# Patient Record
Sex: MALE | Race: WHITE | NOT HISPANIC OR LATINO | Employment: OTHER | URBAN - METROPOLITAN AREA
[De-identification: names, ages, dates, MRNs, and addresses within clinical notes are randomized per-mention and may not be internally consistent; named-entity substitution may affect disease eponyms.]

---

## 2018-02-04 ENCOUNTER — APPOINTMENT (EMERGENCY)
Dept: RADIOLOGY | Facility: HOSPITAL | Age: 68
DRG: 481 | End: 2018-02-04
Payer: MEDICARE

## 2018-02-04 ENCOUNTER — HOSPITAL ENCOUNTER (INPATIENT)
Facility: HOSPITAL | Age: 68
LOS: 3 days | Discharge: NURSING, REHAB OR LONG-TERM CARE | DRG: 481 | End: 2018-02-07
Attending: EMERGENCY MEDICINE | Admitting: ORTHOPAEDIC SURGERY
Payer: MEDICARE

## 2018-02-04 DIAGNOSIS — S72.002A CLOSED FRACTURE OF LEFT HIP, INITIAL ENCOUNTER (HCC): Primary | ICD-10-CM

## 2018-02-04 DIAGNOSIS — S72.142A INTERTROCHANTERIC FRACTURE OF LEFT FEMUR, CLOSED, INITIAL ENCOUNTER (HCC): ICD-10-CM

## 2018-02-04 PROBLEM — I10 ESSENTIAL HYPERTENSION: Chronic | Status: ACTIVE | Noted: 2018-02-04

## 2018-02-04 LAB
ANION GAP SERPL CALCULATED.3IONS-SCNC: 9 MMOL/L (ref 4–13)
APTT PPP: 24 SECONDS (ref 23–35)
BASOPHILS # BLD AUTO: 0.06 THOUSANDS/ΜL (ref 0–0.1)
BASOPHILS NFR BLD AUTO: 0 % (ref 0–1)
BUN SERPL-MCNC: 25 MG/DL (ref 5–25)
CALCIUM SERPL-MCNC: 9 MG/DL (ref 8.3–10.1)
CHLORIDE SERPL-SCNC: 102 MMOL/L (ref 100–108)
CO2 SERPL-SCNC: 27 MMOL/L (ref 21–32)
CREAT SERPL-MCNC: 1.14 MG/DL (ref 0.6–1.3)
EOSINOPHIL # BLD AUTO: 0.24 THOUSAND/ΜL (ref 0–0.61)
EOSINOPHIL NFR BLD AUTO: 2 % (ref 0–6)
ERYTHROCYTE [DISTWIDTH] IN BLOOD BY AUTOMATED COUNT: 13.9 % (ref 11.6–15.1)
GFR SERPL CREATININE-BSD FRML MDRD: 65 ML/MIN/1.73SQ M
GLUCOSE SERPL-MCNC: 97 MG/DL (ref 65–140)
HCT VFR BLD AUTO: 39.4 % (ref 36.5–49.3)
HGB BLD-MCNC: 12.9 G/DL (ref 12–17)
INR PPP: 0.93 (ref 0.86–1.16)
LYMPHOCYTES # BLD AUTO: 1.15 THOUSANDS/ΜL (ref 0.6–4.47)
LYMPHOCYTES NFR BLD AUTO: 8 % (ref 14–44)
MCH RBC QN AUTO: 28.7 PG (ref 26.8–34.3)
MCHC RBC AUTO-ENTMCNC: 32.7 G/DL (ref 31.4–37.4)
MCV RBC AUTO: 88 FL (ref 82–98)
MONOCYTES # BLD AUTO: 1.1 THOUSAND/ΜL (ref 0.17–1.22)
MONOCYTES NFR BLD AUTO: 7 % (ref 4–12)
NEUTROPHILS # BLD AUTO: 12.73 THOUSANDS/ΜL (ref 1.85–7.62)
NEUTS SEG NFR BLD AUTO: 83 % (ref 43–75)
PLATELET # BLD AUTO: 269 THOUSANDS/UL (ref 149–390)
PMV BLD AUTO: 9.4 FL (ref 8.9–12.7)
POTASSIUM SERPL-SCNC: 4.1 MMOL/L (ref 3.5–5.3)
PROTHROMBIN TIME: 12.7 SECONDS (ref 12.1–14.4)
RBC # BLD AUTO: 4.49 MILLION/UL (ref 3.88–5.62)
SODIUM SERPL-SCNC: 138 MMOL/L (ref 136–145)
WBC # BLD AUTO: 15.28 THOUSAND/UL (ref 4.31–10.16)

## 2018-02-04 PROCEDURE — 99221 1ST HOSP IP/OBS SF/LOW 40: CPT | Performed by: INTERNAL MEDICINE

## 2018-02-04 PROCEDURE — 36415 COLL VENOUS BLD VENIPUNCTURE: CPT | Performed by: PHYSICIAN ASSISTANT

## 2018-02-04 PROCEDURE — 85610 PROTHROMBIN TIME: CPT | Performed by: PHYSICIAN ASSISTANT

## 2018-02-04 PROCEDURE — 85025 COMPLETE CBC W/AUTO DIFF WBC: CPT | Performed by: PHYSICIAN ASSISTANT

## 2018-02-04 PROCEDURE — 96374 THER/PROPH/DIAG INJ IV PUSH: CPT

## 2018-02-04 PROCEDURE — 80048 BASIC METABOLIC PNL TOTAL CA: CPT | Performed by: PHYSICIAN ASSISTANT

## 2018-02-04 PROCEDURE — 71045 X-RAY EXAM CHEST 1 VIEW: CPT

## 2018-02-04 PROCEDURE — 99285 EMERGENCY DEPT VISIT HI MDM: CPT

## 2018-02-04 PROCEDURE — 85730 THROMBOPLASTIN TIME PARTIAL: CPT | Performed by: PHYSICIAN ASSISTANT

## 2018-02-04 PROCEDURE — 73502 X-RAY EXAM HIP UNI 2-3 VIEWS: CPT

## 2018-02-04 PROCEDURE — 93005 ELECTROCARDIOGRAM TRACING: CPT

## 2018-02-04 RX ORDER — ONDANSETRON 2 MG/ML
4 INJECTION INTRAMUSCULAR; INTRAVENOUS EVERY 6 HOURS PRN
Status: DISCONTINUED | OUTPATIENT
Start: 2018-02-04 | End: 2018-02-05 | Stop reason: HOSPADM

## 2018-02-04 RX ORDER — SODIUM CHLORIDE, SODIUM LACTATE, POTASSIUM CHLORIDE, CALCIUM CHLORIDE 600; 310; 30; 20 MG/100ML; MG/100ML; MG/100ML; MG/100ML
75 INJECTION, SOLUTION INTRAVENOUS CONTINUOUS
Status: DISCONTINUED | OUTPATIENT
Start: 2018-02-05 | End: 2018-02-05 | Stop reason: HOSPADM

## 2018-02-04 RX ORDER — OXYCODONE HYDROCHLORIDE AND ACETAMINOPHEN 5; 325 MG/1; MG/1
2 TABLET ORAL EVERY 4 HOURS PRN
Status: DISCONTINUED | OUTPATIENT
Start: 2018-02-04 | End: 2018-02-05 | Stop reason: HOSPADM

## 2018-02-04 RX ORDER — MORPHINE SULFATE 2 MG/ML
2 INJECTION, SOLUTION INTRAMUSCULAR; INTRAVENOUS EVERY 2 HOUR PRN
Status: DISCONTINUED | OUTPATIENT
Start: 2018-02-04 | End: 2018-02-05 | Stop reason: HOSPADM

## 2018-02-04 RX ORDER — MORPHINE SULFATE 4 MG/ML
3 INJECTION, SOLUTION INTRAMUSCULAR; INTRAVENOUS EVERY 4 HOURS PRN
Status: DISCONTINUED | OUTPATIENT
Start: 2018-02-04 | End: 2018-02-04

## 2018-02-04 RX ORDER — AMLODIPINE BESYLATE 5 MG/1
5 TABLET ORAL DAILY
Status: DISCONTINUED | OUTPATIENT
Start: 2018-02-05 | End: 2018-02-05 | Stop reason: HOSPADM

## 2018-02-04 RX ORDER — DOCUSATE SODIUM 100 MG/1
100 CAPSULE, LIQUID FILLED ORAL 2 TIMES DAILY
Status: DISCONTINUED | OUTPATIENT
Start: 2018-02-04 | End: 2018-02-05 | Stop reason: HOSPADM

## 2018-02-04 RX ORDER — AMLODIPINE BESYLATE 2.5 MG/1
2.5 TABLET ORAL DAILY
COMMUNITY
End: 2021-07-02

## 2018-02-04 RX ORDER — LISINOPRIL 10 MG/1
10 TABLET ORAL DAILY
Status: DISCONTINUED | OUTPATIENT
Start: 2018-02-05 | End: 2018-02-05 | Stop reason: HOSPADM

## 2018-02-04 RX ORDER — OXYCODONE HYDROCHLORIDE AND ACETAMINOPHEN 5; 325 MG/1; MG/1
1 TABLET ORAL EVERY 4 HOURS PRN
Status: DISCONTINUED | OUTPATIENT
Start: 2018-02-04 | End: 2018-02-05 | Stop reason: HOSPADM

## 2018-02-04 RX ADMIN — OXYCODONE HYDROCHLORIDE AND ACETAMINOPHEN 2 TABLET: 5; 325 TABLET ORAL at 17:15

## 2018-02-04 RX ADMIN — HYDROMORPHONE HYDROCHLORIDE 1 MG: 1 INJECTION, SOLUTION INTRAMUSCULAR; INTRAVENOUS; SUBCUTANEOUS at 15:24

## 2018-02-04 RX ADMIN — OXYCODONE HYDROCHLORIDE AND ACETAMINOPHEN 1 TABLET: 5; 325 TABLET ORAL at 22:35

## 2018-02-04 RX ADMIN — DOCUSATE SODIUM 100 MG: 100 CAPSULE, LIQUID FILLED ORAL at 17:15

## 2018-02-04 RX ADMIN — HYDROMORPHONE HYDROCHLORIDE 1 MG: 1 INJECTION, SOLUTION INTRAMUSCULAR; INTRAVENOUS; SUBCUTANEOUS at 13:58

## 2018-02-04 NOTE — ASSESSMENT & PLAN NOTE
· POA, pain controlled at this time   Neurovascularly intact  · Management as per primary team   · For surgery tomorrow   · NPO at midnight   · Hold ASA  · Estimated risk for probability for postoperative respiratory failure = 4 03%  · CXR clear  · Check EKG

## 2018-02-04 NOTE — ED NOTES
Patient transported to [address] [place] [address], [address] [place] [address]  02/04/18 4474

## 2018-02-04 NOTE — ED PROVIDER NOTES
History  Chief Complaint   Patient presents with    Fall     fell on the ice  pt has left hip pain  was unable to get up       27-year-old male with history of hypertension, prostate cancer who presents today for evaluation of left hip pain after fall occurred just prior to arrival   Patient states he slipped on ice, fell, and landed on his left hip  He describes pain localized to the left hip that is sharp and throbbing  He has little to no pain at rest but is worse with range of motion  He is unable to ambulate  He rates his pain a 3/10 at rest and 7/10 with movement  He did not take any pain medications prior to arrival   He denies any head injury loss of consciousness  He denies any neck pain, back pain, arm pain, left knee pain or foot pain  He denies any paresthesias, numbness or tingling  He denies any chest pain or shortness of breath prior to fall or currently  Prior to Admission Medications   Prescriptions Last Dose Informant Patient Reported? Taking? amLODIPine (NORVASC) 2 5 mg tablet   Yes Yes   Sig: Take 5 mg by mouth daily   aspirin 81 MG tablet   Yes Yes   Sig: Take 81 mg by mouth daily   lisinopril (ZESTRIL) 10 mg tablet   Yes Yes   Sig: Take 10 mg by mouth daily      Facility-Administered Medications: None       Past Medical History:   Diagnosis Date    Cancer [facility] [address] )     PROSTATE    Femur fracture, right ([facility] [address] )     Gastritis 10/11/2016    Hypertension     Tobacco abuse 10/11/2016       Past Surgical History:   Procedure Laterality Date    CHOLECYSTECTOMY      FRACTURE SURGERY      PROSTATECTOMY         History reviewed  No pertinent family history  I have reviewed and agree with the history as documented      Social History   Substance Use Topics    Smoking status: Current Every Day Smoker     Packs/day: 2 00     Years: 50 00     Types: Cigarettes    Smokeless tobacco: Never Used    Alcohol use No        Review of Systems   Respiratory: Negative for chest tightness and shortness of breath  Cardiovascular: Negative for chest pain and palpitations  Musculoskeletal: Positive for arthralgias and gait problem  Negative for joint swelling  Skin: Negative for color change and wound  Neurological: Negative for weakness and numbness  Physical Exam  ED Triage Vitals [02/04/18 1332]   Temperature Pulse Respirations Blood Pressure SpO2   97 9 °F (36 6 °C) 78 20 158/82 92 %      Temp Source Heart Rate Source Patient Position - Orthostatic VS BP Location FiO2 (%)   Oral Monitor Lying Right arm --      Pain Score       7           Orthostatic Vital Signs  Vitals:    02/04/18 1332 02/04/18 1400 02/04/18 1500 02/04/18 1523   BP: 158/82 159/87 157/77 157/77   Pulse: 78 80 74 73   Patient Position - Orthostatic VS: Lying  Sitting Lying       Physical Exam   Constitutional: He is oriented to person, place, and time  He appears well-developed and well-nourished  Non-toxic appearance  He does not have a sickly appearance  He does not appear ill  No distress  HENT:   Head: Normocephalic and atraumatic  Right Ear: External ear normal    Left Ear: External ear normal    Nose: Nose normal    Mouth/Throat: Oropharynx is clear and moist    Eyes: Conjunctivae and EOM are normal  Pupils are equal, round, and reactive to light  Neck: Normal range of motion  Neck supple  Cardiovascular: Normal rate, regular rhythm and normal heart sounds  Exam reveals no gallop and no friction rub  No murmur heard  Pulmonary/Chest: Effort normal and breath sounds normal  No respiratory distress  He has no decreased breath sounds  He has no wheezes  He has no rhonchi  He has no rales  Musculoskeletal:        Left hip: He exhibits decreased range of motion, decreased strength, tenderness, bony tenderness and deformity (shortening left leg)  He exhibits no swelling, no crepitus and no laceration  Left knee: No tenderness found  Left ankle: He exhibits normal pulse  No tenderness  Legs:       Left foot: Normal  There is no tenderness and no bony tenderness  DP pulse 2+  N/v intact  No wounds  Neurological: He is alert and oriented to person, place, and time  Skin: Skin is warm and dry  Capillary refill takes less than 2 seconds  No rash noted  He is not diaphoretic  Psychiatric: He has a normal mood and affect  Nursing note and vitals reviewed  ED Medications  Medications   HYDROmorphone (DILAUDID) injection 1 mg (1 mg Intravenous Given 2/4/18 1358)   HYDROmorphone (DILAUDID) injection 1 mg (1 mg Intravenous Given 2/4/18 1524)       Diagnostic Studies  Results Reviewed     Procedure Component Value Units Date/Time    Protime-INR [[id number]]  (Normal) Collected:  02/04/18 1448    Lab Status:  Final result Specimen:  Blood from Arm, Left Updated:  02/04/18 1512     Protime 12 7 seconds      INR 0 93    APTT [[id number]]  (Normal) Collected:  02/04/18 1448    Lab Status:  Final result Specimen:  Blood from Arm, Left Updated:  02/04/18 1512     PTT 24 seconds     Narrative: Therapeutic Heparin Range = 60-90 seconds    Basic metabolic panel [[id number]]  (Normal) Collected:  02/04/18 1448    Lab Status:  Final result Specimen:  Blood from Arm, Left Updated:  02/04/18 1509     Sodium 138 mmol/L      Potassium 4 1 mmol/L      Chloride 102 mmol/L      CO2 27 mmol/L      Anion Gap 9 mmol/L      BUN 25 mg/dL      Creatinine 1 14 mg/dL      Glucose 97 mg/dL      Calcium 9 0 mg/dL      eGFR 65 ml/min/1 73sq m     Narrative:         National Kidney Disease Education Program recommendations are as follows:  GFR calculation is accurate only with a steady state creatinine  Chronic Kidney disease less than 60 ml/min/1 73 sq  meters  Kidney failure less than 15 ml/min/1 73 sq  meters      CBC and differential [[id number]]  (Abnormal) Collected:  02/04/18 1448    Lab Status:  Final result Specimen:  Blood from Arm, Left Updated:  02/04/18 1458     WBC 15 28 (H) Thousand/uL      RBC 4 49 Million/uL      Hemoglobin 12 9 g/dL      Hematocrit 39 4 %      MCV 88 fL      MCH 28 7 pg      MCHC 32 7 g/dL      RDW 13 9 %      MPV 9 4 fL      Platelets 104 Thousands/uL      Neutrophils Relative 83 (H) %      Lymphocytes Relative 8 (L) %      Monocytes Relative 7 %      Eosinophils Relative 2 %      Basophils Relative 0 %      Neutrophils Absolute 12 73 (H) Thousands/µL      Lymphocytes Absolute 1 15 Thousands/µL      Monocytes Absolute 1 10 Thousand/µL      Eosinophils Absolute 0 24 Thousand/µL      Basophils Absolute 0 06 Thousands/µL                  XR hip/pelv 2-3 vws left   ED Interpretation by Adrián Keen PA-C (02/04 1439)   Intertrochanteric fracture left hip      XR chest 1 view    (Results Pending)              Procedures  Procedures       Phone Contacts  ED Phone Contact    ED Course  ED Course as of Feb 04 1615   Jaswant Carreno Feb 04, 2018   1437 Spoke with Dr Maury Nation, will admit to ortho    1449 Patient and family have been informed of POC  His pain is controlled currently                                MDM  Number of Diagnoses or Management Options  Closed fracture of left hip, initial encounter [facility]): new and requires workup  Diagnosis management comments:   71 yo M with left hip pain after fall  Xray with intertrochanteric fracture of left hip  He was given dilaudid for pain  Spoke with Dr Maury Nation, ortho, regarding case and patient was accepted for admission under ortho service  Patient and family member aware, verbalize understanding and are agreeable to plan         Amount and/or Complexity of Data Reviewed  Clinical lab tests: ordered  Tests in the radiology section of CPT®: ordered and reviewed  Discuss the patient with other providers: yes (Dr Maury Nation, Ortho)  Independent visualization of images, tracings, or specimens: yes    Patient Progress  Patient progress: stable    CritCare Time    Disposition  Final diagnoses:   Closed fracture of left hip, initial encounter [facility])     Time reflects when diagnosis was documented in both MDM as applicable and the Disposition within this note     Time User Action Codes Description Comment    2/4/2018  2:41 PM Elvie Wills Add [[id number] 002A] Closed fracture of left hip, initial encounter [facility])       ED Disposition     ED Disposition Condition Comment    Admit  Case was discussed with Dr Judie Blackwell and the patient's admission status was agreed to be Admission Status: inpatient status to the service of Dr Judie Blackwell   Follow-up Information    None       Current Discharge Medication List      CONTINUE these medications which have NOT CHANGED    Details   amLODIPine (NORVASC) 2 5 mg tablet Take 5 mg by mouth daily      aspirin 81 MG tablet Take 81 mg by mouth daily      lisinopril (ZESTRIL) 10 mg tablet Take 10 mg by mouth daily           No discharge procedures on file      ED Provider  Electronically Signed by           Robbie Ruiz PA-C  02/04/18 6559

## 2018-02-04 NOTE — CONSULTS
[address] Internal Medicine  Consult- Nav Thorpe 1950, 70 y o  male MRN: [id number]    Unit/Bed#: -01 Encounter: [id number]    Primary Care Provider: Osmel Mccarthy MD   Date and time admitted to hospital: 2/4/2018  1:20 PM      Inpatient consult to Internal Medicine  Consult performed by: Lucio Yuan ordered by: Jesus Manuel Pearce          * Intertrochanteric fracture of left femur, closed, initial encounter [facility])   Assessment & Plan    · POA, pain controlled at this time  Neurovascularly intact  · Management as per primary team   · For surgery tomorrow   · NPO at midnight   · Hold ASA  · Estimated risk for probability for postoperative respiratory failure = 4 03%  · CXR clear  · Check EKG        Essential hypertension   Assessment & Plan    · BP controlled at this time  · Continue home medical management - Norvasc, Lisinopril        Tobacco abuse   Assessment & Plan    · Smokes roughly 1 5-2 packs a day since he was 16  · Advised cessation  · NRT              VTE Prophylaxis: Reason for no pharmacologic prophylaxis As per primary service, for surgery tomorrow, will need prophylaxis status post   / sequential compression device     Recommendations for Discharge:  · Stable from medical standpoint once status post OR for hip fixation     Counseling / Coordination of Care Time: 45 minutes  Greater than 50% of total time spent on patient counseling and coordination of care  Collaboration of Care: Were Recommendations Directly Discussed with Primary Treatment Team? - No     History of Present Illness: Clau Britton is a 70 y o  male who is originally admitted to the orthopedic surgery service on 2/4/2018 due to intertrochanteric left hip fracture  We are consulted for pre-op clearance and HTN  Patient reports that he slipped on the ice today and fell  Reports some pain at the left hip  Denies numbness, tingling, or weakness  Denies LOC   Reports that he takes a baby ASA roughly 5-6 nights a week  Reports that he takes his blood pressure medications intermittently  Reports that he smokes 1 5-2 packs of cigarettes per day since the age of 15 and it is because of this that he is short of breath every day as per the patient  Denies ever having problems with anesthesia  Denies chest pain, fevers, or chills  Reports history of prostate cancer but has not received radiation for 7 years and is in remission with no further needs for urological follow up  Review of Systems:    Review of Systems   Constitutional: Negative for chills and fever  Respiratory: Positive for shortness of breath  Negative for cough, chest tightness and wheezing  Cardiovascular: Positive for leg swelling  Negative for chest pain and palpitations  Gastrointestinal: Negative for abdominal pain  Musculoskeletal: Positive for arthralgias and gait problem  Skin: Negative for color change  Neurological: Negative for dizziness, syncope and headaches  All other systems reviewed and are negative  Past Medical and Surgical History:     Past Medical History:   Diagnosis Date    Cancer [facility])     PROSTATE    Femur fracture, right ([address] )     Gastritis 10/11/2016    Hypertension     Tobacco abuse 10/11/2016       Past Surgical History:   Procedure Laterality Date    CHOLECYSTECTOMY      FRACTURE SURGERY      PROSTATECTOMY         Meds/Allergies:    all medications and allergies reviewed    Allergies: No Known Allergies    Social History:     Marital Status: /Civil Union    Substance Use History:   History   Alcohol Use No     History   Smoking Status    Current Every Day Smoker    Packs/day: 2 00    Years: 50 00    Types: Cigarettes   Smokeless Tobacco    Never Used     History   Drug Use No       Family History:    History reviewed  No pertinent family history      Physical Exam:     Vitals:   Blood Pressure: 141/73 (02/04/18 1630)  Pulse: 76 (02/04/18 1630)  Temperature: 97 8 °F (36 6 °C) (02/04/18 1630)  Temp Source: Oral (02/04/18 1630)  Respirations: 20 (02/04/18 1630)  Height: 5' 6" (167 6 cm) (02/04/18 1630)  Weight - Scale: 76 8 kg (169 lb 5 oz) (02/04/18 1630)  SpO2: 95 % (02/04/18 1630)    Physical Exam   Constitutional: He is oriented to person, place, and time  Vital signs are normal  He appears well-developed and well-nourished  Non-toxic appearance  No distress  HENT:   Head: Normocephalic and atraumatic  Eyes: Conjunctivae and EOM are normal  Pupils are equal, round, and reactive to light  Neck: Neck supple  Cardiovascular: Normal rate, regular rhythm, S1 normal, S2 normal, normal heart sounds and intact distal pulses  Exam reveals no S3 and no S4  No murmur heard  Pulmonary/Chest: Effort normal and breath sounds normal  No accessory muscle usage  No respiratory distress  He has no decreased breath sounds  He has no wheezes  He has no rhonchi  He has no rales  He exhibits no tenderness  Abdominal: Soft  Bowel sounds are normal  He exhibits no distension and no mass  There is no tenderness  There is no rigidity, no rebound and no guarding  Musculoskeletal:        Left hip: He exhibits tenderness and deformity  Neurological: He is alert and oriented to person, place, and time  No sensory deficit  Skin: Skin is warm and dry  Additional Data:     Lab Results: I have personally reviewed pertinent reports          Results from last 7 days  Lab Units 02/04/18  1448   WBC Thousand/uL 15 28*   HEMOGLOBIN g/dL 12 9   HEMATOCRIT % 39 4   PLATELETS Thousands/uL 269   NEUTROS PCT % 83*   LYMPHS PCT % 8*   MONOS PCT % 7   EOS PCT % 2       Results from last 7 days  Lab Units 02/04/18  1448   SODIUM mmol/L 138   POTASSIUM mmol/L 4 1   CHLORIDE mmol/L 102   CO2 mmol/L 27   BUN mg/dL 25   CREATININE mg/dL 1 14   CALCIUM mg/dL 9 0   GLUCOSE RANDOM mg/dL 97       Results from last 7 days  Lab Units 02/04/18  1448   INR  0 93         No results found for: HGBA1C    Imaging: I have personally reviewed pertinent reports  XR hip/pelv 2-3 vws left   ED Interpretation by Omar Méndez PA-C (02/04 0049)   Intertrochanteric fracture left hip      XR chest 1 view    (Results Pending)       EKG, Pathology, and Other Studies Reviewed on Admission:   · EKG: Pending    ** Please Note: This note has been constructed using a voice recognition system   **

## 2018-02-05 ENCOUNTER — ANESTHESIA (INPATIENT)
Dept: PERIOP | Facility: HOSPITAL | Age: 68
DRG: 481 | End: 2018-02-05
Payer: MEDICARE

## 2018-02-05 ENCOUNTER — ANESTHESIA EVENT (INPATIENT)
Dept: PERIOP | Facility: HOSPITAL | Age: 68
DRG: 481 | End: 2018-02-05
Payer: MEDICARE

## 2018-02-05 ENCOUNTER — APPOINTMENT (INPATIENT)
Dept: RADIOLOGY | Facility: HOSPITAL | Age: 68
DRG: 481 | End: 2018-02-05
Payer: MEDICARE

## 2018-02-05 LAB
ABO GROUP BLD: NORMAL
ANION GAP SERPL CALCULATED.3IONS-SCNC: 10 MMOL/L (ref 4–13)
ATRIAL RATE: 76 BPM
ATRIAL RATE: 77 BPM
BLD GP AB SCN SERPL QL: NEGATIVE
BUN SERPL-MCNC: 25 MG/DL (ref 5–25)
CALCIUM SERPL-MCNC: 8.4 MG/DL (ref 8.3–10.1)
CHLORIDE SERPL-SCNC: 103 MMOL/L (ref 100–108)
CO2 SERPL-SCNC: 26 MMOL/L (ref 21–32)
CREAT SERPL-MCNC: 1.2 MG/DL (ref 0.6–1.3)
ERYTHROCYTE [DISTWIDTH] IN BLOOD BY AUTOMATED COUNT: 13.8 % (ref 11.6–15.1)
GFR SERPL CREATININE-BSD FRML MDRD: 61 ML/MIN/1.73SQ M
GLUCOSE SERPL-MCNC: 103 MG/DL (ref 65–140)
HCT VFR BLD AUTO: 34 % (ref 36.5–49.3)
HGB BLD-MCNC: 11 G/DL (ref 12–17)
MCH RBC QN AUTO: 28.5 PG (ref 26.8–34.3)
MCHC RBC AUTO-ENTMCNC: 32.4 G/DL (ref 31.4–37.4)
MCV RBC AUTO: 88 FL (ref 82–98)
P AXIS: 67 DEGREES
P AXIS: 70 DEGREES
PLATELET # BLD AUTO: 232 THOUSANDS/UL (ref 149–390)
PMV BLD AUTO: 8.3 FL (ref 8.9–12.7)
POTASSIUM SERPL-SCNC: 4.2 MMOL/L (ref 3.5–5.3)
PR INTERVAL: 192 MS
PR INTERVAL: 194 MS
QRS AXIS: 41 DEGREES
QRS AXIS: 71 DEGREES
QRSD INTERVAL: 94 MS
QRSD INTERVAL: 94 MS
QT INTERVAL: 368 MS
QT INTERVAL: 386 MS
QTC INTERVAL: 414 MS
QTC INTERVAL: 436 MS
RBC # BLD AUTO: 3.86 MILLION/UL (ref 3.88–5.62)
RH BLD: POSITIVE
SODIUM SERPL-SCNC: 139 MMOL/L (ref 136–145)
SPECIMEN EXPIRATION DATE: NORMAL
T WAVE AXIS: 71 DEGREES
T WAVE AXIS: 85 DEGREES
VENTRICULAR RATE: 76 BPM
VENTRICULAR RATE: 77 BPM
WBC # BLD AUTO: 10.11 THOUSAND/UL (ref 4.31–10.16)

## 2018-02-05 PROCEDURE — 85027 COMPLETE CBC AUTOMATED: CPT | Performed by: ORTHOPAEDIC SURGERY

## 2018-02-05 PROCEDURE — C1713 ANCHOR/SCREW BN/BN,TIS/BN: HCPCS | Performed by: ORTHOPAEDIC SURGERY

## 2018-02-05 PROCEDURE — 27245 TREAT THIGH FRACTURE: CPT | Performed by: ORTHOPAEDIC SURGERY

## 2018-02-05 PROCEDURE — 0QS704Z REPOSITION LEFT UPPER FEMUR WITH INTERNAL FIXATION DEVICE, OPEN APPROACH: ICD-10-PCS | Performed by: ORTHOPAEDIC SURGERY

## 2018-02-05 PROCEDURE — 86900 BLOOD TYPING SEROLOGIC ABO: CPT | Performed by: PHYSICIAN ASSISTANT

## 2018-02-05 PROCEDURE — 99222 1ST HOSP IP/OBS MODERATE 55: CPT | Performed by: ORTHOPAEDIC SURGERY

## 2018-02-05 PROCEDURE — 86850 RBC ANTIBODY SCREEN: CPT | Performed by: PHYSICIAN ASSISTANT

## 2018-02-05 PROCEDURE — 86901 BLOOD TYPING SEROLOGIC RH(D): CPT | Performed by: PHYSICIAN ASSISTANT

## 2018-02-05 PROCEDURE — 80048 BASIC METABOLIC PNL TOTAL CA: CPT | Performed by: ORTHOPAEDIC SURGERY

## 2018-02-05 PROCEDURE — C1769 GUIDE WIRE: HCPCS | Performed by: ORTHOPAEDIC SURGERY

## 2018-02-05 PROCEDURE — 73552 X-RAY EXAM OF FEMUR 2/>: CPT

## 2018-02-05 PROCEDURE — 93010 ELECTROCARDIOGRAM REPORT: CPT | Performed by: INTERNAL MEDICINE

## 2018-02-05 DEVICE — 11MM/130 DEG TI CANN TROCH FIXATION NAIL 170MM-STERILE: Type: IMPLANTABLE DEVICE | Status: FUNCTIONAL

## 2018-02-05 RX ORDER — ACETAMINOPHEN 325 MG/1
650 TABLET ORAL EVERY 4 HOURS PRN
Status: DISCONTINUED | OUTPATIENT
Start: 2018-02-05 | End: 2018-02-07 | Stop reason: HOSPADM

## 2018-02-05 RX ORDER — SODIUM CHLORIDE, SODIUM LACTATE, POTASSIUM CHLORIDE, CALCIUM CHLORIDE 600; 310; 30; 20 MG/100ML; MG/100ML; MG/100ML; MG/100ML
INJECTION, SOLUTION INTRAVENOUS CONTINUOUS PRN
Status: DISCONTINUED | OUTPATIENT
Start: 2018-02-05 | End: 2018-02-05 | Stop reason: SURG

## 2018-02-05 RX ORDER — ASPIRIN 81 MG/1
81 TABLET, CHEWABLE ORAL DAILY
Status: DISCONTINUED | OUTPATIENT
Start: 2018-02-06 | End: 2018-02-07 | Stop reason: HOSPADM

## 2018-02-05 RX ORDER — MORPHINE SULFATE 2 MG/ML
2 INJECTION, SOLUTION INTRAMUSCULAR; INTRAVENOUS EVERY 2 HOUR PRN
Status: DISCONTINUED | OUTPATIENT
Start: 2018-02-05 | End: 2018-02-07 | Stop reason: HOSPADM

## 2018-02-05 RX ORDER — MIDAZOLAM HYDROCHLORIDE 1 MG/ML
INJECTION INTRAMUSCULAR; INTRAVENOUS AS NEEDED
Status: DISCONTINUED | OUTPATIENT
Start: 2018-02-05 | End: 2018-02-05 | Stop reason: SURG

## 2018-02-05 RX ORDER — FENTANYL CITRATE/PF 50 MCG/ML
25 SYRINGE (ML) INJECTION
Status: DISCONTINUED | OUTPATIENT
Start: 2018-02-05 | End: 2018-02-05 | Stop reason: HOSPADM

## 2018-02-05 RX ORDER — ROPIVACAINE HYDROCHLORIDE 2 MG/ML
INJECTION, SOLUTION EPIDURAL; INFILTRATION; PERINEURAL AS NEEDED
Status: DISCONTINUED | OUTPATIENT
Start: 2018-02-05 | End: 2018-02-05 | Stop reason: SURG

## 2018-02-05 RX ORDER — ONDANSETRON 2 MG/ML
INJECTION INTRAMUSCULAR; INTRAVENOUS AS NEEDED
Status: DISCONTINUED | OUTPATIENT
Start: 2018-02-05 | End: 2018-02-05 | Stop reason: SURG

## 2018-02-05 RX ORDER — ONDANSETRON 2 MG/ML
4 INJECTION INTRAMUSCULAR; INTRAVENOUS EVERY 6 HOURS PRN
Status: DISCONTINUED | OUTPATIENT
Start: 2018-02-05 | End: 2018-02-07 | Stop reason: HOSPADM

## 2018-02-05 RX ORDER — FENTANYL CITRATE 50 UG/ML
INJECTION, SOLUTION INTRAMUSCULAR; INTRAVENOUS AS NEEDED
Status: DISCONTINUED | OUTPATIENT
Start: 2018-02-05 | End: 2018-02-05 | Stop reason: SURG

## 2018-02-05 RX ORDER — AMLODIPINE BESYLATE 5 MG/1
5 TABLET ORAL DAILY
Status: DISCONTINUED | OUTPATIENT
Start: 2018-02-06 | End: 2018-02-07 | Stop reason: HOSPADM

## 2018-02-05 RX ORDER — MAGNESIUM HYDROXIDE 1200 MG/15ML
LIQUID ORAL AS NEEDED
Status: DISCONTINUED | OUTPATIENT
Start: 2018-02-05 | End: 2018-02-05 | Stop reason: HOSPADM

## 2018-02-05 RX ORDER — ONDANSETRON 2 MG/ML
4 INJECTION INTRAMUSCULAR; INTRAVENOUS ONCE AS NEEDED
Status: DISCONTINUED | OUTPATIENT
Start: 2018-02-05 | End: 2018-02-05 | Stop reason: HOSPADM

## 2018-02-05 RX ORDER — SODIUM CHLORIDE, SODIUM LACTATE, POTASSIUM CHLORIDE, CALCIUM CHLORIDE 600; 310; 30; 20 MG/100ML; MG/100ML; MG/100ML; MG/100ML
100 INJECTION, SOLUTION INTRAVENOUS CONTINUOUS
Status: DISCONTINUED | OUTPATIENT
Start: 2018-02-05 | End: 2018-02-07 | Stop reason: HOSPADM

## 2018-02-05 RX ORDER — PROPOFOL 10 MG/ML
INJECTION, EMULSION INTRAVENOUS AS NEEDED
Status: DISCONTINUED | OUTPATIENT
Start: 2018-02-05 | End: 2018-02-05 | Stop reason: SURG

## 2018-02-05 RX ORDER — DOCUSATE SODIUM 100 MG/1
100 CAPSULE, LIQUID FILLED ORAL 2 TIMES DAILY
Status: DISCONTINUED | OUTPATIENT
Start: 2018-02-05 | End: 2018-02-07 | Stop reason: HOSPADM

## 2018-02-05 RX ORDER — OXYCODONE HYDROCHLORIDE AND ACETAMINOPHEN 5; 325 MG/1; MG/1
2 TABLET ORAL EVERY 4 HOURS PRN
Status: DISCONTINUED | OUTPATIENT
Start: 2018-02-05 | End: 2018-02-07 | Stop reason: HOSPADM

## 2018-02-05 RX ORDER — OXYCODONE HYDROCHLORIDE AND ACETAMINOPHEN 5; 325 MG/1; MG/1
1 TABLET ORAL EVERY 4 HOURS PRN
Status: DISCONTINUED | OUTPATIENT
Start: 2018-02-05 | End: 2018-02-07 | Stop reason: HOSPADM

## 2018-02-05 RX ORDER — LISINOPRIL 10 MG/1
10 TABLET ORAL DAILY
Status: DISCONTINUED | OUTPATIENT
Start: 2018-02-06 | End: 2018-02-07 | Stop reason: HOSPADM

## 2018-02-05 RX ADMIN — SODIUM CHLORIDE, POTASSIUM CHLORIDE, SODIUM LACTATE AND CALCIUM CHLORIDE 75 ML/HR: 600; 310; 30; 20 INJECTION, SOLUTION INTRAVENOUS at 13:00

## 2018-02-05 RX ADMIN — LISINOPRIL 10 MG: 10 TABLET ORAL at 09:10

## 2018-02-05 RX ADMIN — ONDANSETRON 4 MG: 2 INJECTION INTRAMUSCULAR; INTRAVENOUS at 19:05

## 2018-02-05 RX ADMIN — MIDAZOLAM HYDROCHLORIDE 2 MG: 1 INJECTION, SOLUTION INTRAMUSCULAR; INTRAVENOUS at 18:21

## 2018-02-05 RX ADMIN — SODIUM CHLORIDE, SODIUM LACTATE, POTASSIUM CHLORIDE, AND CALCIUM CHLORIDE: .6; .31; .03; .02 INJECTION, SOLUTION INTRAVENOUS at 18:56

## 2018-02-05 RX ADMIN — SODIUM CHLORIDE, POTASSIUM CHLORIDE, SODIUM LACTATE AND CALCIUM CHLORIDE 75 ML/HR: 600; 310; 30; 20 INJECTION, SOLUTION INTRAVENOUS at 00:10

## 2018-02-05 RX ADMIN — DOCUSATE SODIUM 100 MG: 100 CAPSULE, LIQUID FILLED ORAL at 09:10

## 2018-02-05 RX ADMIN — ROPIVACAINE HYDROCHLORIDE 40 ML: 2 INJECTION, SOLUTION EPIDURAL; INFILTRATION at 18:21

## 2018-02-05 RX ADMIN — FENTANYL CITRATE 50 MCG: 50 INJECTION INTRAMUSCULAR; INTRAVENOUS at 18:59

## 2018-02-05 RX ADMIN — DOCUSATE SODIUM 100 MG: 100 CAPSULE, LIQUID FILLED ORAL at 21:57

## 2018-02-05 RX ADMIN — AMLODIPINE BESYLATE 5 MG: 5 TABLET ORAL at 09:10

## 2018-02-05 RX ADMIN — SODIUM CHLORIDE, SODIUM LACTATE, POTASSIUM CHLORIDE, AND CALCIUM CHLORIDE 100 ML/HR: .6; .31; .03; .02 INJECTION, SOLUTION INTRAVENOUS at 21:58

## 2018-02-05 RX ADMIN — FENTANYL CITRATE 50 MCG: 50 INJECTION INTRAMUSCULAR; INTRAVENOUS at 19:15

## 2018-02-05 RX ADMIN — CEFAZOLIN SODIUM 1000 MG: 1 SOLUTION INTRAVENOUS at 19:05

## 2018-02-05 RX ADMIN — OXYCODONE HYDROCHLORIDE AND ACETAMINOPHEN 1 TABLET: 5; 325 TABLET ORAL at 21:57

## 2018-02-05 RX ADMIN — PROPOFOL 200 MG: 10 INJECTION, EMULSION INTRAVENOUS at 18:59

## 2018-02-05 NOTE — ANESTHESIA PROCEDURE NOTES
Peripheral Block    Patient location during procedure: holding area  Start time: 2/5/2018 6:18 PM  Reason for block: at surgeon's request and post-op pain management  Staffing  Anesthesiologist: Vanessa VAUGHN  Performed: anesthesiologist   Preanesthetic Checklist  Completed: patient identified, site marked, surgical consent, pre-op evaluation, timeout performed, IV checked, risks and benefits discussed and monitors and equipment checked  Peripheral Block  Patient position: supine  Prep: ChloraPrep  Patient monitoring: continuous pulse ox and frequent blood pressure checks  Anesthesia block type: Fascia iliaca block    Laterality: left  Injection technique: single-shot  Procedures: ultrasound guided  ultrasound permanent image saved    Local infiltration: ropivacaine  Infiltration strength: 0 38 %  Dose: 40 mL  Needle  Needle type: Stimuplex   Needle localization: ultrasound guidance  Test dose: negative  Assessment  Injection assessment: incremental injection, local visualized surrounding nerve on ultrasound, negative aspiration for CSF, no paresthesia on injection and negative aspiration for heme  Paresthesia pain: none  Heart rate change: no  Slow fractionated injection: yes  Post-procedure:  site cleaned  patient tolerated the procedure well with no immediate complications

## 2018-02-05 NOTE — H&P
Orthopedics   Nav Coretta Cheadle 70 y o  male MRN: [id number]  Unit/Bed#: -01      Chief Complaint:   Left hip pain    HPI:   70 y o male who sustained a mechanical fall yesterday afternoon  Patient states he slipped on ice and fell directly onto his left side  He denies hitting his head or loss of consciousness  After the fall he noted severe left hip pain and inability to bear weight on the left lower extremity  He reported to the emergency department where x-rays revealed a left hip fracture  Currently his pain is well controlled  He denies numbness and tingling  No fevers or chills  He does not utilize any assistive device for ambulation  Review Of Systems:   · Skin: See HPI  · Neuro: See HPI  · Musculoskeletal: See HPI  · 14 point review of systems negative except as stated above     Past Medical History:   Past Medical History:   Diagnosis Date    Cancer ([facility] )     PROSTATE    [facility]ur fracture[facility] right ([facility] [address] )     Gastritis 10/11/2016    Hypertension     Tobacco abuse 10/11/2016       Past Surgical History:   Past Surgical History:   Procedure Laterality Date    CHOLECYSTECTOMY      FRACTURE SURGERY      PROSTATECTOMY         Family History:  Family history reviewed and non-contributory  History reviewed  No pertinent family history      Social History:  Social History     Social History    Marital status: /Civil Union     Spouse name: N/A    Number of children: N/A    Years of education: N/A     Social History Main Topics    Smoking status: Current Every Day Smoker     Packs/day: 2 00     Years: 50 00     Types: Cigarettes    Smokeless tobacco: Never Used    Alcohol use No    Drug use: No    Sexual activity: Not Asked     Other Topics Concern    None     Social History Narrative    None       Allergies:   No Known Allergies        Labs:    0  Lab Value Date/Time   HCT 34 0 (L) 02/05/2018 0425   HCT 39 4 02/04/2018 1448   HCT 39 4 10/11/2016 0011   HGB 11 0 (L) 02/05/2018 0425 HGB 12 9 02/04/2018 1448   HGB 12 9 10/11/2016 0011   INR 0 93 02/04/2018 1448   WBC 10 11 02/05/2018 0425   WBC 15 28 (H) 02/04/2018 1448   WBC 9 05 10/11/2016 0011       Meds:    Current Facility-Administered Medications:     amLODIPine (NORVASC) tablet 5 mg, 5 mg, Oral, Daily, Armando Portillo PA-C, 5 mg at 02/05/18 1235    ceFAZolin (ANCEF) IVPB (premix) 1,000 mg, 1,000 mg, Intravenous, Once, Lino Parker MD    docusate sodium (COLACE) capsule 100 mg, 100 mg, Oral, BID, Lino Parker MD, 100 mg at 02/05/18 0910    lactated ringers infusion, 75 mL/hr, Intravenous, Continuous, Lino Parker MD, Last Rate: 75 mL/hr at 02/05/18 0010, 75 mL/hr at 02/05/18 0010    lisinopril (ZESTRIL) tablet 10 mg, 10 mg, Oral, Daily, Armando Portillo PA-C, 10 mg at 02/05/18 0910    morphine injection 2 mg, 2 mg, Intravenous, Q2H PRN, Lnio Parker MD    nicotine (NICODERM CQ) 7 mg/24hr TD 24 hr patch 1 patch, 1 patch, Transdermal, Daily, Lino Parker MD    ondansetron (ZOFRAN) injection 4 mg, 4 mg, Intravenous, Q6H PRN, Lino Parker MD    oxyCODONE-acetaminophen (PERCOCET) 5-325 mg per tablet 1 tablet, 1 tablet, Oral, Q4H PRN, Lino Parker MD, 1 tablet at 02/04/18 2235    oxyCODONE-acetaminophen (PERCOCET) 5-325 mg per tablet 2 tablet, 2 tablet, Oral, Q4H PRN, Lino Parker MD, 2 tablet at 02/04/18 1715    Physical Exam:   /83 (BP Location: Right arm)   Pulse 71   Temp 98 6 °F (37 °C) (Oral)   Resp 18   Ht 5' 6" (1 676 m)   Wt 76 8 kg (169 lb 5 oz)   SpO2 93%   BMI 27 33 kg/m²   Gen: Alert and oriented to person, place, time  HEENT: EOMI, eyes clear, moist mucus membranes, hearing intact  Respiratory: Bilateral chest rise  No audible wheezing found  Cardiovascular: Regular Rate and Rhythm  Abdomen: soft nontender/nondistended  Musculoskeletal:  Left hip:  Skin intact  Left lower extremity shortened and externally rotated  Tenderness to palpation greater trochanter    Positive log roll test   Motor and sensation intact left lower extremity  2+ DP pulse  Radiology:   I personally reviewed the films  X-rays left hip reveals intertrochanteric fracture    _*_*_*_*_*_*_*_*_*_*_*_*_*_*_*_*_*_*_*_*_*_*_*_*_*_*_*_*_*_*_*_*_*_*_*_*_*_*_*_*_*    Assessment:  68 y o male with left hip intertrochanteric fracture after mechanical fall on ice    Plan:   · Pain Control  · NWB LLE/bedrest  · NPO  · Type and screen ordered  · Plan for surgery today consisting of intramedullary nail fixation  Medically cleared for surgery      Rosa Zaman PA-C

## 2018-02-05 NOTE — ANESTHESIA PREPROCEDURE EVALUATION
Review of Systems/Medical History  Patient summary reviewed  Chart reviewed      Cardiovascular  EKG reviewed, Hypertension controlled,    Pulmonary  Smoker cigarette smoker  , Tobacco cessation counseling given , COPD moderate- medication dependent ,        GI/Hepatic  Negative GI/hepatic ROS          Prostatic disorder, history of prostate cancer  Comment: S/p prostatectomy     Endo/Other  Negative endo/other ROS      GYN       Hematology  Negative hematology ROS      Musculoskeletal    Comment: Left hip fracture      Neurology  Negative neurology ROS      Psychology   Negative psychology ROS              Physical Exam    Airway    Mallampati score: II  TM Distance: >3 FB  Neck ROM: full     Dental   No notable dental hx upper dentures and lower dentures,     Cardiovascular  Rhythm: regular, Rate: normal, Cardiovascular exam normal    Pulmonary  Pulmonary exam normal Breath sounds clear to auscultation,     Other Findings        Anesthesia Plan  ASA Score- 2     Anesthesia Type- general and regional with ASA Monitors  Additional Monitors:   Airway Plan: LMA  Comment: Left fascia iliaca block  Plan Factors-    Induction- intravenous  Postoperative Plan- Plan for postoperative opioid use  Informed Consent- Anesthetic plan and risks discussed with patient  I personally reviewed this patient with the CRNA  Discussed and agreed on the Anesthesia Plan with the CRNA  Placido Lynn           Recent labs personally reviewed:  Lab Results   Component Value Date    WBC 10 11 02/05/2018    HGB 11 0 (L) 02/05/2018     02/05/2018     Lab Results   Component Value Date     02/05/2018    K 4 2 02/05/2018    BUN 25 02/05/2018    CREATININE 1 20 02/05/2018    GLUCOSE 103 02/05/2018     Lab Results   Component Value Date    PTT 24 02/04/2018      Lab Results   Component Value Date    INR 0 93 02/04/2018       Blood type A    No results found for: Christopher Pitts MD, have personally seen and evaluated the patient prior to anesthetic care  I have reviewed the pre-anesthetic record, and other medical records if appropriate to the anesthetic care  If a CRNA is involved in the case, I have reviewed the CRNA assessment, if present, and agree  Risks/benefits and alternatives discussed with patient including possible PONV, sore throat, and possibility of rare anesthetic and surgical emergencies

## 2018-02-05 NOTE — CASE MANAGEMENT
Initial Clinical Review    Admission: Date/Time/Statement: 2/4/18 @ 1442 Inpatient Written     Orders Placed This Encounter   Procedures    Inpatient Admission (expected length of stay for this patient is greater than two midnights)     Standing Status:   Standing     Number of Occurrences:   1     Order Specific Question:   Admitting Physician     Answer:   Que Landon     Order Specific Question:   Level of Care     Answer:   Med Surg [16]     Order Specific Question:   Estimated length of stay     Answer:   More than 2 Midnights     Order Specific Question:   Certification     Answer:   I certify that inpatient services are medically necessary for this patient for a duration of greater than two midnights  See H&P and MD Progress Notes for additional information about the patient's course of treatment  Comments:   2 midnights         ED: Date/Time/Mode of Arrival:   ED Arrival Information     Expected Arrival Acuity Means of Arrival Escorted By Service Admission Type    - 2/4/2018 13:19 [facility] Emergency    Arrival Complaint    -          Chief Complaint:   Chief Complaint   Patient presents with    Fall     fell on the ice  pt has left hip pain  was unable to get up       History of Illness: 70 y o male who sustained a mechanical fall yesterday afternoon  Patient states he slipped on ice and fell directly onto his left side  He denies hitting his head or loss of consciousness  After the fall he noted severe left hip pain and inability to bear weight on the left lower extremity  He reported to the emergency department where x-rays revealed a left hip fracture  Currently his pain is well controlled  He denies numbness and tingling  No fevers or chills  He does not utilize any assistive device for ambulation      ED Vital Signs:   ED Triage Vitals [02/04/18 1332]   Temperature Pulse Respirations Blood Pressure SpO2   97 9 °F (36 6 °C) 78 20 158/82 92 %      Temp Source Heart Rate Source Patient Position - Orthostatic VS BP Location FiO2 (%)   Oral Monitor Lying Right arm --      Pain Score       7        Wt Readings from Last 1 Encounters:   02/04/18 76 8 kg (169 lb 5 oz)     Abnormal Labs/Diagnostic Test Results:   HCT 34 0 (L)   HGB 11 0 (L)   WBC 15 28 (H)     XR left hip, pelvis:  Intratrochanteric fracture of the left femur  CXR:  [facility]     ED Treatment:   Medication Administration from 02/04/2018 1319 to 02/04/2018 1607       Date/Time Order Dose Route Action     02/04/2018 1358 HYDROmorphone (DILAUDID) injection 1 mg 1 mg Intravenous Given     02/04/2018 1524 HYDROmorphone (DILAUDID) injection 1 mg 1 mg Intravenous Given          Past Medical/Surgical History: Active Ambulatory Problems     Diagnosis Date Noted    Gastritis 10/11/2016    Tobacco abuse 10/11/2016     Resolved Ambulatory Problems     Diagnosis Date Noted    Chest pain 10/11/2016     Past Medical History:   Diagnosis Date    Cancer [facility])     Femur fracture, right [facility] [address] )     Gastritis 10/11/2016    Hypertension     Tobacco abuse 10/11/2016       Admitting Diagnosis: Hip injury [S79 919A]  Closed fracture of left hip, initial encounter ([facility] [address] ) [S72 002A]    Age/Sex: 70 y o  male    Assessment:  70 y o male with left hip intertrochanteric fracture after mechanical fall on ice     Plan:   · Pain Control  · NWB LLE/bedrest  · NPO  · Type and screen ordered  · Plan for surgery today consisting of intramedullary nail fixation  Medically cleared for surgery      Admission Orders:  NPO  OR for insertion nail IM femur antegrade, left hip  Bed rest, non weight bearing  EKG  Consult cm, internal med  Foot pump    Scheduled Meds:   Current Facility-Administered Medications:  amLODIPine 5 mg Oral Daily Armando Portillo PA-C    cefazolin 1,000 mg Intravenous Once Sarah Mueller MD    docusate sodium 100 mg Oral BID Sarah Mueller MD    lactated ringers 75 mL/hr Intravenous Continuous Jeff Maria Isabel Mejias MD Last Rate: 75 mL/hr (02/05/18 0010)   lisinopril 10 mg Oral Daily Armando Portillo PA-C    morphine injection 2 mg Intravenous Q2H PRN Fabiola Posey MD    nicotine 1 patch Transdermal Daily Fabiola Posey MD    ondansetron 4 mg Intravenous Q6H PRN Fabiola Posey MD    oxyCODONE-acetaminophen 1 tablet Oral Q4H PRN Fabiola Posey MD    oxyCODONE-acetaminophen 2 tablet Oral Q4H PRN Fabiola Posey MD      Continuous Infusions:   lactated ringers 75 mL/hr Last Rate: 75 mL/hr (02/05/18 0010)     PRN Meds: morphine injection    ondansetron    oxyCODONE-acetaminophen    oxyCODONE-acetaminophen    Thank you,  [facility] in the [facility] [place] for 2017  Network Utilization Review Department  Phone: [phone]; Fax [phone]  ATTENTION: The Network Utilization Review Department is now centralized for our 7 Facilities  Make a note that we have a new phone and fax numbers for our Department  Please call with any questions or concerns to [phone] and carefully follow the prompts so that you are directed to the right person  All voicemails are confidential  Fax any determinations, approvals, denials, and requests for initial or continue stay review clinical to [phone]  Due to HIGH CALL volume, it would be easier if you could please send faxed requests to expedite your requests and in part, help us provide discharge notifications faster

## 2018-02-06 LAB
ANION GAP SERPL CALCULATED.3IONS-SCNC: 5 MMOL/L (ref 4–13)
BUN SERPL-MCNC: 24 MG/DL (ref 5–25)
CALCIUM SERPL-MCNC: 7.8 MG/DL (ref 8.3–10.1)
CHLORIDE SERPL-SCNC: 104 MMOL/L (ref 100–108)
CO2 SERPL-SCNC: 29 MMOL/L (ref 21–32)
CREAT SERPL-MCNC: 1.18 MG/DL (ref 0.6–1.3)
ERYTHROCYTE [DISTWIDTH] IN BLOOD BY AUTOMATED COUNT: 13.5 % (ref 11.6–15.1)
GFR SERPL CREATININE-BSD FRML MDRD: 63 ML/MIN/1.73SQ M
GLUCOSE SERPL-MCNC: 95 MG/DL (ref 65–140)
HCT VFR BLD AUTO: 29.6 % (ref 36.5–49.3)
HGB BLD-MCNC: 9.4 G/DL (ref 12–17)
MCH RBC QN AUTO: 28.2 PG (ref 26.8–34.3)
MCHC RBC AUTO-ENTMCNC: 31.8 G/DL (ref 31.4–37.4)
MCV RBC AUTO: 89 FL (ref 82–98)
PLATELET # BLD AUTO: 211 THOUSANDS/UL (ref 149–390)
PMV BLD AUTO: 8.7 FL (ref 8.9–12.7)
POTASSIUM SERPL-SCNC: 4.2 MMOL/L (ref 3.5–5.3)
RBC # BLD AUTO: 3.33 MILLION/UL (ref 3.88–5.62)
SODIUM SERPL-SCNC: 138 MMOL/L (ref 136–145)
WBC # BLD AUTO: 14.8 THOUSAND/UL (ref 4.31–10.16)

## 2018-02-06 PROCEDURE — 80048 BASIC METABOLIC PNL TOTAL CA: CPT | Performed by: ORTHOPAEDIC SURGERY

## 2018-02-06 PROCEDURE — 97110 THERAPEUTIC EXERCISES: CPT

## 2018-02-06 PROCEDURE — 97163 PT EVAL HIGH COMPLEX 45 MIN: CPT

## 2018-02-06 PROCEDURE — G8979 MOBILITY GOAL STATUS: HCPCS

## 2018-02-06 PROCEDURE — 97116 GAIT TRAINING THERAPY: CPT

## 2018-02-06 PROCEDURE — 97167 OT EVAL HIGH COMPLEX 60 MIN: CPT

## 2018-02-06 PROCEDURE — G8978 MOBILITY CURRENT STATUS: HCPCS

## 2018-02-06 PROCEDURE — G8987 SELF CARE CURRENT STATUS: HCPCS

## 2018-02-06 PROCEDURE — 0HBRXZZ EXCISION OF TOE NAIL, EXTERNAL APPROACH: ICD-10-PCS | Performed by: PODIATRIST

## 2018-02-06 PROCEDURE — 85027 COMPLETE CBC AUTOMATED: CPT | Performed by: ORTHOPAEDIC SURGERY

## 2018-02-06 PROCEDURE — 99024 POSTOP FOLLOW-UP VISIT: CPT | Performed by: PHYSICIAN ASSISTANT

## 2018-02-06 PROCEDURE — G8988 SELF CARE GOAL STATUS: HCPCS

## 2018-02-06 RX ADMIN — ACETAMINOPHEN 650 MG: 325 TABLET, FILM COATED ORAL at 10:30

## 2018-02-06 RX ADMIN — ENOXAPARIN SODIUM 40 MG: 40 INJECTION SUBCUTANEOUS at 08:56

## 2018-02-06 RX ADMIN — CEFAZOLIN SODIUM 1000 MG: 1 SOLUTION INTRAVENOUS at 03:08

## 2018-02-06 RX ADMIN — SODIUM CHLORIDE, SODIUM LACTATE, POTASSIUM CHLORIDE, AND CALCIUM CHLORIDE 100 ML/HR: .6; .31; .03; .02 INJECTION, SOLUTION INTRAVENOUS at 21:09

## 2018-02-06 RX ADMIN — DOCUSATE SODIUM 100 MG: 100 CAPSULE, LIQUID FILLED ORAL at 08:56

## 2018-02-06 RX ADMIN — CEFAZOLIN SODIUM 1000 MG: 1 SOLUTION INTRAVENOUS at 09:00

## 2018-02-06 RX ADMIN — ACETAMINOPHEN 650 MG: 325 TABLET, FILM COATED ORAL at 17:04

## 2018-02-06 RX ADMIN — SODIUM CHLORIDE, SODIUM LACTATE, POTASSIUM CHLORIDE, AND CALCIUM CHLORIDE 100 ML/HR: .6; .31; .03; .02 INJECTION, SOLUTION INTRAVENOUS at 09:53

## 2018-02-06 RX ADMIN — ASPIRIN 81 MG 81 MG: 81 TABLET ORAL at 08:56

## 2018-02-06 RX ADMIN — DOCUSATE SODIUM 100 MG: 100 CAPSULE, LIQUID FILLED ORAL at 17:04

## 2018-02-06 NOTE — ANESTHESIA POSTPROCEDURE EVALUATION
Post-Op Assessment Note      CV Status:  Stable    Mental Status:  Alert and awake    Hydration Status:  Stable and euvolemic    PONV Controlled:  Controlled    Airway Patency:  Patent and adequate    Post Op Vitals Reviewed: Yes          Staff: CRNA           /72 (02/05/18 2001)    Temp     Pulse     Resp 20 (02/05/18 2001)    SpO2 97 % (02/05/18 2001)

## 2018-02-06 NOTE — PLAN OF CARE
Problem: OCCUPATIONAL THERAPY ADULT  Goal: Performs self-care activities at highest level of function for planned discharge setting  See evaluation for individualized goals  Treatment Interventions: ADL retraining, Functional transfer training, Cognitive reorientation, Patient/family training, Equipment evaluation/education, Activityengagement, Continued evaluation  Equipment Recommended: Tub seat with back       See flowsheet documentation for full assessment, interventions and recommendations  Limitation: Decreased ADL status, Decreased Safe judgement during ADL, Decreased endurance, Decreased self-care trans, Decreased high-level ADLs     Assessment: Pt is a 69 yo male admitted to [facility] on 2/4/2018  Pt presents w/ intertrochanteric fracture left femur and significant PMH impacting his occupational performance including HTN, hx R femur fracture, hx prostate ca  Pt reports living w/ wife in 2 [facility] w/ 1 JENARO PTA  Pt reports I w/ ADL/ 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  Pt demonstrated B UE AROM WFL  Pt completed bed mobility supine to sit w/ min A x1 and increased time  Pt completed LBD w/ max A to don socks  Pt completed feeding w/ mod I after set- up  Pt reports decreased appetite  Pt reports no O2 use at home, and currently on 2L O2  Pt benefits from significant cues to consistently follow directions and demonstrate carryover  Pt presents w/ decreased recall / carryover precautions, decreased standing balance, decreased endurance, decreased activity tolerance impacting his I w/ dressing, bathing, oral hygiene, functional mobility, functional transfers, food prep/ clean up, community mobility  Pt would benefit from OT while in acute care to address deficits  From an OT persepective, pt would benefit from OT while in acute care to address deficits  From an OT perspective, pt would benefit from post acute rehab when medically stable for discharge at this point    Will continue to follow pt while in acute care     OT Discharge Recommendation: Other (Comment) (post acute rehab at this time)  OT - OK to Discharge:  (to post acute rehab at this time when stable)

## 2018-02-06 NOTE — PLAN OF CARE
Problem: PHYSICAL THERAPY ADULT  Goal: Performs mobility at highest level of function for planned discharge setting  See evaluation for individualized goals  Treatment/Interventions: Elevations, LE strengthening/ROM, Functional transfer training, Therapeutic exercise, Endurance training, Cognitive reorientation, Gait training, Bed mobility, Patient/family training, Equipment eval/education, Spoke to nursing, OT  Equipment Recommended: Meggan Wang       See flowsheet documentation for full assessment, interventions and recommendations  Prognosis: Fair  Problem List: Decreased strength, Decreased range of motion, Decreased endurance, Impaired balance, Decreased mobility, Decreased coordination, Decreased cognition, Decreased safety awareness, Pain, Orthopedic restrictions  Assessment: Pt is a 70 y o  male seen for PT evaluation s/p admit to [facility] on 2/4/2018 w/ Intertrochanteric fracture of left femur, closed, initial encounter ([address] )  Had LEFT HIP SHORT TROCHANTERIC FEMORAL NAIL 2/5/2018  WBAT LLE  Order placed for PT  Prior to admission, pt was independent w/ all functional mobility w/ o device, lived in multi-level home, 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, had JENARO  Upon evaluation: Pt requires min assistance for bed mobility, transfers, and ambulation with rolling walker  Pt's clinical presentation is currently unstable/unpredictable given the functional mobility deficits above, especially weakness, decreased ROM, impaired balance, decreased endurance, gait deviations, decreased functional mobility tolerance, fall risk and decreased cognition, combined with medical complications of hypotension, abnormal H&H, abnormal WBCs and history of falls  Pt to benefit from continued skilled PT tx while in hospital and upon DC to address deficits as defined above and maximize level of functional mobility   From PT/mobility standpoint, recommendation at time of d/c would be inpatient rehab and with rolling walker pending progress in order to maximize pt's functional independence and consistency w/ mobility in order to facilitate return to PLOF, especially due to his limited recall of instructions/impulsivity   Recommend trial with walker next 1-2 sessions, ther ex next 1-2 session, OT consult and case management consult  Barriers to Discharge: Decreased caregiver support, Inaccessible home environment     Recommendation: Post acute IP rehab, OT consult, Rehab consult          See flowsheet documentation for full assessment

## 2018-02-06 NOTE — OCCUPATIONAL THERAPY NOTE
[address] Evaluation     Patient Name: Braydon Farrar  ETZBS'D Date: 2/6/2018  Problem List  Patient Active Problem List   Diagnosis    Gastritis    Tobacco abuse    Intertrochanteric fracture of left femur, closed, initial encounter [facility])    Essential hypertension     Past Medical History  Past Medical History:   Diagnosis Date    Cancer ([facility] [address] )     PROSTATE    Femur fracture, right ([facility] [address] )     Gastritis 10/11/2016    Hypertension     Tobacco abuse 10/11/2016     Past Surgical History  Past Surgical History:   Procedure Laterality Date    CHOLECYSTECTOMY      FRACTURE SURGERY      IA OPEN RX FEMUR FX+INTRAMED ELVA Left 2/5/2018    Procedure: LEFT HIP SHORT TROCHANTERIC FEMORAL NAIL;  Surgeon: Alpa Araujo MD;  Location: [facility] Main OR;  Service: Orthopedics    PROSTATECTOMY        02/06/18 3735   Note Type   Note type Eval/Treat   Restrictions/Precautions   Weight Bearing Precautions Per Order Yes   LLE Weight Bearing Per Order WBAT   Other Precautions O2;Telemetry; Fall Risk;Pain;WBS;Chair Alarm; Bed Alarm   Pain Assessment   Pain Assessment No/denies pain  (upon therapist arrival resting in bed)   Pain Score No Pain   Home Living   Type of [address] [place] [address] Two level;1/2 bath on main level;Bed/bath upstairs; Other (Comment)  (1 JENARO)   Bathroom Shower/Tub Tub/shower unit   Bathroom Toilet Standard   Bathroom Equipment Other (Comment)  (no DME or grab bars)   Bathroom Accessibility Other (Comment)  (1/2 bath main; full bath second floor)   [address] [facility]; Other (Comment)  (was not using)   Additional Comments Pt reports living w/ wife in 2 [address] [place] [address] on [address] farm   Prior Function   Level of [place] Independent with ADLs and functional mobility   Lives With [place] Help From Family   ADL Assistance Independent   IADLs Independent   Falls in the last 6 months 1 to 4   Vocational Full time employment  (pt reports living on farm)   Comments Pt reports I w/ ADL/ IADL PTA including 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   Lifestyle   Autonomy Pt reports I w/ 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   Reciprocal Relationships Pt reports living w/ wife in 2 [facility] w/ 1 JENARO PTA  Pt reports supportive and involved family that assist w/ famr (son and son in law)   Service to Others 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   Intrinsic Gratification Pt reports enjoying taking care of farm   ADL   Eating Assistance 6  Modified independent   Eating Deficit Setup; Other (Comment)  (decreased appetite)   Grooming Assistance 5  Supervision/Setup   Grooming Deficit Setup   UB Dressing Assistance 5  Supervision/Setup   UB Dressing Deficit Setup; Increased time to complete   LB Dressing Assistance 2  Maximal Assistance   LB Dressing Deficit Setup;Supervision/safety; Don/doff R sock; Don/doff L sock   Additional Comments on 2 L O2; O2 sats dropped to 89% on room air while engaged in conversation resting in bed  w/ 2L O2 recovered to 92%  /64 upon therapist arrival at rest    Bed Mobility   Supine to Sit 4  Minimal assistance   Additional items Assist x 1; Increased time required;Verbal cues   Sit to Supine Unable to assess   Additional Comments Pt seated OOB in chair post eval w/ call bell in reach and chair alarm activated   Transfers   Sit to Stand 4  Minimal assistance   Additional items Assist x 1; Increased time required;Verbal cues; Impulsive   Stand to Sit 4  Minimal assistance   Additional items Increased time required; Impulsive;Verbal cues;Armrests   Additional Comments Redirection / cues to consistenlt demonstrate carryover of directions   Functional Mobility   Functional Mobility 5  Supervision   Additional Comments increased time, and cues for sequencing / walker mgmt   Additional items Rolling walker   Activity Tolerance   Activity Tolerance Patient limited by pain;Treatment limited secondary to medical complications (Comment)  (low BP)   Medical Staff Made Aware spoke to PT, [facility]   Nurse Made Aware spoke to Asher GEE Her   RUE Assessment   RUE Assessment [facility]   RU Strength   RUE Overall Strength Within Functional Limits - able to perform ADL tasks with strength   LUE Assessment   LUE Assessment WFL   LUE Strength   LUE Overall Strength Within Functional Limits - able to perform ADL tasks with strength   Hand Function   Fine Motor Coordination Functional   Vision-Basic Assessment   Current Vision (glasses for reading / distance)   Cognition   Overall Cognitive Status Impaired   Arousal/Participation Alert; Cooperative   Attention Attends with cues to redirect  (redirection to follow directions; improve carryover)   Orientation Level Oriented to person;Oriented to place;Oriented to situation   Memory Decreased recall of precautions   Following Commands Follows one step commands with increased time or repetition   Comments Identified pt by full name and birthdate  Pt able to provide social history  Pt able to participate in converation w/ increased time, min cues  Frequent redirection to sustain attention to task, and consistently demonstrate good recall / carryover    Assessment   Limitation Decreased ADL status; Decreased Safe judgement during ADL;Decreased endurance;Decreased self-care trans;Decreased high-level ADLs   Assessment Pt is a 71 yo male [facility] on 2/4/2018  Pt presents w/ intertrochanteric fracture left femur and significant PMH impacting his occupational performance including HTN, hx R femur fracture, hx prostate ca  Pt reports living w/ wife in 2 [facility] w/ 1 JENARO PTA  Pt reports I w/ ADL/ 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  Pt demonstrated B UE AROM WFL  Pt completed bed mobility supine to sit w/ min A x1 and increased time  Pt completed LBD w/ max A to don socks  Pt completed feeding w/ mod I after set- up  Pt reports decreased appetite  Pt reports no O2 use at home, and currently on 2L O2    Pt benefits from significant cues to consistently follow directions and demonstrate carryover  Pt presents w/ decreased recall / carryover precautions, decreased standing balance, decreased endurance, decreased activity tolerance impacting his I w/ dressing, bathing, oral hygiene, functional mobility, functional transfers, food prep/ clean up, community mobility  Pt would benefit from OT while in acute care to address deficits  From an OT persepective, pt would benefit from OT while in acute care to address deficits  From an OT perspective, pt would benefit from post acute rehab when medically stable for discharge at this point  Will continue to follow pt while in acute care   Plan   Treatment Interventions ADL retraining;Functional transfer training;Cognitive reorientation;Patient/family training;Equipment evaluation/education; Activityengagement;Continued evaluation   Goal Expiration Date 02/13/18   OT Frequency 3-5x/wk   Recommendation   OT Discharge Recommendation Other (Comment)  (post acute rehab at this time)   Equipment Recommended Tub seat with back   OT - OK to Discharge (to post acute rehab at this time when stable)   Barthel Index   Feeding 10   Bathing 0   Grooming Score 5   Dressing Score 5   Bladder Score 5   Bowels Score 10   Toilet Use Score 5   Transfers (Bed/Chair) Score 10   Mobility (Level Surface) Score 0   Stairs Score 0   Barthel Index Score 50   Pt goals to be met in 7 days:  1  Pt will complete bed mobility supine <> sit w/ mod I  2  Pt will complete UBD w/ mod I after set- up  3  Pt will complete LBD w/ mod I after set- up demonstrating understanding of compensatory strategies / AE as needed  4  Pt will complete grooming w/ mod I after set- up  5  Pt will complete functional transfers to bed, chair, and toilet w/ mod I using least restrictive device  6  Pt will complete functional tub /shower transfer w/ mod I using DME as needed  7   Pt will demonstrate good attention and participation in continued evaluation to assess functional cognitive skills and for DME needs upon discharge from acute care  8  Pt will consistently demonstrate good attention and verbalize understanding of safety precautions during ADL performance  9  Pt will demonstrate understanding of safety precautions during ADL performance  10   Pt will demonstrate good recall and carryover of multi step directions during ADL performance  Shyla Michel, OT

## 2018-02-06 NOTE — OP NOTE
OPERATIVE REPORT  PATIENT NAME: Nannette Jimenez    :  1950  MRN: [id number]  Pt Location: [facility] OR ROOM 01    SURGERY DATE: 2018    Surgeon(s) and Role:     * Ajith Castañeda MD - Primary    Pre-Op Diagnosis Codes:     * Closed intertrochanteric fracture of left femur ([facility] [address] ) [S72 142A]    Post-Op Diagnosis Codes:     * Closed intertrochanteric fracture of left femur ([facility] [address] ) [S72 142A]    Procedure(s) (LRB):  LEFT HIP SHORT TROCHANTERIC FEMORAL NAIL    Specimen(s):  * No specimens in log *    Estimated Blood Loss:   50 mL    Drains:  None    Anesthesia Type:   General    Complications:   None    Procedure and Technique:  The patient was identified in the pre-operative holding area, and the surgical site was marked by the surgeon  The patient was transported to the operating room  Spinal anesthesia was administered in the lateral decubitus position on the stretcher  The patient was then placed in the supine position on the fracture table  A well-padded perineal post was placed  The feet were padded and secured to the fracture table  Traction was applied to the operative extremity to reduce the fracture  The left hip was prepped and draped in the usual sterile fashion  Prophylactic antibiotics were given within 1 hour of incision  Following a surgical timeout, a 5 cm lateral incision was made proximal to the greater trochanter  Deep dissection was carried out sharply through the fascia rosa  The guide wire for the Synthes Trochanteric Fixation Nail (TFN) System was advanced into the femur from a trochanteric starting point  The 17 0 mm cannulated drill bit was passed over the guide wire with the soft tissue protector in place  The short TFN 11 0 x 170 mm was inserted manually following removal of the guide wire  The guide wire for the helical blade was then advanced into the femoral head through a small lateral incision using the external guide    Fluoroscopic imaging confirmed satisfactory pin placement within the femoral head  A depth measurement was obtained, and the outer cortex was drilled  The stepped cannulated drill bit was advanced into the femoral head for the measured blade length  The helical blade 38 2 x 95 mm was inserted, and the preassembled locking mechanism was tightened within the proximal nail  The helical blade  and guide wire were removed  A 5 0 x 36 mm locking screw was placed through a small lateral thigh incision using the external guide  Final fluoroscopic images were obtained in multiple planes to confirm satisfactory fracture reduction and hardware position  The wounds were then irrigated with sterile saline solution  The fascia rosa was closed with 0-Vicryl suture in an interrupted figure-of-eight fashion  The subcuticular layer was closed with a simple buried 2-0 Vicryl suture, and the skin was closed with staples  The wounds were dressed with sterile Mepilex bandages      Patient Disposition:  PACU  and extubated and stable    SIGNATURE: Jennyfer Madrid MD  DATE: February 5, 2018  TIME: 7:57 PM

## 2018-02-06 NOTE — PHYSICAL THERAPY NOTE
PHYSICAL THERAPY EVALUATION  NAME:  Nav Lorenzo  DATE: 02/06/18    AGE:   70 y o   Mrn:   [id number]  ADMIT DX:  Hip injury [S79 105C]  Closed fracture of left hip, initial encounter ([facility] ) Blanca Oropeza    Past Medical History:   Diagnosis Date    Cancer ([facility] )     PROSTATE    Femur fracture, right ([facility] )     Gastritis 10/11/2016    Hypertension     Tobacco abuse 10/11/2016     Length Of Stay: 2  Performed at least 2 patient identifiers during session: Name and wristband    PHYSICAL THERAPY EVALUATION :    02/06/18 1508   Note Type   Note type Eval/Treat   Pain Assessment   Pain Assessment No/denies pain  (at rest, increased with activity)   Pain Type Acute pain;Surgical pain   Pain Location Hip;Leg   Pain Orientation Left   Home Living   Type of [address] Two level;1/2 bath on main level;Bed/bath upstairs  (2 JENARO)   Home Equipment Walker   Additional Comments Has a walker @ home w/wheels that he got back when he had prostate surgery   Prior Function   Level of [place] Independent with ADLs and functional mobility   Lives With Spouse   ADL Assistance Independent   IADLs Independent   Falls in the last 6 months 1 to 4   Vocational Full time employment  ([employer] [address])   Restrictions/Precautions   [place] Bearing Precautions Per Order Yes   LLE [place] Bearing Per Order WBAT   Other Precautions Fall Risk;Pain;O2  (O2 )   General   Additional Pertinent History Likes to be called "Jocelin"   LEFT HIP SHORT TROCHANTERIC FEMORAL NAIL 2/5/2018   Family/Caregiver Present Yes   Cognition   Overall Cognitive Status Impaired   Arousal/Participation Alert   Orientation Level Oriented to person   Following Commands Follows one step commands inconsistently   Comments limited command following and carry over of instructions   RUE Strength   RUE Overall Strength Within Functional Limits - able to perform ADL tasks with strength   LUE Strength   LUE Overall Strength Within Functional Limits - able to perform ADL tasks with strength   Strength RLE   R Hip Flexion 4/5   R Knee Extension 4/5   R Ankle Dorsiflexion 4/5   Strength LLE   L Hip Flexion 2-/5   L Hip ABduction 2/5   L Hip ADduction 2/5   L Knee Extension 3/5   L Ankle Dorsiflexion 4/5   Coordination   Movements are Fluid and Coordinated 1   Sensation X  (? [place]; vision WFL)   Light Touch   RLE Light Touch Grossly intact   LLE Light Touch Grossly intact   Bed Mobility   Supine to Sit 4  Minimal assistance   Additional items Assist x 1; Increased time required;Verbal cues;LE management;HOB elevated   Transfers   Sit to Stand 4  Minimal assistance   Additional items Assist x 1; Increased time required;Verbal cues;Armrests   Stand to Sit 4  Minimal assistance   Additional items Increased time required;Verbal cues;Armrests   Ambulation/Elevation   Gait pattern Inconsistent claudia;Decreased L stance;L Knee [place]; Antalgic   Gait Assistance 4  Minimal assist   Additional items Assist x 1;Verbal cues; Tactile cues   Assistive Device Rolling walker   Distance 3'   Balance   Static Sitting Fair +   Static Standing Fair -   Ambulatory Poor +   Endurance Deficit   Endurance Deficit Yes   Endurance Deficit Description limited amb distance   Activity Tolerance   Activity Tolerance Patient limited by fatigue;Patient limited by pain   Medical Staff Made Aware spoke to [facility] OT   Nurse Made Aware spoke to [facility]   Assessment   Prognosis Fair   Problem List Decreased strength;Decreased range of motion;Decreased endurance; Impaired balance;Decreased mobility; Decreased coordination;Decreased cognition;Decreased safety awareness;Pain;Orthopedic restrictions   Barriers to Discharge Decreased caregiver support; Inaccessible home environment   Goals   Patient Goals less pain   STG Expiration Date 02/16/18   Short Term Goal #1 Pt will perform transfers consistent modified I w/o verbal instruction, bed mobility modified I, amb w/least restrictive device for > 150' w/o uncorrected losses of balance, perform at least 4 stairs w/S, indep with HEP, increase LLE strength @ hip and knee by 1 grade  Treatment Day 1  (treatment documented in note section)   Plan   Treatment/Interventions Elevations;LE strengthening/ROM; Functional transfer training; Therapeutic exercise; Endurance training;Cognitive reorientation;Gait training;Bed mobility; Patient/family training;Equipment eval/education;Spoke to nursing;OT   PT Frequency 5x/wk; Weekend  (1x/weekend)   Recommendation   Recommendation Post acute IP rehab;OT consult; Rehab consult   Equipment Recommended Walker   Barthel Index   Feeding 10   Bathing 0   Grooming Score 5   Dressing Score 5   Bladder Score 5   Bowels Score 10   Toilet Use Score 5   Transfers (Bed/Chair) Score 10   Mobility (Level Surface) Score 0   Stairs Score 0   Barthel Index Score 50   Pt requires PT /OT co-eval due to signficant assistance with mobility and cognitive-behavioral impairments  (Please find full objective findings from PT assessment regarding body systems outlined above)  Assessment: Pt is a 70 y o  male seen for PT evaluation s/p admit to [facility] on 2/4/2018 w/ Intertrochanteric fracture of left femur, closed, initial encounter ([facility] )  Had LEFT HIP SHORT TROCHANTERIC FEMORAL NAIL 2/5/2018  WBAT LLE  Order placed for PT  Prior to admission, pt was independent w/ all functional mobility w/ o device, lived in multi-level home, 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, had JENARO  Upon evaluation: Pt requires min assistance for bed mobility, transfers, and ambulation with rolling walker  Pt's clinical presentation is currently unstable/unpredictable given the functional mobility deficits above, especially weakness, decreased ROM, impaired balance, decreased endurance, gait deviations, decreased functional mobility tolerance, fall risk and decreased cognition, combined with medical complications of hypotension, abnormal H&H, abnormal WBCs and history of falls    Pt to benefit from continued skilled PT tx while in hospital and upon DC to address deficits as defined above and maximize level of functional mobility  From PT/mobility standpoint, recommendation at time of d/c would be inpatient rehab and with rolling walker pending progress in order to maximize pt's functional independence and consistency w/ mobility in order to facilitate return to PLOF, especially due to his limited recall of instructions/impulsivity   Recommend trial with walker next 1-2 sessions, ther ex next 1-2 session, OT consult and case management consult  The following objective measures were performed on IE: Barthel Index 50/100  Comorbidities affecting pt's physical performance at time of assessment include: HTN, orthopedic surgery, cancer history and/or treatment and distanct femur fx surgery, tobacco abuse  Personal factors affecting pt at time of IE include: steps to enter environment, multi-level environment, behavioral pattern, inability to perform current job functions, limited insight into impairments and recent fall(s)  Aden Old, PT, DPT        PHYSICAL THERAPY TREATMENT NOTE  Time In: 15:21  Time Out: 15:45  Total Time: 23 min    S:  Pt agreeable to performing therex and additional ambulation trials  O:  Transfers min A and verbal instruction for hand and foot placement  Amb w/rolling walker for 40' with min A and verbal instruction for sequencing and foot placement inside of walker  Exercises    Side/Reps/sets   Heelslides L AAROM 10 reps   Hip Abduction L AAROM 10 reps   Hip Adduction L AAROM 10 reps to neutral   Ankle Pumps L AAROM 10 reps   Quad sets L AAROM 10 reps   Hamstring Stretch L PROM 20 seconds   Education Instruction for technique     A:  Pt requires 50% physical assistance to perform exercises, and requires >50% verbal instruction or tactile feedback to perform exercises with proper form and technique   Pt requires similar A and verbal instruction for transfers and ambulation, but is able to walk further during treatment session  P:  Recommend addition of therapeutic exercises to current functional mobility program  Recommend continued trials with rolling walker for amb   Chair alarm placed in pt's room     Jacqueline Kumar PT, DPT

## 2018-02-06 NOTE — CONSULTS
Consult - Podiatry   Nav Dixon 70 y o  male MRN: [id number]  Unit/Bed#: -01 Encounter: [id number]    Assessment/Plan     Assessment:  1  Onychomycosis X 10  2  Hyperkeratotic skin lesion right foot  Plan:  - Nails debrided x10 without incidence utilizing a sharp nail nipper  - HPK was trimmed  Discussed proper foot care  - All questions and concerns addressed and will follow him up as out-patient in about 2 months  Thank you for the consult  History of Present Illness     Nav Victoria is a 70 y o  male who presents with elongated toenails and callus with pain  No acute pedal disorder  He has difficulty applying their socks and shoes  The pressure within their shoe gear is painful and they have been unable to cut their nails adequately  Consults  Review of Systems   Constitutional: Negative  HENT: Negative  Eyes: Negative  Respiratory: Negative  Cardiovascular: Negative  Gastrointestinal: Negative  Musculoskeletal: S/P hip fracture   Skin: elongated thickened toenails  Neurological: Negative  Historical Information   Past Medical History:   Diagnosis Date    Cancer [facility])     PROSTATE    Femur fracture, right ([address] )     Gastritis 10/11/2016    Hypertension     Tobacco abuse 10/11/2016     Past Surgical History:   Procedure Laterality Date    CHOLECYSTECTOMY      FRACTURE SURGERY      MO OPEN RX FEMUR FX+INTRAMED ELVA Left 2/5/2018    Procedure: LEFT HIP SHORT TROCHANTERIC FEMORAL NAIL;  Surgeon: Ajith Castañeda MD;  Location: [facility];  Service: Orthopedics    PROSTATECTOMY       Social History   History   Alcohol Use No     History   Drug Use No     History   Smoking Status    Current Every Day Smoker    Packs/day: 2 00    Years: 50 00    Types: Cigarettes   Smokeless Tobacco    Never Used     Family History: History reviewed  No pertinent family history      Meds/Allergies   Prescriptions Prior to Admission   Medication    amLODIPine (NORVASC) 2 5 mg tablet    aspirin 81 MG tablet    lisinopril (ZESTRIL) 10 mg tablet     No Known Allergies    Objective   First Vitals:   Blood Pressure: 158/82 (02/04/18 1332)  Pulse: 78 (02/04/18 1332)  Temperature: 97 9 °F (36 6 °C) (02/04/18 1332)  Temp Source: Oral (02/04/18 1332)  Respirations: 20 (02/04/18 1332)  Height: 5' 6" (167 6 cm) (02/04/18 1630)  Weight - Scale: 75 3 kg (166 lb 0 1 oz) (02/04/18 1332)  SpO2: 92 % (02/04/18 1332)    Current Vitals:   Blood Pressure: 112/50 (02/06/18 1100)  Pulse: 78 (02/06/18 1100)  Temperature: 98 8 °F (37 1 °C) (02/06/18 1100)  Temp Source: Oral (02/06/18 1100)  Respirations: 18 (02/06/18 1100)  Height: 5' 6" (167 6 cm) (02/04/18 1630)  Weight - Scale: 76 8 kg (169 lb 5 oz) (02/04/18 1630)  SpO2: 97 % (02/06/18 1100)        /50 (BP Location: Right arm)   Pulse 78   Temp 98 8 °F (37 1 °C) (Oral)   Resp 18   Ht 5' 6" (1 676 m)   Wt 76 8 kg (169 lb 5 oz)   SpO2 97%   BMI 27 33 kg/m²     General Appearance:    Alert, cooperative, no distress   Head:    Normocephalic, without obvious abnormality, atraumatic                   Neck:   Supple, symmetrical, no JVD   Back:     Symmetric, no CVA tenderness   Lungs:     Clear to auscultation bilaterally, respirations unlabored       Heart:    Regular rate and rhythm   Abdomen:     Soft, non-tender  Extremities:  S/P ORIF hip  No calf pain  Pulses:   Pedal pulses are intact  CFT< 3sec to all digits   Skin:   Nails are thickened, severely elongated with notable subungual debris and onycholysis  Skin is dry  Hyperkeratosis noted right 5th met  No wound presents in his feet/ankles  Neurologic:   Gross sensation is intact  AAO X 3  No focal neurologic deficit             Lab Results:   Admission on 02/04/2018   Component Date Value    WBC 02/04/2018 15 28*    RBC 02/04/2018 4 49     Hemoglobin 02/04/2018 12 9     Hematocrit 02/04/2018 39 4     MCV 02/04/2018 88     MCH 02/04/2018 28 7     [facility] 02/04/2018 32 7     RDW 02/04/2018 13 9     MPV 02/04/2018 9 4     Platelets 65/62/4546 269     Neutrophils Relative 02/04/2018 83*    Lymphocytes Relative 02/04/2018 8*    Monocytes Relative 02/04/2018 7     Eosinophils Relative 02/04/2018 2     Basophils Relative 02/04/2018 0     Neutrophils Absolute 02/04/2018 12 73*    Lymphocytes Absolute 02/04/2018 1 15     Monocytes Absolute 02/04/2018 1 10     Eosinophils Absolute 02/04/2018 0 24     Basophils Absolute 02/04/2018 0 06     Sodium 02/04/2018 138     Potassium 02/04/2018 4 1     Chloride 02/04/2018 102     CO2 02/04/2018 27     Anion Gap 02/04/2018 9     BUN 02/04/2018 25     Creatinine 02/04/2018 1 14     Glucose 02/04/2018 97     Calcium 02/04/2018 9 0     eGFR 02/04/2018 65     Protime 02/04/2018 12 7     INR 02/04/2018 0 93     PTT 02/04/2018 24     Sodium 02/05/2018 139     Potassium 02/05/2018 4 2     Chloride 02/05/2018 103     CO2 02/05/2018 26     Anion Gap 02/05/2018 10     BUN 02/05/2018 25     Creatinine 02/05/2018 1 20     Glucose 02/05/2018 103     Calcium 02/05/2018 8 4     eGFR 02/05/2018 61     WBC 02/05/2018 10 11     RBC 02/05/2018 3 86*    Hemoglobin 02/05/2018 11 0*    Hematocrit 02/05/2018 34 0*    MCV 02/05/2018 88     MCH 02/05/2018 28 5     MCHC 02/05/2018 32 4     RDW 02/05/2018 13 8     Platelets 04/08/4420 232     MPV 02/05/2018 8 3*    Ventricular Rate 02/04/2018 77     Atrial Rate 02/04/2018 77     AK Interval 02/04/2018 192     QRSD Interval 02/04/2018 94     QT Interval 02/04/2018 386     QTC Interval 02/04/2018 436     P Axis 02/04/2018 67     QRS Axis 02/04/2018 41     T Wave Axis 02/04/2018 71     ABO Grouping 02/05/2018 A     Rh Factor 02/05/2018 Positive     Antibody Screen 02/05/2018 Negative     Specimen Expiration Date 02/05/2018 [id number]     Ventricular Rate 02/04/2018 76     Atrial Rate 02/04/2018 76     AK Interval 02/04/2018 194     QRSD Interval 02/04/2018 94     QT Interval 02/04/2018 368     QTC Interval 02/04/2018 414     P Axis 02/04/2018 70     QRS Axis 02/04/2018 71     T Wave Axis 02/04/2018 85     Sodium 02/06/2018 138     Potassium 02/06/2018 4 2     Chloride 02/06/2018 104     CO2 02/06/2018 29     Anion Gap 02/06/2018 5     BUN 02/06/2018 24     Creatinine 02/06/2018 1 18     Glucose 02/06/2018 95     Calcium 02/06/2018 7 8*    eGFR 02/06/2018 63     WBC 02/06/2018 14 80*    RBC 02/06/2018 3 33*    Hemoglobin 02/06/2018 9 4*    Hematocrit 02/06/2018 29 6*    MCV 02/06/2018 89     MCH 02/06/2018 28 2     MCHC 02/06/2018 31 8     RDW 02/06/2018 13 5     Platelets 36/38/8520 211     MPV 02/06/2018 8 7*     Imaging: I have personally reviewed pertinent films in PACS  EKG, Pathology, and Other Studies: I have personally reviewed pertinent reports        Code Status: Level 1 - Full Code

## 2018-02-06 NOTE — POST OP PROGRESS NOTES
Orthopedics   Navindira Blanca 70 y o  male MRN: [id number]  Unit/Bed#: -01      Subjective:  Pt seen this am  Notes left hip pain is controlled and currently 1/10  No numbness/tingling   No fever/chills    Labs:    0  Lab Value Date/Time   HCT 29 6 (L) 02/06/2018 0545   HCT 34 0 (L) 02/05/2018 0425   HCT 39 4 02/04/2018 1448   HGB 9 4 (L) 02/06/2018 0545   HGB 11 0 (L) 02/05/2018 0425   HGB 12 9 02/04/2018 1448   INR 0 93 02/04/2018 1448   WBC 14 80 (H) 02/06/2018 0545   WBC 10 11 02/05/2018 0425   WBC 15 28 (H) 02/04/2018 1448       Meds:    Current Facility-Administered Medications:     acetaminophen (TYLENOL) tablet 650 mg, 650 mg, Oral, Q4H PRN, Sarina Edmondson MD    amLODIPine (NORVASC) tablet 5 mg, 5 mg, Oral, Daily, Sarina Edmondson MD    aspirin chewable tablet 81 mg, 81 mg, Oral, Daily, Sarina Edmondson MD    ceFAZolin (ANCEF) IVPB (premix) 1,000 mg, 1,000 mg, Intravenous, Q8H, Sarina Edmondson MD, Last Rate: 100 mL/hr at 02/06/18 0308, 1,000 mg at 02/06/18 0308    docusate sodium (COLACE) capsule 100 mg, 100 mg, Oral, BID, Sarina Edmondson MD, 100 mg at 02/05/18 2157    enoxaparin (LOVENOX) subcutaneous injection 40 mg, 40 mg, Subcutaneous, Q24H [address], Sarina Edmondson MD    lactated ringers infusion, 100 mL/hr, Intravenous, Continuous, Sarina Edmondson MD, Last Rate: 100 mL/hr at 02/05/18 2158, 100 mL/hr at 02/05/18 2158    lisinopril (ZESTRIL) tablet 10 mg, 10 mg, Oral, Daily, Sarina Edmondson MD    morphine injection 2 mg, 2 mg, Intravenous, Q2H PRN, Sarina Edmondson MD    ondansetron [facility]) injection 4 mg, 4 mg, Intravenous, Q6H PRN, Sarina Edmondson MD    oxyCODONE-acetaminophen (PERCOCET) 5-325 mg per tablet 1 tablet, 1 tablet, Oral, Q4H PRN, Sarina Edmondson MD, 1 tablet at 02/05/18 2157    oxyCODONE-acetaminophen (PERCOCET) 5-325 mg per tablet 2 tablet, 2 tablet, Oral, Q4H PRN, Sarina Edmondson MD    Physical exam:  Vitals:    02/06/18 0700   BP: 108/53   Pulse: 75   Resp: 18   Temp: 98 4 °F (36 9 °C)   SpO2: 96% Left Hip:  · Dressings C/D/I  · Thigh compartments soft  · Motor/sensation intact LLE  · Palpable DP pulse    _*_*_*_*_*_*_*_*_*_*_*_*_*_*_*_*_*_*_*_*_*_*_*_*_*_*_*_*_*_*_*_*_*_*_*_*_*_*_*_*_*    Assessment: 68 y o male POD 1 s/p Left Hip IM nail for IT fracture    Plan:  · Pain Control  · WBAT LLE  · PT/OT  · DVT proph, Lovenox  · Continue to monitor for ABLA  · D/C planning    Praveena Waggoner PA-C

## 2018-02-06 NOTE — PLAN OF CARE
Problem: DISCHARGE PLANNING - CARE MANAGEMENT  Goal: Discharge to post-acute care or home with appropriate resources  INTERVENTIONS:  - Conduct assessment to determine patient/family and health care team treatment goals, and need for post-acute services based on payer coverage, community resources, and patient preferences, and barriers to discharge  - Address psychosocial, clinical, and financial barriers to discharge as identified in assessment in conjunction with the patient/family and health care team  - Arrange appropriate level of post-acute services according to patients   needs and preference and payer coverage in collaboration with the physician and health care team  - Communicate with and update the patient/family, physician, and health care team regarding progress on the discharge plan  - Arrange appropriate transportation to post-acute venues  Outcome: Progressing  Cm met with patient at bedside in attempt to obtain SNF choice  Patient stated that he would need to check with his wife  Cm placed list at bedside as request by patient and he plans to discuss the information with her  CM will follow up tomorrow regarding placement options  CM will follow pt and work on discharge plan

## 2018-02-06 NOTE — DISCHARGE INSTRUCTIONS
Care after your surgery:  · Ice and elevate the surgical site 3-4 times a day for 15 minutes   · Keep the bandages dry at all times  Remove 3 days after surgery and resume showering if the incision is dry  · Apply a clean bandage daily after showering until the staples are removed  · Ambulate with an assistive device until cleared by the physical therapist   · Weight bearing as tolerated on the operative leg  · Continue Lovenox for 4 weeks from the surgery date to prevent blood clots  · Do not drive until cleared by the surgeon  Call the office at [phone] if you have any of the following:  · Excessive redness or drainage at the surgical site  · Fever above 101 5° F   · Pain unrelieved by medication  · Any numbness, tingling, or excessive swelling of the operative extremity  Follow-Up Appointment:  · 10-14 days after surgery with Dr Kenna Haq

## 2018-02-06 NOTE — SOCIAL WORK
Cm met with patient at bedside in attempt to obtain SNF choice  Patient stated that he would need to check with his wife  Cm placed list at bedside as request by patient and he plans to discuss the information with her  CM will follow up tomorrow regarding placement options  CM will follow pt and work on discharge plan

## 2018-02-07 VITALS
HEIGHT: 66 IN | SYSTOLIC BLOOD PRESSURE: 125 MMHG | DIASTOLIC BLOOD PRESSURE: 65 MMHG | OXYGEN SATURATION: 97 % | RESPIRATION RATE: 17 BRPM | TEMPERATURE: 98 F | HEART RATE: 82 BPM | BODY MASS INDEX: 27.21 KG/M2 | WEIGHT: 169.31 LBS

## 2018-02-07 PROBLEM — S72.142A INTERTROCHANTERIC FRACTURE OF LEFT FEMUR, CLOSED, INITIAL ENCOUNTER (HCC): Status: RESOLVED | Noted: 2018-02-04 | Resolved: 2018-02-07

## 2018-02-07 PROCEDURE — 99024 POSTOP FOLLOW-UP VISIT: CPT | Performed by: ORTHOPAEDIC SURGERY

## 2018-02-07 RX ORDER — POLYETHYLENE GLYCOL 3350 17 G/17G
17 POWDER, FOR SOLUTION ORAL ONCE
Status: COMPLETED | OUTPATIENT
Start: 2018-02-07 | End: 2018-02-07

## 2018-02-07 RX ORDER — OXYCODONE HYDROCHLORIDE 5 MG/1
TABLET ORAL
Qty: 60 TABLET | Refills: 0
Start: 2018-02-07 | End: 2018-05-01

## 2018-02-07 RX ADMIN — DOCUSATE SODIUM 100 MG: 100 CAPSULE, LIQUID FILLED ORAL at 07:51

## 2018-02-07 RX ADMIN — ACETAMINOPHEN 650 MG: 325 TABLET, FILM COATED ORAL at 07:39

## 2018-02-07 RX ADMIN — SODIUM CHLORIDE, SODIUM LACTATE, POTASSIUM CHLORIDE, AND CALCIUM CHLORIDE 100 ML/HR: .6; .31; .03; .02 INJECTION, SOLUTION INTRAVENOUS at 07:16

## 2018-02-07 RX ADMIN — ASPIRIN 81 MG 81 MG: 81 TABLET ORAL at 07:51

## 2018-02-07 RX ADMIN — ENOXAPARIN SODIUM 40 MG: 40 INJECTION SUBCUTANEOUS at 07:51

## 2018-02-07 RX ADMIN — POLYETHYLENE GLYCOL 3350 17 G: 17 POWDER, FOR SOLUTION ORAL at 08:57

## 2018-02-07 NOTE — PLAN OF CARE
Problem: DISCHARGE PLANNING - CARE MANAGEMENT  Goal: Discharge to post-acute care or home with appropriate resources  INTERVENTIONS:  - Conduct assessment to determine patient/family and health care team treatment goals, and need for post-acute services based on payer coverage, community resources, and patient preferences, and barriers to discharge  - Address psychosocial, clinical, and financial barriers to discharge as identified in assessment in conjunction with the patient/family and health care team  - Arrange appropriate level of post-acute services according to patients   needs and preference and payer coverage in collaboration with the physician and health care team  - Communicate with and update the patient/family, physician, and health care team regarding progress on the discharge plan  - Arrange appropriate transportation to post-acute venues   Outcome: Progressing  Patient is being review at [facility]  Patient has been accepted however, they are reviewing insurance and will follow up today  Patient has 3 different insurances at this time  Cm will continue to follow pt

## 2018-02-07 NOTE — PHYSICAL THERAPY NOTE
PHYSICAL THERAPY NOTE  Patient Name: Roxanne Harp  Today's Date: 2/7/2018    Spoke to [facility] from case management who [facility]   Clemente Keyes PT

## 2018-02-07 NOTE — SOCIAL WORK
Patient is able to transport from [facility] to [facility] [employer] can at 5:30pm  Patient wanted to talk with mary from ortho before leaving  Patient and spouse agreeable to transfer  Patient will obtain clothing tomorrow from his spouse  [employer] abi informed of transport  IMM reviewed with patient and family  Cost discussed with patient and family  No other needs at this time  Cm department will continue follow pt

## 2018-02-07 NOTE — PROGRESS NOTES
Orthopedics   Nav Blanca 70 y o  male MRN: [id number]  Unit/Bed#: -01      Subjective:  70 y o male post operative day 2 left femoral intramedullary nail  Pt doing well  Pain controlled  Labs:    0  Lab Value Date/Time   HCT 29 6 (L) 02/06/2018 0545   HCT 34 0 (L) 02/05/2018 0425   HCT 39 4 02/04/2018 1448   HGB 9 4 (L) 02/06/2018 0545   HGB 11 0 (L) 02/05/2018 0425   HGB 12 9 02/04/2018 1448   INR 0 93 02/04/2018 1448   WBC 14 80 (H) 02/06/2018 0545   WBC 10 11 02/05/2018 0425   WBC 15 28 (H) 02/04/2018 1448       Meds:    Current Facility-Administered Medications:     acetaminophen (TYLENOL) tablet 650 mg, 650 mg, Oral, Q4H PRN, Sarina Edmondson MD, 650 mg at 02/07/18 0739    amLODIPine (NORVASC) tablet 5 mg, 5 mg, Oral, Daily, Sarina Edmondson MD    aspirin chewable tablet 81 mg, 81 mg, Oral, Daily, Sarina Edmondson MD, 81 mg at 02/06/18 0856    docusate sodium (COLACE) capsule 100 mg, 100 mg, Oral, BID, Sarina Edmondson MD, 100 mg at 02/06/18 1704    enoxaparin (LOVENOX) subcutaneous injection 40 mg, 40 mg, Subcutaneous, Q24H [address], Sarina Edmondson MD, 40 mg at 02/06/18 0856    lactated ringers infusion, 100 mL/hr, Intravenous, Continuous, Sarina Edmondson MD, Last Rate: 100 mL/hr at 02/07/18 0716, 100 mL/hr at 02/07/18 0716    lisinopril (ZESTRIL) tablet 10 mg, 10 mg, Oral, Daily, Sarina Edmondson MD    morphine injection 2 mg, 2 mg, Intravenous, Q2H PRN, Sarina Edmondson MD    ondansetron [facility]) injection 4 mg, 4 mg, Intravenous, Q6H PRN, Sarina Edmondson MD    oxyCODONE-acetaminophen (PERCOCET) 5-325 mg per tablet 1 tablet, 1 tablet, Oral, Q4H PRN, Sarina Edmondson MD, 1 tablet at 02/05/18 2157    oxyCODONE-acetaminophen (PERCOCET) 5-325 mg per tablet 2 tablet, 2 tablet, Oral, Q4H PRN, Sarina Edmondson MD    Blood Culture:   No results found for: BLOODCX    Wound Culture:   No results found for: WOUNDCULT    Ins and Outs:  I/O last 24 hours:   In: 2666 7 [P O :540; I V :2126 7]  Out: 1500 [Urine:1500]          Physical exam:  Vitals:    02/07/18 0654   BP: 100/56   Pulse: 75   Resp: 18   Temp: 97 5 °F (36 4 °C)   SpO2: 97%     left lower extremity  · Dressing clean dry intact  · Sensation intact L1-S1  · Motor intact to knee flexion/extension, EHL/FHL  · 2+ dorsalis pedis pulse    _*_*_*_*_*_*_*_*_*_*_*_*_*_*_*_*_*_*_*_*_*_*_*_*_*_*_*_*_*_*_*_*_*_*_*_*_*_*_*_*_*    Assessment: 68 y o male post operative day 2 left femur IMN  Pt doing well    Plan:  · Up and out of bed  · Weightbearing as tolerated  · PT/OT  · DVT prophylaxis  · Analgesics  · Dispo: Ortho will follow  · Acute blood loss anemia but asymptomatic no need for transfusion at this time    · Patient does have constipation will order MiraLax    Laura Escoto DO

## 2018-02-07 NOTE — PLAN OF CARE
Problem: DISCHARGE PLANNING - CARE MANAGEMENT  Goal: Discharge to post-acute care or home with appropriate resources  INTERVENTIONS:  - Conduct assessment to determine patient/family and health care team treatment goals, and need for post-acute services based on payer coverage, community resources, and patient preferences, and barriers to discharge  - Address psychosocial, clinical, and financial barriers to discharge as identified in assessment in conjunction with the patient/family and health care team  - Arrange appropriate level of post-acute services according to patients   needs and preference and payer coverage in collaboration with the physician and health care team  - Communicate with and update the patient/family, physician, and health care team regarding progress on the discharge plan  - Arrange appropriate transportation to post-acute venues   Outcome: Progressing  CM met with patient again today about SNF options patient has not decided what he wants to do  CM asked about calling his wife at which time was informed that patient believe rehab is a money scam  Patient wife and children are on board with him going to short term rehab  Cm went back to discuss information with patient at which time he ask for a few minutes to review the list  Cm will follow up again today before lunch  Cm department will continue to follow

## 2018-02-07 NOTE — SOCIAL WORK
CM met with patient again today about SNF options patient has not decided what he wants to do  CM asked about calling his wife at which time was informed that patient believe rehab is a money scam  Patient wife and children are on board with him going to short term rehab  Cm went back to discuss information with patient at which time he ask for a few minutes to review the list  Cm will follow up again today before lunch  Cm department will continue to follow

## 2018-02-07 NOTE — PLAN OF CARE
Problem: DISCHARGE PLANNING - CARE MANAGEMENT  Goal: Discharge to post-acute care or home with appropriate resources  INTERVENTIONS:  - Conduct assessment to determine patient/family and health care team treatment goals, and need for post-acute services based on payer coverage, community resources, and patient preferences, and barriers to discharge  - Address psychosocial, clinical, and financial barriers to discharge as identified in assessment in conjunction with the patient/family and health care team  - Arrange appropriate level of post-acute services according to patients   needs and preference and payer coverage in collaboration with the physician and health care team  - Communicate with and update the patient/family, physician, and health care team regarding progress on the discharge plan  - Arrange appropriate transportation to post-acute venues   Outcome: Progressing  Patient is [facility] [employer] can at 5:30pm  Patient wanted to talk with mary from ortho before leaving  Patient and spouse agreeable to transfer  Patient will obtain clothing tomorrow from his spouse  [employer] informed of transport  IMM reviewed with patient and family  Cost discussed with patient and family  No other needs at this time  Cm department will continue follow pt

## 2018-02-08 NOTE — DISCHARGE SUMMARY
Discharge Summary - Orthopedics   Nav Cesar 70 y o  male MRN: [id number]  Unit/Bed#:     Attending Physician:  Dr Tuan Márquez    Admitting diagnosis: Hip injury [S79 919A]  Closed fracture of left hip, initial encounter [facility]) Jermaine Choi    Discharge diagnosis: Hip injury [S79 919A]  Closed fracture of left hip, initial encounter [facility]) Jermaine Choi    Date of admission: 2/4/2018    Date of discharge: 2/7/2018    Procedure:  Intramedullary nail fixation left hip fracture 2/5/18 by Dr Jesús Jacobs course:  70 y o  male who sustained a mechanical fall after tripping on ice on 2/4/18  After the fall he noted severe left hip pain and inability to bear weight on the left lower extremity  He reported to the emergency department where x-rays revealed a left hip intertrochanteric fracture  He was admitted to the orthopedic service  Medical clearance was obtained  On 2/5/18 he underwent intramedullary nail fixation of the left hip fracture by Dr Tuan Márquez  He was transferred to the floor in stable condition  On the floor he received adequate pain control, PT/OT, DVT prophylaxis with Lovenox  The remainder of his hospital course wasunremarkable  He remained hemodynamically stable throughout his hospital course  He was discharged in stable condition on 2/7/18    Discharge Instructions:   · Weightbearing as tolerated left lower extremity  · Keep dressings clean and dry at all times   · Complete DVT prophylaxis as prescribed   · Physical therapy  · Follow-up as scheduled, otherwise call for appt  Discharge Medications: For the complete list of discharge medications, please refer to the patient's medication reconciliation

## 2018-02-15 ENCOUNTER — TELEPHONE (OUTPATIENT)
Dept: OBGYN CLINIC | Facility: HOSPITAL | Age: 68
End: 2018-02-15

## 2018-02-15 NOTE — TELEPHONE ENCOUNTER
[facility]  She says this pt has an order for lovenox for 30 days but he is stating that he doesn't want it  She is asking for permission to discontinue it   She can be reached at [phone]

## 2018-02-20 ENCOUNTER — APPOINTMENT (OUTPATIENT)
Dept: RADIOLOGY | Facility: CLINIC | Age: 68
End: 2018-02-20
Payer: MEDICARE

## 2018-02-20 ENCOUNTER — OFFICE VISIT (OUTPATIENT)
Dept: OBGYN CLINIC | Facility: CLINIC | Age: 68
End: 2018-02-20

## 2018-02-20 VITALS — DIASTOLIC BLOOD PRESSURE: 77 MMHG | SYSTOLIC BLOOD PRESSURE: 160 MMHG | HEART RATE: 85 BPM

## 2018-02-20 DIAGNOSIS — S72.145A CLOSED NONDISPLACED INTERTROCHANTERIC FRACTURE OF LEFT FEMUR, INITIAL ENCOUNTER (HCC): Primary | ICD-10-CM

## 2018-02-20 DIAGNOSIS — M25.552 PAIN IN LEFT HIP: ICD-10-CM

## 2018-02-20 PROBLEM — S72.142A INTERTROCHANTERIC FRACTURE OF LEFT FEMUR (HCC): Status: ACTIVE | Noted: 2018-02-20

## 2018-02-20 PROCEDURE — 99024 POSTOP FOLLOW-UP VISIT: CPT | Performed by: ORTHOPAEDIC SURGERY

## 2018-02-20 PROCEDURE — 73502 X-RAY EXAM HIP UNI 2-3 VIEWS: CPT

## 2018-02-20 NOTE — PROGRESS NOTES
Patient Name:  Crystal Lennox  MRN:  [id number]    Assessment & Plan    Left hip IM nail for IT fracture 2/5/18  1  Weightbearing as tolerated left lower extremity  2  Continue Tylenol as needed  3  Complete course of Lovenox  4  Follow up in four weeks for repeat evaluation with repeat x-rays of left hip  Subjective    71-year-old male returns to the office today for follow-up regarding his Left hip IM nail for IT fracture 2/5/18  Today he denies any pain  He notes mild soreness about the left hip  He takes Tylenol on occasion for pain  He denies incisional erythema or drainage  He is ambulating with a cane  He was discharged to a rehab facility after the hospital but was recently discharged home  He states he is not doing any physical therapy and states he does not need it  No numbness or tingling  No fevers or chills  Objective    /77   Pulse 85     Left hip:  Incisions well healed without erythema or drainage  Staples removed  Steri-Strips applied  Hip range of motion is intact without discomfort  Negative logroll test   Motor and sensation intact left lower extremity  2+ DP pulse  Data Review    I have personally reviewed pertinent films in PACS, and my interpretation follows  X-rays performed today of the left hip reveals stable and intact orthopedic hardware in satisfactory alignment

## 2018-03-20 ENCOUNTER — OFFICE VISIT (OUTPATIENT)
Dept: OBGYN CLINIC | Facility: CLINIC | Age: 68
End: 2018-03-20

## 2018-03-20 ENCOUNTER — APPOINTMENT (OUTPATIENT)
Dept: RADIOLOGY | Facility: CLINIC | Age: 68
End: 2018-03-20
Payer: MEDICARE

## 2018-03-20 VITALS
BODY MASS INDEX: 25.71 KG/M2 | SYSTOLIC BLOOD PRESSURE: 165 MMHG | HEIGHT: 66 IN | WEIGHT: 160 LBS | HEART RATE: 89 BPM | DIASTOLIC BLOOD PRESSURE: 90 MMHG

## 2018-03-20 DIAGNOSIS — S72.145D CLOSED NONDISPLACED INTERTROCHANTERIC FRACTURE OF LEFT FEMUR WITH ROUTINE HEALING, SUBSEQUENT ENCOUNTER: Primary | ICD-10-CM

## 2018-03-20 DIAGNOSIS — M25.552 PAIN OF LEFT HIP JOINT: ICD-10-CM

## 2018-03-20 PROCEDURE — 73502 X-RAY EXAM HIP UNI 2-3 VIEWS: CPT

## 2018-03-20 PROCEDURE — 99024 POSTOP FOLLOW-UP VISIT: CPT | Performed by: ORTHOPAEDIC SURGERY

## 2018-05-01 ENCOUNTER — APPOINTMENT (OUTPATIENT)
Dept: RADIOLOGY | Facility: CLINIC | Age: 68
End: 2018-05-01
Payer: MEDICARE

## 2018-05-01 ENCOUNTER — OFFICE VISIT (OUTPATIENT)
Dept: OBGYN CLINIC | Facility: CLINIC | Age: 68
End: 2018-05-01

## 2018-05-01 VITALS
WEIGHT: 151 LBS | HEART RATE: 78 BPM | SYSTOLIC BLOOD PRESSURE: 172 MMHG | HEIGHT: 66 IN | DIASTOLIC BLOOD PRESSURE: 91 MMHG | BODY MASS INDEX: 24.27 KG/M2

## 2018-05-01 DIAGNOSIS — S72.145D CLOSED NONDISPLACED INTERTROCHANTERIC FRACTURE OF LEFT FEMUR WITH ROUTINE HEALING, SUBSEQUENT ENCOUNTER: ICD-10-CM

## 2018-05-01 DIAGNOSIS — S72.145D CLOSED NONDISPLACED INTERTROCHANTERIC FRACTURE OF LEFT FEMUR WITH ROUTINE HEALING, SUBSEQUENT ENCOUNTER: Primary | ICD-10-CM

## 2018-05-01 PROCEDURE — 99024 POSTOP FOLLOW-UP VISIT: CPT | Performed by: ORTHOPAEDIC SURGERY

## 2018-05-01 PROCEDURE — 73502 X-RAY EXAM HIP UNI 2-3 VIEWS: CPT

## 2018-05-01 NOTE — PROGRESS NOTES
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  He denies weakness or instability  No numbness or tingling  No fevers or chills  He is happy with his result  Objective    BP (!) 172/91   Pulse 78   Ht 5' 6" (1 676 m)   Wt 68 5 kg (151 lb)   BMI 24 37 kg/m²     Left hip:  No gross deformity  No tenderness to palpation greater trochanter and anterior hip  Hip range of motion is intact without discomfort  Appropriate hip flexion and abduction strength  Negative logroll test   Sensation intact left lower extremity  Data Review    I have personally reviewed pertinent films in PACS, and my interpretation follows  X-rays performed today of the left hip reveals stable and intact orthopedic hardware in satisfactory alignment  Significant healing is noted        Scribe Attestation    I,:   Kirill Castillo PA-C am acting as a scribe while in the presence of the attending physician :        I,:   Cheryle Chaudhry MD personally performed the services described in this documentation    as scribed in my presence :

## 2018-08-21 DIAGNOSIS — C61 PROSTATE CANCER (HCC): Primary | ICD-10-CM

## 2018-08-27 DIAGNOSIS — C61 PROSTATE CANCER (HCC): Primary | ICD-10-CM

## 2018-08-31 ENCOUNTER — OFFICE VISIT (OUTPATIENT)
Dept: UROLOGY | Facility: CLINIC | Age: 68
End: 2018-08-31
Payer: MEDICARE

## 2018-08-31 VITALS
HEART RATE: 77 BPM | DIASTOLIC BLOOD PRESSURE: 90 MMHG | HEIGHT: 67 IN | BODY MASS INDEX: 24.01 KG/M2 | WEIGHT: 153 LBS | SYSTOLIC BLOOD PRESSURE: 150 MMHG

## 2018-08-31 DIAGNOSIS — C61 PROSTATE CANCER (HCC): Primary | ICD-10-CM

## 2018-08-31 LAB
SL AMB  POCT GLUCOSE, UA: NORMAL
SL AMB LEUKOCYTE ESTERASE,UA: NORMAL
SL AMB POCT BILIRUBIN,UA: NORMAL
SL AMB POCT BLOOD,UA: NORMAL
SL AMB POCT CLARITY,UA: CLEAR
SL AMB POCT COLOR,UA: YELLOW
SL AMB POCT KETONES,UA: NORMAL
SL AMB POCT NITRITE,UA: NORMAL
SL AMB POCT PH,UA: 5
SL AMB POCT SPECIFIC GRAVITY,UA: 1.01
SL AMB POCT URINE PROTEIN: NORMAL
SL AMB POCT UROBILINOGEN: NORMAL

## 2018-08-31 PROCEDURE — 81002 URINALYSIS NONAUTO W/O SCOPE: CPT | Performed by: PHYSICIAN ASSISTANT

## 2018-08-31 PROCEDURE — 99213 OFFICE O/P EST LOW 20 MIN: CPT | Performed by: PHYSICIAN ASSISTANT

## 2018-08-31 RX ORDER — MELATONIN
1000 DAILY
COMMUNITY

## 2018-08-31 NOTE — PROGRESS NOTES
UROLOGY PROGRESS NOTE   Patient Identifiers: Kady Bailey (MRN [id number])  Date of Service: 8/31/2018    Subjective:     69-year-old male with a history of [place] 7 stage T3b prostate cancer  He had Екатерина prostatectomy in 2011 followed by external beam radiation and 16 months of androgen deprivation therapy  His PSA remains undetectable <0 003  Urine is clear and he has good urinary control and no other complaints  He did break his left hip in February which was repaired and does not seem to have slowed him down one  bit  Patient has  no complaints  Objective:     VITALS:    Vitals:    08/31/18 1051   BP: 150/90   Pulse: 77     AUA SYMPTOM SCORE      Most Recent Value   AUA SYMPTOM SCORE   How often have you had a sensation of not emptying your bladder completely after you finished urinating? 1   How often have you had to urinate again less than two hours after you finished urinating? 1   How often have you found you stopped and started again several times when you urinate? 1   How often have you found it difficult to postpone urination? 0   How often have you had a weak urinary stream?  0   How often have you had to push or strain to begin urination? 0   How many times did you most typically get up to urinate from the time you went to bed at night until the time you got up in the morning?   1   Quality of Life: If you were to spend the rest of your life with your urinary condition just the way it is now, how would you feel about that?  0   AUA SYMPTOM SCORE  4            LABS:  Lab Results   Component Value Date    HGB 9 4 (L) 02/06/2018    HCT 29 6 (L) 02/06/2018    WBC 14 80 (H) 02/06/2018     02/06/2018   ]    Lab Results   Component Value Date     02/06/2018    K 4 2 02/06/2018     02/06/2018    CO2 29 02/06/2018    BUN 24 02/06/2018    CREATININE 1 18 02/06/2018    CALCIUM 7 8 (L) 02/06/2018   ]        INPATIENT MEDS:    Current Outpatient Prescriptions:     amLODIPine (NORVASC) 2 5 mg tablet, Take 2 5 mg by mouth daily  , Disp: , Rfl:     aspirin 81 MG tablet, Take 81 mg by mouth 4 (four) times a week  , Disp: , Rfl:     cholecalciferol (VITAMIN D3) 1,000 units tablet, Take 1,000 Units by mouth daily, Disp: , Rfl:     lisinopril (ZESTRIL) 10 mg tablet, Take 10 mg by mouth daily, Disp: , Rfl:       Physical Exam:   /90 (Patient Position: Sitting, Cuff Size: Adult)   Pulse 77   Ht 5' 6 5" (1 689 m)   Wt 69 4 kg (153 lb)   BMI 24 32 kg/m²   GEN: no acute distress    RESP: breathing comfortably with no accessory muscle use    ABD: soft, non-tender, non-distended   INCISION:    EXT: no significant peripheral edema   (Male): Penis circumcised, phallus normal, meatus patent  Testicles descended into scrotum bilaterally without masses nor tenderness  No inguinal hernias bilaterally  KRISTEN: Prostate is   Surgically absent with no rectal masses    RADIOLOGY:    none     Assessment:    1  Prostate cancer     Plan:   - follow-up 1 year with PSA prior to visit    -  -  -

## 2019-09-04 ENCOUNTER — OFFICE VISIT (OUTPATIENT)
Dept: UROLOGY | Facility: CLINIC | Age: 69
End: 2019-09-04
Payer: COMMERCIAL

## 2019-09-04 VITALS
HEIGHT: 65 IN | WEIGHT: 151.8 LBS | HEART RATE: 84 BPM | BODY MASS INDEX: 25.29 KG/M2 | DIASTOLIC BLOOD PRESSURE: 76 MMHG | SYSTOLIC BLOOD PRESSURE: 124 MMHG

## 2019-09-04 DIAGNOSIS — C61 PROSTATE CANCER (HCC): Primary | ICD-10-CM

## 2019-09-04 PROCEDURE — 99213 OFFICE O/P EST LOW 20 MIN: CPT | Performed by: PHYSICIAN ASSISTANT

## 2019-09-04 NOTE — PROGRESS NOTES
UROLOGY PROGRESS NOTE   Patient Identifiers: Jacinta Tabor (MRN [id number])  Date of Service: 9/4/2019    Subjective:     50-year-old man history of [place] 7 stage T3b prostate cancer  He had a robotic prostatectomy in 2011 followed by external beam radiation and 16 months of ADT  His PSA remains undetectable<0 003  He has good urinary control and no other complaints  Patient has  no complaints  Objective:     VITALS:    Vitals:    09/04/19 1033   BP: 124/76   Pulse: 84     AUA SYMPTOM SCORE      Most Recent Value   AUA SYMPTOM SCORE   How often have you had a sensation of not emptying your bladder completely after you finished urinating? 0   How often have you had to urinate again less than two hours after you finished urinating? 1   How often have you found you stopped and started again several times when you urinate?  0   How often have you found it difficult to postpone urination? 0   How often have you had a weak urinary stream?  1   How often have you had to push or strain to begin urination? 0   How many times did you most typically get up to urinate from the time you went to bed at night until the time you got up in the morning?   1   Quality of Life: If you were to spend the rest of your life with your urinary condition just the way it is now, how would you feel about that?  1   AUA SYMPTOM SCORE  3            LABS:  Lab Results   Component Value Date    HGB 9 4 (L) 02/06/2018    HCT 29 6 (L) 02/06/2018    WBC 14 80 (H) 02/06/2018     02/06/2018   ]    Lab Results   Component Value Date    K 4 2 02/06/2018     02/06/2018    CO2 29 02/06/2018    BUN 24 02/06/2018    CREATININE 1 18 02/06/2018    CALCIUM 7 8 (L) 02/06/2018   ]        INPATIENT MEDS:    Current Outpatient Medications:     amLODIPine (NORVASC) 2 5 mg tablet, Take 2 5 mg by mouth daily  , Disp: , Rfl:     cholecalciferol (VITAMIN D3) 1,000 units tablet, Take 1,000 Units by mouth daily, Disp: , Rfl:     lisinopril (ZESTRIL) 10 mg tablet, Take 10 mg by mouth daily, Disp: , Rfl:     aspirin 81 MG tablet, Take 81 mg by mouth 4 (four) times a week  , Disp: , Rfl:       Physical Exam:   /76   Pulse 84   Ht 5' 5" (1 651 m)   Wt 68 9 kg (151 lb 12 8 oz)   BMI 25 26 kg/m²   GEN: no acute distress    RESP: breathing comfortably with no accessory muscle use    ABD: soft, non-tender, non-distended   INCISION:    EXT: no significant peripheral edema   (Male): Penis circumcised, phallus normal, meatus patent  Testicles descended into scrotum bilaterally without masses nor tenderness  No inguinal hernias bilaterally  KRISTEN: Prostate is   Surgically absent with no rectal masses      RADIOLOGY:    none     Assessment:    1   Prostate cancer     Plan:   - follow-up in 1 year with PSA prior to visit  -  -  -

## 2020-08-10 DIAGNOSIS — I10 ESSENTIAL HYPERTENSION: Primary | Chronic | ICD-10-CM

## 2020-08-10 RX ORDER — LISINOPRIL 10 MG/1
10 TABLET ORAL DAILY
Qty: 90 TABLET | Refills: 0 | Status: SHIPPED | OUTPATIENT
Start: 2020-08-10 | End: 2020-11-23

## 2020-09-17 ENCOUNTER — TELEPHONE (OUTPATIENT)
Dept: UROLOGY | Facility: CLINIC | Age: 70
End: 2020-09-17

## 2020-11-23 DIAGNOSIS — I10 ESSENTIAL HYPERTENSION: Chronic | ICD-10-CM

## 2020-11-23 RX ORDER — LISINOPRIL 10 MG/1
TABLET ORAL
Qty: 90 TABLET | Refills: 0 | Status: SHIPPED | OUTPATIENT
Start: 2020-11-23 | End: 2021-04-27 | Stop reason: SDUPTHER

## 2021-04-27 DIAGNOSIS — I10 ESSENTIAL HYPERTENSION: Chronic | ICD-10-CM

## 2021-04-27 RX ORDER — LISINOPRIL 10 MG/1
10 TABLET ORAL DAILY
Qty: 30 TABLET | Refills: 0 | Status: SHIPPED | OUTPATIENT
Start: 2021-04-27 | End: 2021-05-17 | Stop reason: SDUPTHER

## 2021-04-27 NOTE — TELEPHONE ENCOUNTER
Patient requests refill on lisinopril 10 mg  Scheduled appointment for may 19   Please fill until he is seen, thank you

## 2021-05-17 DIAGNOSIS — I10 ESSENTIAL HYPERTENSION: Chronic | ICD-10-CM

## 2021-05-17 RX ORDER — LISINOPRIL 10 MG/1
10 TABLET ORAL DAILY
Qty: 30 TABLET | Refills: 1 | Status: SHIPPED | OUTPATIENT
Start: 2021-05-17 | End: 2021-07-02 | Stop reason: SDUPTHER

## 2021-07-02 ENCOUNTER — OFFICE VISIT (OUTPATIENT)
Dept: FAMILY MEDICINE CLINIC | Facility: CLINIC | Age: 71
End: 2021-07-02
Payer: COMMERCIAL

## 2021-07-02 VITALS
HEART RATE: 72 BPM | BODY MASS INDEX: 25.71 KG/M2 | RESPIRATION RATE: 16 BRPM | OXYGEN SATURATION: 94 % | TEMPERATURE: 98.9 F | WEIGHT: 160 LBS | HEIGHT: 66 IN | SYSTOLIC BLOOD PRESSURE: 150 MMHG | DIASTOLIC BLOOD PRESSURE: 84 MMHG

## 2021-07-02 DIAGNOSIS — I10 ESSENTIAL HYPERTENSION: Chronic | ICD-10-CM

## 2021-07-02 DIAGNOSIS — Z23 ENCOUNTER FOR IMMUNIZATION: ICD-10-CM

## 2021-07-02 DIAGNOSIS — Z12.11 SCREENING FOR COLORECTAL CANCER: ICD-10-CM

## 2021-07-02 DIAGNOSIS — Z00.00 WELL ADULT EXAM: Primary | ICD-10-CM

## 2021-07-02 DIAGNOSIS — Z12.12 SCREENING FOR COLORECTAL CANCER: ICD-10-CM

## 2021-07-02 DIAGNOSIS — Z72.0 TOBACCO ABUSE: Chronic | ICD-10-CM

## 2021-07-02 DIAGNOSIS — C61 PROSTATE CANCER (HCC): ICD-10-CM

## 2021-07-02 DIAGNOSIS — Z11.59 NEED FOR HEPATITIS C SCREENING TEST: ICD-10-CM

## 2021-07-02 PROCEDURE — 1125F AMNT PAIN NOTED PAIN PRSNT: CPT | Performed by: FAMILY MEDICINE

## 2021-07-02 PROCEDURE — 99214 OFFICE O/P EST MOD 30 MIN: CPT | Performed by: FAMILY MEDICINE

## 2021-07-02 PROCEDURE — 3008F BODY MASS INDEX DOCD: CPT | Performed by: FAMILY MEDICINE

## 2021-07-02 PROCEDURE — G0439 PPPS, SUBSEQ VISIT: HCPCS | Performed by: FAMILY MEDICINE

## 2021-07-02 PROCEDURE — 3288F FALL RISK ASSESSMENT DOCD: CPT | Performed by: FAMILY MEDICINE

## 2021-07-02 PROCEDURE — 1170F FXNL STATUS ASSESSED: CPT | Performed by: FAMILY MEDICINE

## 2021-07-02 PROCEDURE — 1101F PT FALLS ASSESS-DOCD LE1/YR: CPT | Performed by: FAMILY MEDICINE

## 2021-07-02 PROCEDURE — 3725F SCREEN DEPRESSION PERFORMED: CPT | Performed by: FAMILY MEDICINE

## 2021-07-02 PROCEDURE — 1160F RVW MEDS BY RX/DR IN RCRD: CPT | Performed by: FAMILY MEDICINE

## 2021-07-02 PROCEDURE — 4004F PT TOBACCO SCREEN RCVD TLK: CPT | Performed by: FAMILY MEDICINE

## 2021-07-02 RX ORDER — LISINOPRIL 20 MG/1
20 TABLET ORAL DAILY
Qty: 90 TABLET | Refills: 1 | Status: SHIPPED | OUTPATIENT
Start: 2021-07-02 | End: 2021-08-11 | Stop reason: SDUPTHER

## 2021-07-02 NOTE — PROGRESS NOTES
BMI Counseling: Body mass index is 25 82 kg/m²  The BMI is above normal  Nutrition recommendations include decreasing portion sizes, encouraging healthy choices of fruits and vegetables, decreasing fast food intake, consuming healthier snacks, limiting drinks that contain sugar, moderation in carbohydrate intake, increasing intake of lean protein, reducing intake of saturated and trans fat and reducing intake of cholesterol  Exercise recommendations include exercising 3-5 times per week  No pharmacotherapy was ordered  Patient referred to PCP due to patient being overweight  Tobacco Cessation Counseling: Tobacco cessation counseling was provided  The patient is sincerely urged to quit consumption of tobacco  He is not ready to quit tobacco  Medication options and side effects of medication not discussed  Patient agreed to medication  Assessment/Plan:         Problem List Items Addressed This Visit        Cardiovascular and Mediastinum    Essential hypertension (Chronic)     Patient is stable with current anti-hypertensive medicine and continue to follow a low sodium diet and take current medication  All questions about this condition were answered today  Genitourinary    Prostate cancer [facility])     Patient is stable  and will continue present plan of care and reassess at next routine visit  All questions about this problem from patient were answered today  Other    Tobacco abuse (Chronic)     Patient encouraged to quit using tobacco for that increases their risk for COPD, lung cancer,stroke, oral cancer and heart disease  If patient does not want to quit they should let me know  when they are interested in quitting  There are numerous options to use to quit and we can discuss them             Other Visit Diagnoses     Well adult exam    -  Primary    Need for hepatitis C screening test        Encounter for immunization        Screening for colorectal cancer                Subjective: Patient ID: Herbert Mota is a 66 y o  male  This is a 56-year-old white male here today for checkup on tobacco abuse prostate cancer history of femur fracture hypertension and is doing well  Patient was on Norvasc 2 5 milligrams and lisinopril 10 milligrams as stopped his Norvasc recently  Patient will need better control his blood pressure we will increase his lisinopril to 20 milligrams and stop the Norvasc all the her distal his 1 medicine for his blood pressure  He will come back in a month to see how he is doing with his Medicare wellness was done today  Pt refuses  [facility] screen today and not ready to smoke      The following portions of the patient's history were reviewed and updated as appropriate:   Past Medical History:  He has a past medical history of Cancer [facility]), Femur fracture, right ([address] ), Gastritis (10/11/2016), and Tobacco abuse (10/11/2016)  ,  _______________________________________________________________________  Medical Problems:  does not have any pertinent problems on file ,  _______________________________________________________________________  Past Surgical History:   has a past surgical history that includes Cholecystectomy; Prostatectomy (2011); Fracture surgery; and pr open rx femur fx+intramed massimo (Left, 2/5/2018)  ,  _______________________________________________________________________  Family History:  Family history is unknown by patient  ,  _______________________________________________________________________  Social History:   reports that he has been smoking cigarettes  He has a 100 00 pack-year smoking history  He has never used smokeless tobacco  He reports that he does not drink alcohol and does not use drugs  ,  _______________________________________________________________________  Allergies:  has No Known Allergies     _______________________________________________________________________  Current Outpatient Medications   Medication Sig Dispense Refill    amLODIPine (NORVASC) 2 5 mg tablet Take 2 5 mg by mouth daily        aspirin 81 MG tablet Take 81 mg by mouth 4 (four) times a week        cholecalciferol (VITAMIN D3) 1,000 units tablet Take 1,000 Units by mouth daily      lisinopril (ZESTRIL) 10 mg tablet Take 1 tablet (10 mg total) by mouth daily 30 tablet 1     No current facility-administered medications for this visit      _______________________________________________________________________  Review of Systems   Constitutional: Negative for activity change, appetite change, chills, fatigue, fever and unexpected weight change  HENT: Negative for congestion, ear pain, hearing loss, mouth sores, postnasal drip, sinus pressure, sinus pain, sneezing and sore throat  Respiratory: Negative for apnea, cough, shortness of breath and wheezing  Cardiovascular: Negative for chest pain, palpitations and leg swelling  Gastrointestinal: Negative for abdominal pain, constipation, diarrhea, nausea and vomiting  Endocrine: Negative for cold intolerance and heat intolerance  Genitourinary: Negative for dysuria, frequency and hematuria  Musculoskeletal: Negative for arthralgias, back pain, gait problem, joint swelling and neck pain  Skin: Negative for rash  Neurological: Negative for dizziness, weakness and numbness  Hematological: Does not bruise/bleed easily  Psychiatric/Behavioral: Negative for agitation, behavioral problems, confusion, hallucinations and sleep disturbance  The patient is not nervous/anxious  Objective: There were no vitals filed for this visit  There is no height or weight on file to calculate BMI  Physical Exam  Vitals and nursing note reviewed  Constitutional:       Appearance: He is well-developed  HENT:      Head: Normocephalic and atraumatic  Nose: Nose normal       Mouth/Throat:      Mouth: Mucous membranes are moist    Eyes:      General: No scleral icterus       Conjunctiva/sclera: Conjunctivae normal       Pupils: Pupils are equal, round, and reactive to light  Neck:      Thyroid: No thyromegaly  Cardiovascular:      Rate and Rhythm: Normal rate and regular rhythm  Heart sounds: Normal heart sounds  Pulmonary:      Effort: Pulmonary effort is normal  No respiratory distress  Breath sounds: Normal breath sounds  No wheezing  Abdominal:      General: Bowel sounds are normal       Palpations: Abdomen is soft  Tenderness: There is no abdominal tenderness  There is no guarding or rebound  Musculoskeletal:         General: Normal range of motion  Cervical back: Normal range of motion and neck supple  Skin:     General: Skin is warm and dry  Findings: No rash  Neurological:      Mental Status: He is alert and oriented to person, place, and time  Psychiatric:         Mood and Affect: Mood normal          Behavior: Behavior normal          Thought Content:  Thought content normal          Judgment: Judgment normal

## 2021-07-02 NOTE — PATIENT INSTRUCTIONS
Medicare Preventive Visit Patient Instructions  Thank you for completing your Welcome to Medicare Visit or Medicare Annual Wellness Visit today  Your next wellness visit will be due in one year (7/3/2022)  The screening/preventive services that you may require over the next 5-10 years are detailed below  Some tests may not apply to you based off risk factors and/or age  Screening tests ordered at today's visit but not completed yet may show as past due  Also, please note that scanned in results may not display below  Preventive Screenings:  Service Recommendations Previous Testing/Comments   Colorectal Cancer Screening  · Colonoscopy    · Fecal Occult Blood Test (FOBT)/Fecal Immunochemical Test (FIT)  · Fecal DNA/Cologuard Test  · Flexible Sigmoidoscopy Age: 54-65 years old   Colonoscopy: every 10 years (May be performed more frequently if at higher risk)  OR  FOBT/FIT: every 1 year  OR  Cologuard: every 3 years  OR  Sigmoidoscopy: every 5 years  Screening may be recommended earlier than age 48 if at higher risk for colorectal cancer  Also, an individualized decision between you and your healthcare provider will decide whether screening between the ages of 74-80 would be appropriate   Colonoscopy: Not on file  FOBT/FIT: Not on file  Cologuard: Not on file  Sigmoidoscopy: Not on file          Prostate Cancer Screening Individualized decision between patient and health care provider in men between ages of 53-78   Medicare will cover every 12 months beginning on the day after your 50th birthday PSA: No results in last 5 years     History Prostate Cancer     Hepatitis C Screening Once for adults born between [facility]  More frequently in patients at high risk for Hepatitis C Hep C Antibody: Not on file        Diabetes Screening 1-2 times per year if you're at risk for diabetes or have pre-diabetes Fasting glucose: No results in last 5 years   A1C: No results in last 5 years        Cholesterol Screening Once every 5 years if you don't have a lipid disorder  May order more often based on risk factors  Lipid panel: Not on file           Other Preventive Screenings Covered by Medicare:  1  Abdominal Aortic Aneurysm (AAA) Screening: covered once if your at risk  You're considered to be at risk if you have a family history of AAA or a male between the age of 73-68 who smoking at least 100 cigarettes in your lifetime  2  Lung Cancer Screening: covers low dose CT scan once per year if you meet all of the following conditions: (1) Age 50-69; (2) No signs or symptoms of lung cancer; (3) Current smoker or have quit smoking within the last 15 years; (4) You have a tobacco smoking history of at least 30 pack years (packs per day x number of years you smoked); (5) You get a written order from a healthcare provider  3  Glaucoma Screening: covered annually if you're considered high risk: (1) You have diabetes OR (2) Family history of glaucoma OR (3)  aged 48 and older OR (3)  American aged 72 and older  3  Osteoporosis Screening: covered every 2 years if you meet one of the following conditions: (1) Have a vertebral abnormality; (2) On glucocorticoid therapy for more than 3 months; (3) Have primary hyperparathyroidism; (4) On osteoporosis medications and need to assess response to drug therapy  5  HIV Screening: covered annually if you're between the age of 12-76  Also covered annually if you are younger than 13 and older than 72 with risk factors for HIV infection  For pregnant patients, it is covered up to 3 times per pregnancy      Immunizations:  Immunization Recommendations   Influenza Vaccine Annual influenza vaccination during flu season is recommended for all persons aged >= 6 months who do not have contraindications   Pneumococcal Vaccine (Prevnar and Pneumovax)  * Prevnar = PCV13  * Pneumovax = PPSV23 Adults 25-60 years old: 1-3 doses may be recommended based on certain risk factors  Adults 72 years old: Prevnar (PCV13) vaccine recommended followed by Pneumovax (PPSV23) vaccine  If already received PPSV23 since turning 65, then PCV13 recommended at least one year after PPSV23 dose  Hepatitis B Vaccine 3 dose series if at intermediate or high risk (ex: diabetes, end stage renal disease, liver disease)   Tetanus (Td) Vaccine - COST NOT COVERED BY MEDICARE PART B Following completion of primary series, a booster dose should be given every 10 years to maintain immunity against tetanus  Td may also be given as tetanus wound prophylaxis  Tdap Vaccine - COST NOT COVERED BY MEDICARE PART B Recommended at least once for all adults  For pregnant patients, recommended with each pregnancy  Shingles Vaccine (Shingrix) - COST NOT COVERED BY MEDICARE PART B  2 shot series recommended in those aged 48 and above     Health Maintenance Due:      Topic Date Due    Hepatitis C Screening  Never done    Lung Cancer Screening  Never done    Colorectal Cancer Screening  Never done     Immunizations Due:      Topic Date Due    Pneumococcal Vaccine: 65+ Years (1 of 2 - PPSV23) Never done    COVID-19 Vaccine (1) Never done    DTaP,Tdap,and Td Vaccines (1 - Tdap) Never done    Influenza Vaccine (1) 09/01/2021     Advance Directives   What are advance directives? Advance directives are legal documents that state your wishes and plans for medical care  These plans are made ahead of time in case you lose your ability to make decisions for yourself  Advance directives can apply to any medical decision, such as the treatments you want, and if you want to donate organs  What are the types of advance directives? There are many types of advance directives, and each state has rules about how to use them  You may choose a combination of any of the following:  · Living will: This is a written record of the treatment you want  You can also choose which treatments you do not want, which to limit, and which to stop at a certain time   This includes surgery, medicine, IV fluid, and tube feedings  · Durable power of  for healthcare [facility]): This is a written record that states who you want to make healthcare choices for you when you are unable to make them for yourself  This person, called a proxy, is usually a family member or a friend  You may choose more than 1 proxy  · Do not resuscitate (DNR) order:  A DNR order is used in case your heart stops beating or you stop breathing  It is a request not to have certain forms of treatment, such as CPR  A DNR order may be included in other types of advance directives  · Medical directive: This covers the care that you want if you are in a coma, near death, or unable to make decisions for yourself  You can list the treatments you want for each condition  Treatment may include pain medicine, surgery, blood transfusions, dialysis, IV or tube feedings, and a ventilator (breathing machine)  · Values history: This document has questions about your views, beliefs, and how you feel and think about life  This information can help others choose the care that you would choose  Why are advance directives important? An advance directive helps you control your care  Although spoken wishes may be used, it is better to have your wishes written down  Spoken wishes can be misunderstood, or not followed  Treatments may be given even if you do not want them  An advance directive may make it easier for your family to make difficult choices about your care  Cigarette Smoking and Your Health   Risks to your health if you smoke:  Nicotine and other chemicals found in tobacco damage every cell in your body  Even if you are a light smoker, you have an increased risk for cancer, heart disease, and lung disease  If you are pregnant or have diabetes, smoking increases your risk for complications     Benefits to your health if you stop smoking:   · You decrease respiratory symptoms such as coughing, wheezing, and shortness of breath  · You reduce your risk for cancers of the lung, mouth, throat, kidney, bladder, pancreas, stomach, and cervix  If you already have cancer, you increase the benefits of chemotherapy  You also reduce your risk for cancer returning or a second cancer from developing  · You reduce your risk for heart disease, blood clots, heart attack, and stroke  · You reduce your risk for lung infections, and diseases such as pneumonia, asthma, chronic bronchitis, and emphysema  · Your circulation improves  More oxygen can be delivered to your body  If you have diabetes, you lower your risk for complications, such as kidney, artery, and eye diseases  You also lower your risk for nerve damage  Nerve damage can lead to amputations, poor vision, and blindness  · You improve your body's ability to heal and to fight infections  For more information and support to stop smoking:   · [employer]  Phone: 3- 674 - 080-7131  Web Address: [employer]  Weight Management   Why it is important to manage your weight:  Being overweight increases your risk of health conditions such as heart disease, high blood pressure, type 2 diabetes, and certain types of cancer  It can also increase your risk for osteoarthritis, sleep apnea, and other respiratory problems  Aim for a slow, steady weight loss  Even a small amount of weight loss can lower your risk of health problems  How to lose weight safely:  A safe and healthy way to lose weight is to eat fewer calories and get regular exercise  You can lose up about 1 pound a week by decreasing the number of calories you eat by 500 calories each day  Healthy meal plan for weight management:  A healthy meal plan includes a variety of foods, contains fewer calories, and helps you stay healthy  A healthy meal plan includes the following:  · Eat whole-grain foods more often  A healthy meal plan should contain fiber   Fiber is the part of grains, fruits, and vegetables that is not broken down by your body  Whole-grain foods are healthy and provide extra fiber in your diet  Some examples of whole-grain foods are whole-wheat breads and pastas, oatmeal, brown rice, and bulgur  · Eat a variety of vegetables every day  Include dark, leafy greens such as spinach, kale, alex greens, and mustard greens  Eat yellow and orange vegetables such as carrots, sweet potatoes, and winter squash  · Eat a variety of fruits every day  Choose fresh or canned fruit (canned in its own juice or light syrup) instead of juice  Fruit juice has very little or no fiber  · Eat low-fat dairy foods  Drink fat-free (skim) milk or 1% milk  Eat fat-free yogurt and low-fat cottage cheese  Try low-fat cheeses such as mozzarella and other reduced-fat cheeses  · Choose meat and other protein foods that are low in fat  Choose beans or other legumes such as split peas or lentils  Choose fish, skinless poultry (chicken or turkey), or lean cuts of red meat (beef or pork)  Before you cook meat or poultry, cut off any visible fat  · Use less fat and oil  Try baking foods instead of frying them  Add less fat, such as margarine, sour cream, regular salad dressing and mayonnaise to foods  Eat fewer high-fat foods  Some examples of high-fat foods include french fries, doughnuts, ice cream, and cakes  · Eat fewer sweets  Limit foods and drinks that are high in sugar  This includes candy, cookies, regular soda, and sweetened drinks  Exercise:  Exercise at least 30 minutes per day on most days of the week  Some examples of exercise include walking, biking, dancing, and swimming  You can also fit in more physical activity by taking the stairs instead of the elevator or parking farther away from stores  Ask your healthcare provider about the best exercise plan for you  © Copyright [employer] 2018 Information is for End User's use only and may not be sold, redistributed or otherwise used for commercial purposes  All illustrations and images included in CareNotes® are the copyrighted property of [employer] Inc  or Marylou Villaseñor

## 2021-07-02 NOTE — PROGRESS NOTES
Assessment and Plan:     Problem List Items Addressed This Visit        Cardiovascular and Mediastinum    Essential hypertension (Chronic)     Patient is stable with current anti-hypertensive medicine and continue to follow a low sodium diet and take current medication  All questions about this condition were answered today  Genitourinary    Prostate cancer [facility])     Patient is stable  and will continue present plan of care and reassess at next routine visit  All questions about this problem from patient were answered today  Other    Tobacco abuse (Chronic)     Patient encouraged to quit using tobacco for that increases their risk for COPD, lung cancer,stroke, oral cancer and heart disease  If patient does not want to quit they should let me know  when they are interested in quitting  There are numerous options to use to quit and we can discuss them  Other Visit Diagnoses     Well adult exam    -  Primary    Need for hepatitis C screening test        Encounter for immunization        Screening for colorectal cancer               Preventive health issues were discussed with patient, and age appropriate screening tests were ordered as noted in patient's After Visit Summary  Personalized health advice and appropriate referrals for health education or preventive services given if needed, as noted in patient's After Visit Summary       History of Present Illness:     Patient presents for Medicare Annual Wellness visit    Patient Care Team:  Maryellen Lopez MD as PCP - General (Family Medicine)     Problem List:     Patient Active Problem List   Diagnosis    Gastritis    Tobacco abuse    Essential hypertension    Intertrochanteric fracture of left femur ([facility] [address] )    Prostate cancer [facility])      Past Medical and Surgical History:     Past Medical History:   Diagnosis Date    Cancer [facility])     PROSTATE    Femur fracture, right ([facility] [address] )     Gastritis 10/11/2016    Tobacco abuse 10/11/2016 Past Surgical History:   Procedure Laterality Date    CHOLECYSTECTOMY      FRACTURE SURGERY      [place] OPEN RX FEMUR FX+INTRAMED ELVA Left 2018    Procedure: LEFT HIP SHORT TROCHANTERIC FEMORAL NAIL;  Surgeon: Marlen Contreras MD;  [facility];  Service: Orthopedics    PROSTATECTOMY        Family History:     Family History   Family history unknown: Yes      Social History:     Social History     Socioeconomic History    Marital status: /Civil Union     Spouse name: None    Number of children: None    Years of education: None    Highest education level: None   Occupational History    None   Tobacco Use    Smoking status: Current Every Day Smoker     Packs/day: 1 00     Years: 50 00     Pack years: 50 00     Types: Cigarettes    Smokeless tobacco: Never Used    Tobacco comment: smokes pipe tabacco    Substance and Sexual Activity    Alcohol use: No    Drug use: No    Sexual activity: None   Other Topics Concern    None   Social History Narrative    · Most recent tobacco use screenin2019      · Do you currently or have you served in the [employer]:   No      · Were you activated, into active duty, as a member of the [employer] or as a Reservist:   No      · Sexual orientation:   Heterosexual      · Exercise level:   Occasional      · Diet:   Regular      · General stress level:   Medium      · Alcohol intake:   None      · Caffeine intake:   Occasional      · Guns present in home: Yes      · Seat belts used routinely:   Yes      · Sunscreen used routinely:   No      · Smoke alarm in home:   Yes      Social Determinants of Health     Financial Resource Strain:     Difficulty of Paying Living Expenses:    Food Insecurity:     Worried About Running Out of Food in the Last Year:     [address] in the Last Year:    Transportation Needs:     Lack of Transportation (Medical):      Lack of Transportation (Non-Medical):    Physical Activity:     Days of Exercise per Week:     Minutes of Exercise per Session:    Stress:     Feeling of Stress :    Social Connections:     Frequency of Communication with Friends and Family:     Frequency of Social Gatherings with Friends and Family:     Attends [employer] Services:     Active Member of Clubs or Organizations:     Attends Club or Organization Meetings:     Marital Status:    Intimate Partner Violence:     Fear of Current or Ex-Partner:     Emotionally Abused:     Physically Abused:     Sexually Abused:       Medications and Allergies:     Current Outpatient Medications   Medication Sig Dispense Refill    aspirin 81 MG tablet Take 81 mg by mouth 4 (four) times a week        cholecalciferol (VITAMIN D3) 1,000 units tablet Take 1,000 Units by mouth daily      lisinopril (ZESTRIL) 10 mg tablet Take 1 tablet (10 mg total) by mouth daily 30 tablet 1    amLODIPine (NORVASC) 2 5 mg tablet Take 2 5 mg by mouth daily   (Patient not taking: Reported on 7/2/2021)       No current facility-administered medications for this visit  No Known Allergies   Immunizations: There is no immunization history on file for this patient  Health Maintenance:         Topic Date Due    Hepatitis C Screening  Never done    Lung Cancer Screening  Never done    Colorectal Cancer Screening  Never done         Topic Date Due    Pneumococcal Vaccine: 65+ Years (1 of 2 - PPSV23) Never done    COVID-19 Vaccine (1) Never done    DTaP,Tdap,and Td Vaccines (1 - Tdap) Never done    Influenza Vaccine (1) 09/01/2021      Medicare Health Risk Assessment:     Resp 16   Ht 5' 6" (1 676 m)   Wt 72 6 kg (160 lb)   BMI 25 82 kg/m²      Nav is here for his Subsequent Wellness visit  Health Risk Assessment:   Patient rates overall health as good  Patient feels that their physical health rating is same  Patient is satisfied with their life  Eyesight was rated as same  Hearing was rated as same   Patient feels that their emotional and mental health rating is same  Patients states they are sometimes angry  Patient states they are never, rarely unusually tired/fatigued  Pain experienced in the last 7 days has been none  Patient states that he has experienced no weight loss or gain in last 6 months  Depression Screening:   PHQ-2 Score: 2      Fall Risk Screening: In the past year, patient has experienced: no history of falling in past year      Home Safety:  Patient does not have trouble with stairs inside or outside of their home  Patient has working smoke alarms and has working carbon monoxide detector  Home safety hazards include: none  Nutrition:   Current diet is Regular  Medications:   Patient is currently taking over-the-counter supplements  OTC medications include: see medication list  Patient is able to manage medications  Activities of Daily Living (ADLs)/Instrumental Activities of Daily Living (IADLs):   Walk and transfer into and out of bed and chair?: Yes  Dress and groom yourself?: Yes    Bathe or shower yourself?: Yes    Feed yourself?  Yes  Do your laundry/housekeeping?: Yes  Manage your money, pay your bills and track your expenses?: Yes  Make your own meals?: Yes    Do your own shopping?: Yes    Previous Hospitalizations:   Any hospitalizations or ED visits within the last 12 months?: No      Advance Care Planning:   Living will: No    Durable POA for healthcare: No    Advanced directive: No    Advanced directive counseling given: No    Five wishes given: No    Patient declined ACP directive: No    End of Life Decisions reviewed with patient: No    Provider agrees with end of life decisions: No      Cognitive Screening:   Provider or family/friend/caregiver concerned regarding cognition?: No    PREVENTIVE SCREENINGS        Prostate Cancer Screening:    General: History Prostate Cancer      Abdominal Aortic Aneurysm (AAA) Screening:    Risk factors include: age between 73-69 yo and tobacco use      Screening, Brief Intervention, and Referral to Treatment (SBIRT)    Screening  Typical number of drinks in a day: 0  Typical number of drinks in a week: 0  Interpretation: Low risk drinking behavior      Single Item Drug Screening:  How often have you used an illegal drug (including marijuana) or a prescription medication for non-medical reasons in the past year? never    Single Item Drug Screen Score: 0  Interpretation: Negative screen for possible drug use disorder      Patricio Rosenberg MD

## 2021-07-15 NOTE — PROGRESS NOTES
Patient Name:  Cassidy Deutsch  MRN:  [id number]    Assessment & Plan    Left hip short TFN 2/5/18  1  Weight-bearing as tolerated  2  Ambulate with assistive device as needed  3  Follow-up in 6 weeks with x-rays left hip  Subjective    Postop evaluation after left hip short TFN 2/5/18  Patient reports no left hip pain  He ambulates with a cane because he has been having increased pain in his left knee, which bother him occasionally before the hip injury  He takes ibuprofen occasionally for the knee  Objective    /90   Pulse 89   Ht 5' 6" (1 676 m)   Wt 72 6 kg (160 lb)   BMI 25 82 kg/m²     Left hip:  Nontender to palpation  Full range of motion without pain  5/5 strength hip flexion and hip abduction  Trace lower extremity edema  Mood and affect are appropriate  Data Review    I have personally reviewed pertinent films in PACS, and my interpretation follows  X-rays left hip 3/20/18:  Healing intertrochanteric hip fracture in stable alignment  Lab: -7

## 2021-08-05 LAB — HCV AB S/CO SERPL IA: <0.1 S/CO RATIO (ref 0–0.9)

## 2021-08-10 NOTE — PROGRESS NOTES
Falls Plan of Care: balance, strength, and gait training instructions were provided  Home safety education provided  Assessment/Plan:         Problem List Items Addressed This Visit        Cardiovascular and Mediastinum    Essential hypertension - Primary (Chronic)     Patient is stable with current anti-hypertensive medicine and continue to follow a low sodium diet and take current medication  All questions about this condition were answered today  Relevant Medications    lisinopril (ZESTRIL) 20 mg tablet       Genitourinary    Prostate cancer [facility])     Patient is stable  and will continue present plan of care and reassess at next routine visit  All questions about this problem from patient were answered today  Other    Tobacco abuse (Chronic)     Patient encouraged to quit using tobacco for that increases their risk for COPD, lung cancer,stroke, oral cancer and heart disease  If patient does not want to quit they should let me know  when they are interested in quitting  There are numerous options to use to quit and we can discuss them  Other Visit Diagnoses     Encounter for screening for lung cancer        Personal history of nicotine dependence        Screening for colorectal cancer        Relevant Orders    Ambulatory referral to Gastroenterology            Subjective:      Patient ID: Nav Mckeon is a 66 y o  male  This is a 71-year-old white male here today for checkup on hypertension tobacco abuse COPD and is doing well  Patient had with discussion with him about the COVID-19 he wants to think about the vaccine at this point  Patient blood pressure is still high will see about increasing his medicine to a higher dose and we check him back in about a month for blood pressure        The following portions of the patient's history were reviewed and updated as appropriate:   Past Medical History:  He has a past medical history of Cancer ([address] ), Femur fracture, right ([facility] ), Gastritis (10/11/2016), and Tobacco abuse (10/11/2016)  ,  _______________________________________________________________________  Medical Problems:  does not have any pertinent problems on file ,  _______________________________________________________________________  Past Surgical History:   has a past surgical history that includes Cholecystectomy; Prostatectomy (2011); Fracture surgery; and pr open rx femur fx+intramed massimo (Left, 2/5/2018)  ,  _______________________________________________________________________  Family History:  Family history is unknown by patient  ,  _______________________________________________________________________  Social History:   reports that he has been smoking cigarettes and pipe  He has a 50 00 pack-year smoking history  He has never used smokeless tobacco  He reports that he does not drink alcohol and does not use drugs  ,  _______________________________________________________________________  Allergies:  has No Known Allergies     _______________________________________________________________________  Current Outpatient Medications   Medication Sig Dispense Refill    aspirin 81 MG tablet Take 81 mg by mouth 4 (four) times a week        cholecalciferol (VITAMIN D3) 1,000 units tablet Take 1,000 Units by mouth daily      lisinopril (ZESTRIL) 20 mg tablet Take 2 tablets (40 mg total) by mouth daily 90 tablet 1     No current facility-administered medications for this visit      _______________________________________________________________________  Review of Systems   Constitutional: Negative for activity change, appetite change, chills, fatigue, fever and unexpected weight change  HENT: Negative for congestion, ear pain, hearing loss, mouth sores, postnasal drip, sinus pressure, sinus pain, sneezing and sore throat  Respiratory: Negative for apnea, cough, shortness of breath and wheezing  Cardiovascular: Negative for chest pain, palpitations and leg swelling  Gastrointestinal: Negative for abdominal pain, constipation, diarrhea, nausea and vomiting  Endocrine: Negative for cold intolerance and heat intolerance  Genitourinary: Negative for dysuria, frequency and hematuria  Musculoskeletal: Negative for arthralgias, back pain, gait problem, joint swelling and neck pain  Skin: Negative for rash  Neurological: Negative for dizziness, weakness and numbness  Hematological: Does not bruise/bleed easily  Psychiatric/Behavioral: Negative for agitation, behavioral problems, confusion, hallucinations and sleep disturbance  The patient is not nervous/anxious  Objective:  Vitals:    08/11/21 0933   BP: 158/80   BP Location: Left arm   Patient Position: Sitting   Cuff Size: Standard   Pulse: 71   Resp: 16   Temp: (!) 100 6 °F (38 1 °C)   SpO2: 93%   Weight: 70 3 kg (155 lb)   Height: 5' 6" (1 676 m)     Body mass index is 25 02 kg/m²  Physical Exam  Vitals and nursing note reviewed  Constitutional:       Appearance: He is well-developed  HENT:      Head: Normocephalic and atraumatic  Nose: Nose normal       Mouth/Throat:      Mouth: Mucous membranes are moist    Eyes:      General: No scleral icterus  Conjunctiva/sclera: Conjunctivae normal       Pupils: Pupils are equal, round, and reactive to light  Neck:      Thyroid: No thyromegaly  Cardiovascular:      Rate and Rhythm: Normal rate and regular rhythm  Heart sounds: Normal heart sounds  Pulmonary:      Effort: Pulmonary effort is normal  No respiratory distress  Breath sounds: Normal breath sounds  No wheezing  Abdominal:      General: Bowel sounds are normal       Palpations: Abdomen is soft  Tenderness: There is no abdominal tenderness  There is no guarding or rebound  Musculoskeletal:         General: Normal range of motion  Cervical back: Normal range of motion and neck supple  Skin:     General: Skin is warm and dry  Findings: No rash  Neurological:      Mental Status: He is alert and oriented to person, place, and time  Psychiatric:         Mood and Affect: Mood normal          Behavior: Behavior normal          Thought Content:  Thought content normal          Judgment: Judgment normal

## 2021-08-11 ENCOUNTER — OFFICE VISIT (OUTPATIENT)
Dept: FAMILY MEDICINE CLINIC | Facility: CLINIC | Age: 71
End: 2021-08-11
Payer: COMMERCIAL

## 2021-08-11 VITALS
HEART RATE: 71 BPM | WEIGHT: 155 LBS | DIASTOLIC BLOOD PRESSURE: 80 MMHG | TEMPERATURE: 100.6 F | RESPIRATION RATE: 16 BRPM | HEIGHT: 66 IN | BODY MASS INDEX: 24.91 KG/M2 | SYSTOLIC BLOOD PRESSURE: 158 MMHG | OXYGEN SATURATION: 93 %

## 2021-08-11 DIAGNOSIS — I10 ESSENTIAL HYPERTENSION: Primary | Chronic | ICD-10-CM

## 2021-08-11 DIAGNOSIS — Z12.11 SCREENING FOR COLORECTAL CANCER: ICD-10-CM

## 2021-08-11 DIAGNOSIS — Z12.2 ENCOUNTER FOR SCREENING FOR LUNG CANCER: ICD-10-CM

## 2021-08-11 DIAGNOSIS — C61 PROSTATE CANCER (HCC): ICD-10-CM

## 2021-08-11 DIAGNOSIS — Z87.891 PERSONAL HISTORY OF NICOTINE DEPENDENCE: ICD-10-CM

## 2021-08-11 DIAGNOSIS — Z72.0 TOBACCO ABUSE: Chronic | ICD-10-CM

## 2021-08-11 DIAGNOSIS — Z12.12 SCREENING FOR COLORECTAL CANCER: ICD-10-CM

## 2021-08-11 PROCEDURE — 1160F RVW MEDS BY RX/DR IN RCRD: CPT | Performed by: FAMILY MEDICINE

## 2021-08-11 PROCEDURE — 3077F SYST BP >= 140 MM HG: CPT | Performed by: FAMILY MEDICINE

## 2021-08-11 PROCEDURE — 99214 OFFICE O/P EST MOD 30 MIN: CPT | Performed by: FAMILY MEDICINE

## 2021-08-11 PROCEDURE — 4004F PT TOBACCO SCREEN RCVD TLK: CPT | Performed by: FAMILY MEDICINE

## 2021-08-11 PROCEDURE — 3008F BODY MASS INDEX DOCD: CPT | Performed by: FAMILY MEDICINE

## 2021-08-11 PROCEDURE — 3079F DIAST BP 80-89 MM HG: CPT | Performed by: FAMILY MEDICINE

## 2021-08-11 RX ORDER — LISINOPRIL 20 MG/1
40 TABLET ORAL DAILY
Qty: 90 TABLET | Refills: 1 | Status: SHIPPED | OUTPATIENT
Start: 2021-08-11 | End: 2021-11-08

## 2021-09-09 NOTE — PROGRESS NOTES
Falls Plan of Care: balance, strength, and gait training instructions were provided  Home safety education provided  Assessment/Plan:         Problem List Items Addressed This Visit        Cardiovascular and Mediastinum    Essential hypertension (Chronic)     Patient is stable with current anti-hypertensive medicine and continue to follow a low sodium diet and take current medication  All questions about this condition were answered today  Genitourinary    Prostate cancer [facility]) - Primary     Patient is stable  and will continue present plan of care and reassess at next routine visit  All questions about this problem from patient were answered today  Other    Tobacco abuse (Chronic)     Patient encouraged to quit using tobacco for that increases their risk for COPD, lung cancer,stroke, oral cancer and heart disease  If patient does not want to quit they should let me know  when they are interested in quitting  There are numerous options to use to quit and we can discuss them  Other Visit Diagnoses     Encounter for screening for lung cancer        Personal history of nicotine dependence        Screening for colorectal cancer                Subjective:      Patient ID: Ben Kiran is a 66 y o  male  HPI    The following portions of the patient's history were reviewed and updated as appropriate:   Past Medical History:  He has a past medical history of Cancer ([address] ), Essential hypertension (2/4/2018), Essential hypertension (2/4/2018), Femur fracture, right ([address] ), Gastritis (10/11/2016), and Tobacco abuse (10/11/2016)  ,  _______________________________________________________________________  Medical Problems:  does not have any pertinent problems on file ,  _______________________________________________________________________  Past Surgical History:   has a past surgical history that includes Cholecystectomy; Prostatectomy (2011);  Fracture surgery; and pr open rx femur fx+intramed massimo (Left, 2/5/2018)  ,  _______________________________________________________________________  Family History:  Family history is unknown by patient  ,  _______________________________________________________________________  Social History:   reports that he has been smoking cigarettes and pipe  He has a 50 00 pack-year smoking history  He has never used smokeless tobacco  He reports that he does not drink alcohol and does not use drugs  ,  _______________________________________________________________________  Allergies:  has No Known Allergies     _______________________________________________________________________  Current Outpatient Medications   Medication Sig Dispense Refill    aspirin 81 MG tablet Take 81 mg by mouth 4 (four) times a week        cholecalciferol (VITAMIN D3) 1,000 units tablet Take 1,000 Units by mouth daily      lisinopril (ZESTRIL) 20 mg tablet Take 2 tablets (40 mg total) by mouth daily 90 tablet 1     No current facility-administered medications for this visit      _______________________________________________________________________  Review of Systems   Constitutional: Negative for activity change, appetite change, chills, fatigue, fever and unexpected weight change  HENT: Negative for congestion, ear pain, hearing loss, mouth sores, postnasal drip, sinus pressure, sinus pain, sneezing and sore throat  Respiratory: Negative for apnea, cough, shortness of breath and wheezing  Cardiovascular: Negative for chest pain, palpitations and leg swelling  Gastrointestinal: Negative for abdominal pain, constipation, diarrhea, nausea and vomiting  Endocrine: Negative for cold intolerance and heat intolerance  Genitourinary: Negative for dysuria, frequency and hematuria  Musculoskeletal: Negative for arthralgias, back pain, gait problem, joint swelling and neck pain  Skin: Negative for rash  Neurological: Negative for dizziness, weakness and numbness  Hematological: Does not bruise/bleed easily  Psychiatric/Behavioral: Negative for agitation, behavioral problems, confusion, hallucinations and sleep disturbance  The patient is not nervous/anxious  Objective:  Vitals:    09/10/21 1538   BP: 148/78   BP Location: Left arm   Patient Position: Sitting   Cuff Size: Standard   Pulse: 76   Resp: 16   Temp: 97 9 °F (36 6 °C)   SpO2: 95%   Weight: 68 5 kg (151 lb)   Height: 5' 6" (1 676 m)     Body mass index is 24 37 kg/m²  Physical Exam  Vitals and nursing note reviewed  Constitutional:       Appearance: He is well-developed  HENT:      Head: Normocephalic and atraumatic  Nose: Nose normal    Eyes:      General: No scleral icterus  Conjunctiva/sclera: Conjunctivae normal       Pupils: Pupils are equal, round, and reactive to light  Neck:      Thyroid: No thyromegaly  Cardiovascular:      Rate and Rhythm: Normal rate and regular rhythm  Heart sounds: Normal heart sounds  Pulmonary:      Effort: Pulmonary effort is normal  No respiratory distress  Breath sounds: Normal breath sounds  No wheezing  Abdominal:      General: Bowel sounds are normal       Palpations: Abdomen is soft  Tenderness: There is no abdominal tenderness  There is no guarding or rebound  Musculoskeletal:         General: Normal range of motion  Cervical back: Normal range of motion and neck supple  Skin:     General: Skin is warm and dry  Findings: No rash  Neurological:      Mental Status: He is alert and oriented to person, place, and time  Psychiatric:         Mood and Affect: Mood normal          Behavior: Behavior normal          Thought Content:  Thought content normal          Judgment: Judgment normal

## 2021-09-10 ENCOUNTER — OFFICE VISIT (OUTPATIENT)
Dept: FAMILY MEDICINE CLINIC | Facility: CLINIC | Age: 71
End: 2021-09-10
Payer: COMMERCIAL

## 2021-09-10 VITALS
HEIGHT: 66 IN | SYSTOLIC BLOOD PRESSURE: 148 MMHG | TEMPERATURE: 97.9 F | OXYGEN SATURATION: 95 % | BODY MASS INDEX: 24.27 KG/M2 | RESPIRATION RATE: 16 BRPM | WEIGHT: 151 LBS | HEART RATE: 76 BPM | DIASTOLIC BLOOD PRESSURE: 78 MMHG

## 2021-09-10 DIAGNOSIS — Z12.12 SCREENING FOR COLORECTAL CANCER: ICD-10-CM

## 2021-09-10 DIAGNOSIS — I10 ESSENTIAL HYPERTENSION: ICD-10-CM

## 2021-09-10 DIAGNOSIS — Z87.891 PERSONAL HISTORY OF NICOTINE DEPENDENCE: ICD-10-CM

## 2021-09-10 DIAGNOSIS — C61 PROSTATE CANCER (HCC): Primary | ICD-10-CM

## 2021-09-10 DIAGNOSIS — Z12.2 ENCOUNTER FOR SCREENING FOR LUNG CANCER: ICD-10-CM

## 2021-09-10 DIAGNOSIS — Z72.0 TOBACCO ABUSE: Chronic | ICD-10-CM

## 2021-09-10 DIAGNOSIS — Z12.11 SCREENING FOR COLORECTAL CANCER: ICD-10-CM

## 2021-09-10 PROCEDURE — 4004F PT TOBACCO SCREEN RCVD TLK: CPT | Performed by: FAMILY MEDICINE

## 2021-09-10 PROCEDURE — 3008F BODY MASS INDEX DOCD: CPT | Performed by: FAMILY MEDICINE

## 2021-09-10 PROCEDURE — 3077F SYST BP >= 140 MM HG: CPT | Performed by: FAMILY MEDICINE

## 2021-09-10 PROCEDURE — 3078F DIAST BP <80 MM HG: CPT | Performed by: FAMILY MEDICINE

## 2021-09-10 PROCEDURE — 99214 OFFICE O/P EST MOD 30 MIN: CPT | Performed by: FAMILY MEDICINE

## 2021-09-10 PROCEDURE — 1160F RVW MEDS BY RX/DR IN RCRD: CPT | Performed by: FAMILY MEDICINE

## 2021-09-10 RX ORDER — LISINOPRIL 40 MG/1
40 TABLET ORAL DAILY
Qty: 90 TABLET | Refills: 1 | Status: SHIPPED | OUTPATIENT
Start: 2021-09-10 | End: 2021-12-10 | Stop reason: SDUPTHER

## 2021-11-07 DIAGNOSIS — I10 ESSENTIAL HYPERTENSION: Chronic | ICD-10-CM

## 2021-11-08 RX ORDER — LISINOPRIL 20 MG/1
TABLET ORAL
Qty: 90 TABLET | Refills: 1 | Status: SHIPPED | OUTPATIENT
Start: 2021-11-08 | End: 2021-12-10

## 2021-11-18 ENCOUNTER — VBI (OUTPATIENT)
Dept: ADMINISTRATIVE | Facility: OTHER | Age: 71
End: 2021-11-18

## 2021-12-06 ENCOUNTER — RA CDI HCC (OUTPATIENT)
Dept: OTHER | Facility: HOSPITAL | Age: 71
End: 2021-12-06

## 2021-12-10 ENCOUNTER — OFFICE VISIT (OUTPATIENT)
Dept: FAMILY MEDICINE CLINIC | Facility: CLINIC | Age: 71
End: 2021-12-10
Payer: COMMERCIAL

## 2021-12-10 VITALS
TEMPERATURE: 98.8 F | DIASTOLIC BLOOD PRESSURE: 80 MMHG | BODY MASS INDEX: 25.31 KG/M2 | WEIGHT: 157.5 LBS | OXYGEN SATURATION: 94 % | HEIGHT: 66 IN | HEART RATE: 64 BPM | SYSTOLIC BLOOD PRESSURE: 142 MMHG | RESPIRATION RATE: 18 BRPM

## 2021-12-10 DIAGNOSIS — Z12.11 SCREENING FOR COLORECTAL CANCER: ICD-10-CM

## 2021-12-10 DIAGNOSIS — Z12.12 SCREENING FOR COLORECTAL CANCER: ICD-10-CM

## 2021-12-10 DIAGNOSIS — I10 ESSENTIAL HYPERTENSION: Primary | Chronic | ICD-10-CM

## 2021-12-10 DIAGNOSIS — Z87.891 PERSONAL HISTORY OF NICOTINE DEPENDENCE: ICD-10-CM

## 2021-12-10 DIAGNOSIS — C61 PROSTATE CANCER (HCC): ICD-10-CM

## 2021-12-10 DIAGNOSIS — Z72.0 TOBACCO ABUSE: Chronic | ICD-10-CM

## 2021-12-10 DIAGNOSIS — Z12.2 ENCOUNTER FOR SCREENING FOR LUNG CANCER: ICD-10-CM

## 2021-12-10 PROCEDURE — 1160F RVW MEDS BY RX/DR IN RCRD: CPT | Performed by: FAMILY MEDICINE

## 2021-12-10 PROCEDURE — 1101F PT FALLS ASSESS-DOCD LE1/YR: CPT | Performed by: FAMILY MEDICINE

## 2021-12-10 PROCEDURE — 3008F BODY MASS INDEX DOCD: CPT | Performed by: FAMILY MEDICINE

## 2021-12-10 PROCEDURE — 3079F DIAST BP 80-89 MM HG: CPT | Performed by: FAMILY MEDICINE

## 2021-12-10 PROCEDURE — 3077F SYST BP >= 140 MM HG: CPT | Performed by: FAMILY MEDICINE

## 2021-12-10 PROCEDURE — 4004F PT TOBACCO SCREEN RCVD TLK: CPT | Performed by: FAMILY MEDICINE

## 2021-12-10 PROCEDURE — 3725F SCREEN DEPRESSION PERFORMED: CPT | Performed by: FAMILY MEDICINE

## 2021-12-10 PROCEDURE — 99213 OFFICE O/P EST LOW 20 MIN: CPT | Performed by: FAMILY MEDICINE

## 2021-12-10 PROCEDURE — 3288F FALL RISK ASSESSMENT DOCD: CPT | Performed by: FAMILY MEDICINE

## 2021-12-10 RX ORDER — LISINOPRIL 40 MG/1
40 TABLET ORAL DAILY
Qty: 90 TABLET | Refills: 1 | Status: SHIPPED | OUTPATIENT
Start: 2021-12-10 | End: 2022-03-09 | Stop reason: SDUPTHER

## 2022-03-03 ENCOUNTER — RA CDI HCC (OUTPATIENT)
Dept: OTHER | Facility: HOSPITAL | Age: 72
End: 2022-03-03

## 2022-03-03 NOTE — PROGRESS NOTES
[facility]  coding opportunities       Chart reviewed, no opportunity found: CHART REVIEWED, NO OPPORTUNITY FOUND                        Patients insurance company: [employer] [facility] [address]  (Medicare Advantage and Commercial)

## 2022-03-08 NOTE — PROGRESS NOTES
BMI Counseling: There is no height or weight on file to calculate BMI  The BMI is above normal  Nutrition recommendations include decreasing portion sizes, encouraging healthy choices of fruits and vegetables, decreasing fast food intake, consuming healthier snacks, limiting drinks that contain sugar, moderation in carbohydrate intake, increasing intake of lean protein, reducing intake of saturated and trans fat and reducing intake of cholesterol  Exercise recommendations include exercising 3-5 times per week  No pharmacotherapy was ordered  Patient referred to PCP  Rationale for BMI follow-up plan is due to patient being overweight or obese  Depression Screening and Follow-up Plan: Patient was screened for depression during today's encounter  They screened negative with a PHQ-2 score of 0  Falls Plan of Care: balance, strength, and gait training instructions were provided  Home safety education provided  Assessment/Plan:         Problem List Items Addressed This Visit        Cardiovascular and Mediastinum    Essential hypertension - Primary (Chronic)     Patient is stable with current anti-hypertensive medicine and continue to follow a low sodium diet and take current medication  All questions about this condition were answered today  Relevant Medications    lisinopril (ZESTRIL) 40 mg tablet    Other Relevant Orders    TSH + Free T4    Comprehensive metabolic panel    CBC and differential    Magnesium    Uric acid    Urinalysis with microscopic       Genitourinary    Prostate cancer [facility])     Patient is stable  and will continue present plan of care and reassess at next routine visit  All questions about this problem from patient were answered today  Other    Tobacco abuse (Chronic)     Patient encouraged to quit using tobacco for that increases their risk for COPD, lung cancer,stroke, oral cancer and heart disease   If patient does not want to quit they should let me know  when they are interested in quitting  There are numerous options to use to quit and we can discuss them  Other Visit Diagnoses     Encounter for screening for lung cancer        Personal history of nicotine dependence        Screening for colorectal cancer        Screening cholesterol level        Relevant Orders    Lipid Panel with Direct LDL reflex            Subjective:      Patient ID: Thiago Nassar is a 67 y o  male  This 23-year-old male here today for checkup on multiple medical problems patient with hypertension history of tobacco abuse and prostate cancer is doing very well  Patient is doing well his blood pressure will refill his medicine for him today  Patient is having no urinary symptoms at this point either  Discussed with patient about his CT scan screening for lung cancer he wants to think about that will get back to us in the future about  The following portions of the patient's history were reviewed and updated as appropriate:   Past Medical History:  He has a past medical history of Cancer ([facility] ), Essential hypertension (2/4/2018), Femur fracture, right ([facility] ), Gastritis (10/11/2016), and Tobacco abuse (10/11/2016)  ,  _______________________________________________________________________  Medical Problems:  does not have any pertinent problems on file ,  _______________________________________________________________________  Past Surgical History:   has a past surgical history that includes Cholecystectomy; Prostatectomy (2011); Fracture surgery; and pr open rx femur fx+intramed massimo (Left, 2/5/2018)  ,  _______________________________________________________________________  Family History:  Family history is unknown by patient  ,  _______________________________________________________________________  Social History:   reports that he has been smoking cigarettes and pipe  He has a 50 00 pack-year smoking history   He has never used smokeless tobacco  He reports that he does not drink alcohol and does not use drugs  ,  _______________________________________________________________________  Allergies:  has No Known Allergies     _______________________________________________________________________  Current Outpatient Medications   Medication Sig Dispense Refill    cholecalciferol (VITAMIN D3) 1,000 units tablet Take 1,000 Units by mouth daily      lisinopril (ZESTRIL) 40 mg tablet Take 1 tablet (40 mg total) by mouth daily 90 tablet 1    aspirin 81 MG tablet Take 81 mg by mouth 4 (four) times a week         No current facility-administered medications for this visit      _______________________________________________________________________  Review of Systems      Objective:  Vitals:    03/09/22 1053   BP: 144/74   BP Location: Left arm   Patient Position: Sitting   Cuff Size: Standard   Pulse: 74   Resp: (!) 74   Temp: 98 2 °F (36 8 °C)   SpO2: 94%   Weight: 68 9 kg (152 lb)   Height: 5' 6" (1 676 m)     Body mass index is 24 53 kg/m²       Physical Exam

## 2022-03-09 ENCOUNTER — OFFICE VISIT (OUTPATIENT)
Dept: FAMILY MEDICINE CLINIC | Facility: CLINIC | Age: 72
End: 2022-03-09
Payer: COMMERCIAL

## 2022-03-09 VITALS
TEMPERATURE: 98.2 F | RESPIRATION RATE: 74 BRPM | SYSTOLIC BLOOD PRESSURE: 144 MMHG | HEART RATE: 74 BPM | BODY MASS INDEX: 24.43 KG/M2 | WEIGHT: 152 LBS | HEIGHT: 66 IN | DIASTOLIC BLOOD PRESSURE: 74 MMHG | OXYGEN SATURATION: 94 %

## 2022-03-09 DIAGNOSIS — C61 PROSTATE CANCER (HCC): ICD-10-CM

## 2022-03-09 DIAGNOSIS — Z87.891 PERSONAL HISTORY OF NICOTINE DEPENDENCE: ICD-10-CM

## 2022-03-09 DIAGNOSIS — Z13.220 SCREENING CHOLESTEROL LEVEL: ICD-10-CM

## 2022-03-09 DIAGNOSIS — Z12.11 SCREENING FOR COLORECTAL CANCER: ICD-10-CM

## 2022-03-09 DIAGNOSIS — Z12.12 SCREENING FOR COLORECTAL CANCER: ICD-10-CM

## 2022-03-09 DIAGNOSIS — Z12.2 ENCOUNTER FOR SCREENING FOR LUNG CANCER: ICD-10-CM

## 2022-03-09 DIAGNOSIS — I10 ESSENTIAL HYPERTENSION: Primary | Chronic | ICD-10-CM

## 2022-03-09 DIAGNOSIS — Z72.0 TOBACCO ABUSE: Chronic | ICD-10-CM

## 2022-03-09 PROCEDURE — 99214 OFFICE O/P EST MOD 30 MIN: CPT | Performed by: FAMILY MEDICINE

## 2022-03-09 PROCEDURE — 3008F BODY MASS INDEX DOCD: CPT | Performed by: FAMILY MEDICINE

## 2022-03-09 PROCEDURE — 3077F SYST BP >= 140 MM HG: CPT | Performed by: FAMILY MEDICINE

## 2022-03-09 PROCEDURE — 3725F SCREEN DEPRESSION PERFORMED: CPT | Performed by: FAMILY MEDICINE

## 2022-03-09 PROCEDURE — 3288F FALL RISK ASSESSMENT DOCD: CPT | Performed by: FAMILY MEDICINE

## 2022-03-09 PROCEDURE — 1101F PT FALLS ASSESS-DOCD LE1/YR: CPT | Performed by: FAMILY MEDICINE

## 2022-03-09 PROCEDURE — 3078F DIAST BP <80 MM HG: CPT | Performed by: FAMILY MEDICINE

## 2022-03-09 PROCEDURE — 4004F PT TOBACCO SCREEN RCVD TLK: CPT | Performed by: FAMILY MEDICINE

## 2022-03-09 PROCEDURE — 1160F RVW MEDS BY RX/DR IN RCRD: CPT | Performed by: FAMILY MEDICINE

## 2022-03-09 RX ORDER — LISINOPRIL 40 MG/1
40 TABLET ORAL DAILY
Qty: 90 TABLET | Refills: 1 | Status: SHIPPED | OUTPATIENT
Start: 2022-03-09 | End: 2022-07-13 | Stop reason: SDUPTHER

## 2022-07-13 DIAGNOSIS — I10 ESSENTIAL HYPERTENSION: Chronic | ICD-10-CM

## 2022-07-13 RX ORDER — LISINOPRIL 40 MG/1
40 TABLET ORAL DAILY
Qty: 90 TABLET | Refills: 1 | Status: SHIPPED | OUTPATIENT
Start: 2022-07-13 | End: 2022-09-16 | Stop reason: SDUPTHER

## 2022-09-14 ENCOUNTER — VBI (OUTPATIENT)
Dept: ADMINISTRATIVE | Facility: OTHER | Age: 72
End: 2022-09-14

## 2022-09-16 ENCOUNTER — OFFICE VISIT (OUTPATIENT)
Dept: FAMILY MEDICINE CLINIC | Facility: CLINIC | Age: 72
End: 2022-09-16
Payer: COMMERCIAL

## 2022-09-16 VITALS
BODY MASS INDEX: 25.71 KG/M2 | HEART RATE: 66 BPM | SYSTOLIC BLOOD PRESSURE: 140 MMHG | DIASTOLIC BLOOD PRESSURE: 74 MMHG | HEIGHT: 66 IN | RESPIRATION RATE: 18 BRPM | OXYGEN SATURATION: 96 % | TEMPERATURE: 98.1 F | WEIGHT: 160 LBS

## 2022-09-16 DIAGNOSIS — Z00.00 WELL ADULT EXAM: ICD-10-CM

## 2022-09-16 DIAGNOSIS — C61 PROSTATE CANCER (HCC): ICD-10-CM

## 2022-09-16 DIAGNOSIS — I10 ESSENTIAL HYPERTENSION: Primary | Chronic | ICD-10-CM

## 2022-09-16 DIAGNOSIS — Z72.0 TOBACCO ABUSE: Chronic | ICD-10-CM

## 2022-09-16 PROCEDURE — 1101F PT FALLS ASSESS-DOCD LE1/YR: CPT | Performed by: FAMILY MEDICINE

## 2022-09-16 PROCEDURE — 3288F FALL RISK ASSESSMENT DOCD: CPT | Performed by: FAMILY MEDICINE

## 2022-09-16 PROCEDURE — G0439 PPPS, SUBSEQ VISIT: HCPCS | Performed by: FAMILY MEDICINE

## 2022-09-16 PROCEDURE — 1160F RVW MEDS BY RX/DR IN RCRD: CPT | Performed by: FAMILY MEDICINE

## 2022-09-16 PROCEDURE — 3725F SCREEN DEPRESSION PERFORMED: CPT | Performed by: FAMILY MEDICINE

## 2022-09-16 PROCEDURE — 1170F FXNL STATUS ASSESSED: CPT | Performed by: FAMILY MEDICINE

## 2022-09-16 PROCEDURE — 3077F SYST BP >= 140 MM HG: CPT | Performed by: FAMILY MEDICINE

## 2022-09-16 PROCEDURE — 99214 OFFICE O/P EST MOD 30 MIN: CPT | Performed by: FAMILY MEDICINE

## 2022-09-16 PROCEDURE — 3078F DIAST BP <80 MM HG: CPT | Performed by: FAMILY MEDICINE

## 2022-09-16 PROCEDURE — 1125F AMNT PAIN NOTED PAIN PRSNT: CPT | Performed by: FAMILY MEDICINE

## 2022-09-16 RX ORDER — LISINOPRIL 40 MG/1
40 TABLET ORAL DAILY
Qty: 90 TABLET | Refills: 1 | Status: SHIPPED | OUTPATIENT
Start: 2022-09-16

## 2022-09-16 NOTE — PATIENT INSTRUCTIONS
Medicare Preventive Visit Patient Instructions  Thank you for completing your Welcome to Medicare Visit or Medicare Annual Wellness Visit today  Your next wellness visit will be due in one year (9/17/2023)  The screening/preventive services that you may require over the next 5-10 years are detailed below  Some tests may not apply to you based off risk factors and/or age  Screening tests ordered at today's visit but not completed yet may show as past due  Also, please note that scanned in results may not display below  Preventive Screenings:  Service Recommendations Previous Testing/Comments   Colorectal Cancer Screening  · Colonoscopy    · Fecal Occult Blood Test (FOBT)/Fecal Immunochemical Test (FIT)  · Fecal DNA/Cologuard Test  · Flexible Sigmoidoscopy Age: 39-70 years old   Colonoscopy: every 10 years (May be performed more frequently if at higher risk)  OR  FOBT/FIT: every 1 year  OR  Cologuard: every 3 years  OR  Sigmoidoscopy: every 5 years  Screening may be recommended earlier than age 39 if at higher risk for colorectal cancer  Also, an individualized decision between you and your healthcare provider will decide whether screening between the ages of 74-80 would be appropriate   Colonoscopy: Not on file  FOBT/FIT: Not on file  Cologuard: Not on file  Sigmoidoscopy: Not on file          Prostate Cancer Screening Individualized decision between patient and health care provider in men between ages of 53-78   Medicare will cover every 12 months beginning on the day after your 50th birthday PSA: No results in last 5 years     History Prostate Cancer     Hepatitis C Screening Once for adults born between 1945 and 1965  More frequently in patients at high risk for Hepatitis C Hep C Antibody: 08/03/2021    Screening Current   Diabetes Screening 1-2 times per year if you're at risk for diabetes or have pre-diabetes Fasting glucose: No results in last 5 years (No results in last 5 years)  A1C: No results in last 5 years (No results in last 5 years)      Cholesterol Screening Once every 5 years if you don't have a lipid disorder  May order more often based on risk factors  Lipid panel: Not on file         Other Preventive Screenings Covered by Medicare:  1  Abdominal Aortic Aneurysm (AAA) Screening: covered once if your at risk  You're considered to be at risk if you have a family history of AAA or a male between the age of 73-68 who smoking at least 100 cigarettes in your lifetime  2  Lung Cancer Screening: covers low dose CT scan once per year if you meet all of the following conditions: (1) Age 50-69; (2) No signs or symptoms of lung cancer; (3) Current smoker or have quit smoking within the last 15 years; (4) You have a tobacco smoking history of at least 20 pack years (packs per day x number of years you smoked); (5) You get a written order from a healthcare provider  3  Glaucoma Screening: covered annually if you're considered high risk: (1) You have diabetes OR (2) Family history of glaucoma OR (3)  aged 48 and older OR (3)  American aged 72 and older  3  Osteoporosis Screening: covered every 2 years if you meet one of the following conditions: (1) Have a vertebral abnormality; (2) On glucocorticoid therapy for more than 3 months; (3) Have primary hyperparathyroidism; (4) On osteoporosis medications and need to assess response to drug therapy  5  HIV Screening: covered annually if you're between the age of 12-76  Also covered annually if you are younger than 13 and older than 72 with risk factors for HIV infection  For pregnant patients, it is covered up to 3 times per pregnancy      Immunizations:  Immunization Recommendations   Influenza Vaccine Annual influenza vaccination during flu season is recommended for all persons aged >= 6 months who do not have contraindications   Pneumococcal Vaccine   * Pneumococcal conjugate vaccine = PCV13 (Prevnar 13), PCV15 (Vaxneuvance), PCV20 (Prevnar 20)  * Pneumococcal polysaccharide vaccine = PPSV23 (Pneumovax) Adults 2364 years old: 1-3 doses may be recommended based on certain risk factors  Adults 72 years old: 1-2 doses may be recommended based off what pneumonia vaccine you previously received   Hepatitis B Vaccine 3 dose series if at intermediate or high risk (ex: diabetes, end stage renal disease, liver disease)   Tetanus (Td) Vaccine - COST NOT COVERED BY MEDICARE PART B Following completion of primary series, a booster dose should be given every 10 years to maintain immunity against tetanus  Td may also be given as tetanus wound prophylaxis  Tdap Vaccine - COST NOT COVERED BY MEDICARE PART B Recommended at least once for all adults  For pregnant patients, recommended with each pregnancy  Shingles Vaccine (Shingrix) - COST NOT COVERED BY MEDICARE PART B  2 shot series recommended in those aged 48 and above     Health Maintenance Due:      Topic Date Due    Colorectal Cancer Screening  Never done    Lung Cancer Screening  Never done    Hepatitis C Screening  Completed     Immunizations Due:      Topic Date Due    COVID-19 Vaccine (1) Never done    Pneumococcal Vaccine: 65+ Years (1 - PCV) Never done    Influenza Vaccine (1) 09/01/2022     Advance Directives   What are advance directives? Advance directives are legal documents that state your wishes and plans for medical care  These plans are made ahead of time in case you lose your ability to make decisions for yourself  Advance directives can apply to any medical decision, such as the treatments you want, and if you want to donate organs  What are the types of advance directives? There are many types of advance directives, and each state has rules about how to use them  You may choose a combination of any of the following:  · Living will: This is a written record of the treatment you want   You can also choose which treatments you do not want, which to limit, and which to stop at a certain time  This includes surgery, medicine, IV fluid, and tube feedings  · Durable power of  for healthcare [facility]): This is a written record that states who you want to make healthcare choices for you when you are unable to make them for yourself  This person, called a proxy, is usually a family member or a friend  You may choose more than 1 proxy  · Do not resuscitate (DNR) order:  A DNR order is used in case your heart stops beating or you stop breathing  It is a request not to have certain forms of treatment, such as CPR  A DNR order may be included in other types of advance directives  · Medical directive: This covers the care that you want if you are in a coma, near death, or unable to make decisions for yourself  You can list the treatments you want for each condition  Treatment may include pain medicine, surgery, blood transfusions, dialysis, IV or tube feedings, and a ventilator (breathing machine)  · Values history: This document has questions about your views, beliefs, and how you feel and think about life  This information can help others choose the care that you would choose  Why are advance directives important? An advance directive helps you control your care  Although spoken wishes may be used, it is better to have your wishes written down  Spoken wishes can be misunderstood, or not followed  Treatments may be given even if you do not want them  An advance directive may make it easier for your family to make difficult choices about your care  Cigarette Smoking and Your Health   Risks to your health if you smoke:  Nicotine and other chemicals found in tobacco damage every cell in your body  Even if you are a light smoker, you have an increased risk for cancer, heart disease, and lung disease  If you are pregnant or have diabetes, smoking increases your risk for complications     Benefits to your health if you stop smoking:   · You decrease respiratory symptoms such as coughing, wheezing, and shortness of breath  · You reduce your risk for cancers of the lung, mouth, throat, kidney, bladder, pancreas, stomach, and cervix  If you already have cancer, you increase the benefits of chemotherapy  You also reduce your risk for cancer returning or a second cancer from developing  · You reduce your risk for heart disease, blood clots, heart attack, and stroke  · You reduce your risk for lung infections, and diseases such as pneumonia, asthma, chronic bronchitis, and emphysema  · Your circulation improves  More oxygen can be delivered to your body  If you have diabetes, you lower your risk for complications, such as kidney, artery, and eye diseases  You also lower your risk for nerve damage  Nerve damage can lead to amputations, poor vision, and blindness  · You improve your body's ability to heal and to fight infections  For more information and support to stop smoking:   · [employer]  Phone: 9- 910 - 501-2633  Web Address: [employer]  Weight Management   Why it is important to manage your weight:  Being overweight increases your risk of health conditions such as heart disease, high blood pressure, type 2 diabetes, and certain types of cancer  It can also increase your risk for osteoarthritis, sleep apnea, and other respiratory problems  Aim for a slow, steady weight loss  Even a small amount of weight loss can lower your risk of health problems  How to lose weight safely:  A safe and healthy way to lose weight is to eat fewer calories and get regular exercise  You can lose up about 1 pound a week by decreasing the number of calories you eat by 500 calories each day  Healthy meal plan for weight management:  A healthy meal plan includes a variety of foods, contains fewer calories, and helps you stay healthy  A healthy meal plan includes the following:  · Eat whole-grain foods more often  A healthy meal plan should contain fiber   Fiber is the part of grains, fruits, and vegetables that is not broken down by your body  Whole-grain foods are healthy and provide extra fiber in your diet  Some examples of whole-grain foods are whole-wheat breads and pastas, oatmeal, brown rice, and bulgur  · Eat a variety of vegetables every day  Include dark, leafy greens such as spinach, kale, alex greens, and mustard greens  Eat yellow and orange vegetables such as carrots, sweet potatoes, and winter squash  · Eat a variety of fruits every day  Choose fresh or canned fruit (canned in its own juice or light syrup) instead of juice  Fruit juice has very little or no fiber  · Eat low-fat dairy foods  Drink fat-free (skim) milk or 1% milk  Eat fat-free yogurt and low-fat cottage cheese  Try low-fat cheeses such as mozzarella and other reduced-fat cheeses  · Choose meat and other protein foods that are low in fat  Choose beans or other legumes such as split peas or lentils  Choose fish, skinless poultry (chicken or turkey), or lean cuts of red meat (beef or pork)  Before you cook meat or poultry, cut off any visible fat  · Use less fat and oil  Try baking foods instead of frying them  Add less fat, such as margarine, sour cream, regular salad dressing and mayonnaise to foods  Eat fewer high-fat foods  Some examples of high-fat foods include french fries, doughnuts, ice cream, and cakes  · Eat fewer sweets  Limit foods and drinks that are high in sugar  This includes candy, cookies, regular soda, and sweetened drinks  Exercise:  Exercise at least 30 minutes per day on most days of the week  Some examples of exercise include walking, biking, dancing, and swimming  You can also fit in more physical activity by taking the stairs instead of the elevator or parking farther away from stores  Ask your healthcare provider about the best exercise plan for you  © Copyright [employer] 2018 Information is for End User's use only and may not be sold, redistributed or otherwise used for commercial purposes  All illustrations and images included in CareNotes® are the copyrighted property of [employer] A M [employer] Inc  or Marylou Villaseñor

## 2022-09-16 NOTE — PROGRESS NOTES
Name: Dian Mark      : 1950      MRN: [id number]  Encounter Provider: Jeannie Sam MD  Encounter Date: 2022   Encounter department: [address] [place] [address] Place     1  Essential hypertension  -     lisinopril (ZESTRIL) 40 mg tablet; Take 1 tablet (40 mg total) by mouth daily    2  Prostate cancer [facility])  Assessment & Plan:  Patient is stable  and will continue present plan of care and reassess at next routine visit  All questions about this problem from patient were answered today  3  Tobacco abuse  Assessment & Plan:  Patient encouraged to quit using tobacco for that increases their risk for COPD, lung cancer,stroke, oral cancer and heart disease  If patient does not want to quit they should let me know  when they are interested in quitting  There are numerous options to use to quit and we can discuss them  4  Well adult exam        Falls Plan of Care: balance, strength, and gait training instructions were provided  Home safety education provided  Tobacco Cessation Counseling: Tobacco cessation counseling was provided  The patient is sincerely urged to quit consumption of tobacco  He is not ready to quit tobacco  Medication options discussed  Side effects of medication not discussed  Patient agreed to medication           Subjective     HPI  Review of Systems    Past Medical History:   Diagnosis Date    Cancer [facility])     PROSTATE    Essential hypertension 2018    Femur fracture, right ([address] )     Gastritis 10/11/2016    Tobacco abuse 10/11/2016     Past Surgical History:   Procedure Laterality Date    CHOLECYSTECTOMY      FRACTURE SURGERY      MI OPEN RX FEMUR FX+INTRAMED ELVA Left 2018    Procedure: LEFT HIP SHORT TROCHANTERIC FEMORAL NAIL;  Surgeon: Chelsie Acharya MD;  [facility];  Service: Orthopedics    PROSTATECTOMY       Family History   Family history unknown: Yes     Social History     Socioeconomic History    Marital status: /Civil Union     Spouse name: None    Number of children: None    Years of education: None    Highest education level: None   Occupational History    None   Tobacco Use    Smoking status: Current Every Day Smoker     Packs/day: 1 00     Years: 50 00     Pack years: 50 00     Types: Cigarettes, Pipe    Smokeless tobacco: Never Used    Tobacco comment: smokes pipe tabacco    Vaping Use    Vaping Use: Never used   Substance and Sexual Activity    Alcohol use: No    Drug use: No    Sexual activity: None   Other Topics Concern    None   Social History Narrative    · Most recent tobacco use screenin2019      · Do you currently or have you served in [employer]:   No      · Were you activated, into active duty, as a member of the [employer] or as a Reservist:   No      · Sexual orientation:   Heterosexual      · Exercise level:   Occasional      · Diet:   Regular      · General stress level:   Medium      · Alcohol intake:   None      · Caffeine intake:   Occasional      · Guns present in home: Yes      · Seat belts used routinely:   Yes      · Sunscreen used routinely:   No      · Smoke alarm in home:   Yes      Social Determinants of Health     Financial Resource Strain: Low Risk     Difficulty of Paying Living Expenses: Not hard at all   Food Insecurity: Not on file   Transportation Needs: No Transportation Needs    Lack of Transportation (Medical): No    Lack of Transportation (Non-Medical):  No   Physical Activity: Not on file   Stress: Not on file   Social Connections: Not on file   Intimate Partner Violence: Not on file   Housing Stability: Not on file     Current Outpatient Medications on File Prior to Visit   Medication Sig    cholecalciferol (VITAMIN D3) 1,000 units tablet Take 1,000 Units by mouth daily    [DISCONTINUED] lisinopril (ZESTRIL) 40 mg tablet Take 1 tablet (40 mg total) by mouth daily    aspirin 81 MG tablet Take 81 mg by mouth 4 (four) times a week   (Patient not taking: Reported on 9/16/2022)     No Known Allergies    There is no immunization history on file for this patient      Objective     /74 (BP Location: Left arm, Patient Position: Sitting, Cuff Size: Standard)   Pulse 66   Temp 98 1 °F (36 7 °C) (Temporal)   Resp 18   Ht 5' 6" (1 676 m)   Wt 72 6 kg (160 lb)   SpO2 96%   BMI 25 82 kg/m²     Physical Exam  Pedrito Chauhan MD

## 2022-09-16 NOTE — PROGRESS NOTES
Assessment and Plan:     Problem List Items Addressed This Visit        Cardiovascular and Mediastinum    Essential hypertension - Primary (Chronic)    Relevant Medications    lisinopril (ZESTRIL) 40 mg tablet       Genitourinary    Prostate cancer [facility])     Patient is stable  and will continue present plan of care and reassess at next routine visit  All questions about this problem from patient were answered today  Other    Tobacco abuse (Chronic)     Patient encouraged to quit using tobacco for that increases their risk for COPD, lung cancer,stroke, oral cancer and heart disease  If patient does not want to quit they should let me know  when they are interested in quitting  There are numerous options to use to quit and we can discuss them  Other Visit Diagnoses     Well adult exam               Preventive health issues were discussed with patient, and age appropriate screening tests were ordered as noted in patient's After Visit Summary  Personalized health advice and appropriate referrals for health education or preventive services given if needed, as noted in patient's After Visit Summary  History of Present Illness:     Patient presents for a Medicare Wellness Visit    This 16-year-old male here today for checkup on multiple medical problems patient history of hypertension tobacco abuse history of prostate the Ca and is stable  Patient had lab work done which was reviewed cholesterol was normal as kidney function looks pretty good and he has no problems sugar or liver function test   Patient also has no knee Sana is thyroid is functioning properly  Patient needs refills on his medications and that was done for him today       Patient Care Team:  Daisy Nguyen MD as PCP - General (Family Medicine)     Review of Systems:     Review of Systems     Problem List:     Patient Active Problem List   Diagnosis    Gastritis    Tobacco abuse    Essential hypertension    Intertrochanteric fracture of left femur (HCC)    Prostate cancer [facility])      Past Medical and Surgical History:     Past Medical History:   Diagnosis Date    Cancer [facility])     PROSTATE    Essential hypertension 2018    Femur fracture, right ([address] )     Gastritis 10/11/2016    Tobacco abuse 10/11/2016     Past Surgical History:   Procedure Laterality Date    CHOLECYSTECTOMY      FRACTURE SURGERY      OK OPEN RX FEMUR FX+INTRAMED ELVA Left 2018    Procedure: LEFT HIP SHORT TROCHANTERIC FEMORAL NAIL;  Surgeon: Deandra Estrada MD;  [facility];  Service: Orthopedics    PROSTATECTOMY        Family History:     Family History   Family history unknown: Yes      Social History:     Social History     Socioeconomic History    Marital status: /Civil Union     Spouse name: None    Number of children: None    Years of education: None    Highest education level: None   Occupational History    None   Tobacco Use    Smoking status: Current Every Day Smoker     Packs/day: 1 00     Years: 50 00     Pack years: 50 00     Types: Cigarettes, Pipe    Smokeless tobacco: Never Used    Tobacco comment: smokes pipe tabacco    Vaping Use    Vaping Use: Never used   Substance and Sexual Activity    Alcohol use: No    Drug use: No    Sexual activity: None   Other Topics Concern    None   Social History Narrative    · Most recent tobacco use screenin2019      · Do you currently or have you served in the [place] [employer]:   No      · Were you activated, into active duty, as a member of the [employer] or as a Reservist:   No      · Sexual orientation:   Heterosexual      · Exercise level:   Occasional      · Diet:   Regular      · General stress level:   Medium      · Alcohol intake:   None      · Caffeine intake:   Occasional      · Guns present in home:    Yes      · Seat belts used routinely:   Yes      · Sunscreen used routinely:   No      · Smoke alarm in home:   Yes      Social Determinants of Health Financial Resource Strain: Low Risk     Difficulty of Paying Living Expenses: Not hard at all   Food Insecurity: Not on file   Transportation Needs: No Transportation Needs    Lack of Transportation (Medical): No    Lack of Transportation (Non-Medical): No   Physical Activity: Not on file   Stress: Not on file   Social Connections: Not on file   Intimate Partner Violence: Not on file   Housing Stability: Not on file      Medications and Allergies:     Current Outpatient Medications   Medication Sig Dispense Refill    cholecalciferol (VITAMIN D3) 1,000 units tablet Take 1,000 Units by mouth daily      lisinopril (ZESTRIL) 40 mg tablet Take 1 tablet (40 mg total) by mouth daily 90 tablet 1    aspirin 81 MG tablet Take 81 mg by mouth 4 (four) times a week   (Patient not taking: Reported on 9/16/2022)       No current facility-administered medications for this visit  No Known Allergies   Immunizations: There is no immunization history on file for this patient  Health Maintenance:         Topic Date Due    Colorectal Cancer Screening  Never done    Lung Cancer Screening  Never done    Hepatitis C Screening  Completed         Topic Date Due    COVID-19 Vaccine (1) Never done    Pneumococcal Vaccine: 65+ Years (1 - PCV) Never done    Influenza Vaccine (1) 09/01/2022      Medicare Screening Tests and Risk Assessments: Nav is here for his Subsequent Wellness visit  Health Risk Assessment:   Patient rates overall health as very good  Patient feels that their physical health rating is same  Patient is very satisfied with their life  Eyesight was rated as same  Hearing was rated as slightly worse  Patient feels that their emotional and mental health rating is same  Patients states they are never, rarely angry  Patient states they are never, rarely unusually tired/fatigued  Pain experienced in the last 7 days has been none   Patient states that he has experienced no weight loss or gain in last 6 months  Depression Screening:   PHQ-2 Score: 1      Fall Risk Screening: In the past year, patient has experienced: no history of falling in past year      Home Safety:  Patient does not have trouble with stairs inside or outside of their home  Patient has no working smoke alarms and has no working carbon monoxide detector  Home safety hazards include: none  Nutrition:   Current diet is Regular  Medications:   Patient is not currently taking any over-the-counter supplements  Patient is able to manage medications  Activities of Daily Living (ADLs)/Instrumental Activities of Daily Living (IADLs):   Walk and transfer into and out of bed and chair?: Yes  Dress and groom yourself?: Yes    Bathe or shower yourself?: Yes    Feed yourself? Yes  Do your laundry/housekeeping?: Yes  Manage your money, pay your bills and track your expenses?: Yes  Make your own meals?: Yes    Do your own shopping?: Yes    Previous Hospitalizations:   Any hospitalizations or ED visits within the last 12 months?: No      Advance Care Planning:   Living will: No    Durable POA for healthcare: No    Advanced directive: No    Advanced directive counseling given: No    Five wishes given: No    Patient declined ACP directive: No      Cognitive Screening:   Provider or family/friend/caregiver concerned regarding cognition?: No    PREVENTIVE SCREENINGS        Prostate Cancer Screening:    General: History Prostate Cancer      Abdominal Aortic Aneurysm (AAA) Screening:    Risk factors include: age between 73-67 yo and tobacco use        Hepatitis C Screening:    General: Screening Current    Screening, Brief Intervention, and Referral to Treatment (SBIRT)    Screening  Typical number of drinks in a day: 0  Typical number of drinks in a week: 0  Interpretation: Low risk drinking behavior      Single Item Drug Screening:  How often have you used an illegal drug (including marijuana) or a prescription medication for non-medical reasons in the past year? never    Single Item Drug Screen Score: 0  Interpretation: Negative screen for possible drug use disorder    No exam data present     Physical Exam:     /74 (BP Location: Left arm, Patient Position: Sitting, Cuff Size: Standard)   Pulse 66   Temp 98 1 °F (36 7 °C) (Temporal)   Resp 18   Ht 5' 6" (1 676 m)   Wt 72 6 kg (160 lb)   SpO2 96%   BMI 25 82 kg/m²     Physical Exam     Sami Casey MD

## 2023-03-06 ENCOUNTER — RA CDI HCC (OUTPATIENT)
Dept: OTHER | Facility: HOSPITAL | Age: 73
End: 2023-03-06

## 2023-03-06 NOTE — PROGRESS NOTES
[facility]  coding opportunities       Chart reviewed, no opportunity found:   [address]        Patients Insurance     Medicare Insurance: [employer] Advantage

## 2023-03-10 ENCOUNTER — OFFICE VISIT (OUTPATIENT)
Dept: FAMILY MEDICINE CLINIC | Facility: CLINIC | Age: 73
End: 2023-03-10

## 2023-03-10 VITALS
WEIGHT: 163 LBS | TEMPERATURE: 97.7 F | BODY MASS INDEX: 26.2 KG/M2 | OXYGEN SATURATION: 97 % | DIASTOLIC BLOOD PRESSURE: 84 MMHG | HEART RATE: 74 BPM | HEIGHT: 66 IN | SYSTOLIC BLOOD PRESSURE: 140 MMHG

## 2023-03-10 DIAGNOSIS — Z12.2 ENCOUNTER FOR SCREENING FOR LUNG CANCER: ICD-10-CM

## 2023-03-10 DIAGNOSIS — I10 ESSENTIAL HYPERTENSION: Chronic | ICD-10-CM

## 2023-03-10 DIAGNOSIS — Z72.0 TOBACCO ABUSE: Primary | Chronic | ICD-10-CM

## 2023-03-10 DIAGNOSIS — Z13.220 SCREENING CHOLESTEROL LEVEL: ICD-10-CM

## 2023-03-10 DIAGNOSIS — K29.70 GASTRITIS WITHOUT BLEEDING, UNSPECIFIED CHRONICITY, UNSPECIFIED GASTRITIS TYPE: Chronic | ICD-10-CM

## 2023-03-10 DIAGNOSIS — C61 PROSTATE CANCER (HCC): ICD-10-CM

## 2023-03-10 DIAGNOSIS — F17.210 SMOKING GREATER THAN 20 PACK YEARS: ICD-10-CM

## 2023-03-10 RX ORDER — LISINOPRIL 40 MG/1
40 TABLET ORAL DAILY
Qty: 90 TABLET | Refills: 1 | Status: SHIPPED | OUTPATIENT
Start: 2023-03-10

## 2023-03-10 NOTE — PROGRESS NOTES
Name: Gopi Douglass      : 1950      MRN: [id number]  Encounter Provider: Sandrine Ferrell MD  Encounter Date: 3/10/2023   Encounter department: [address] [place] [address] Place     1  Tobacco abuse  Assessment & Plan:  Patient encouraged to quit using tobacco for that increases their risk for COPD, lung cancer,stroke, oral cancer and heart disease  If patient does not want to quit they should let me know  when they are interested in quitting  There are numerous options to use to quit and we can discuss them  2  Encounter for screening for lung cancer    3  Smoking greater than 20 pack years    4  Prostate cancer [facility])  Assessment & Plan:  Patient is stable  and will continue present plan of care and reassess at next routine visit  All questions about this problem from patient were answered today  5  Gastritis without bleeding, unspecified chronicity, unspecified gastritis type    6  Essential hypertension  Assessment & Plan:  Patient is stable with current anti-hypertensive medicine and continue to follow a low sodium diet and take current medication  All questions about this condition were answered today  Orders:  -     Comprehensive metabolic panel; Future  -     CBC and differential; Future  -     Magnesium; Future  -     Uric acid; Future  -     Urinalysis with microscopic  -     TSH, 3rd generation with Free T4 reflex; Future  -     lisinopril (ZESTRIL) 40 mg tablet; Take 1 tablet (40 mg total) by mouth daily    7  Screening cholesterol level  -     Lipid Panel with Direct LDL reflex; Future      BMI Counseling: There is no height or weight on file to calculate BMI   The BMI is above normal  Nutrition recommendations include decreasing portion sizes, encouraging healthy choices of fruits and vegetables, decreasing fast food intake, consuming healthier snacks, limiting drinks that contain sugar, moderation in carbohydrate intake, increasing intake of lean protein, reducing intake of saturated and trans fat and reducing intake of cholesterol  Exercise recommendations include exercising 3-5 times per week  No pharmacotherapy was ordered  Patient referred to PCP  Rationale for BMI follow-up plan is due to patient being overweight or obese  Falls Plan of Care: balance, strength, and gait training instructions were provided  Home safety education provided  Subjective     59-year-old male here today for checkup on multimedical problems patient hypertension tobacco abuse history of right femur fracture history of gastritis and a history of prostate cancer  Patient is doing well although he is having a very stressful day because there was a death in his family as of today  Patient blood pressure is stable and we will refill his medicines when he needs that      Review of Systems    Past Medical History:   Diagnosis Date   • Cancer ([facility] [address] )     PROSTATE   • Essential hypertension 2/4/2018   • Femur fracture, right ([facility] [address] [facility] )    • Gastritis 10/11/2016   • Tobacco abuse 10/11/2016     Past Surgical History:   Procedure Laterality Date   • CHOLECYSTECTOMY     • FRACTURE SURGERY     • [place] OPTX FEM SHFT FX W/INSJ IMED IMPLT W/WO SCREW Left 2/5/2018    Procedure: LEFT HIP SHORT TROCHANTERIC FEMORAL NAIL;  Surgeon: aFrideh Garcia MD;  [facility];  Service: Orthopedics   • PROSTATECTOMY  2011     Family History   Family history unknown: Yes     Social History     Socioeconomic History   • Marital status: /Civil Union     Spouse name: None   • Number of children: None   • Years of education: None   • Highest education level: None   Occupational History   • None   Tobacco Use   • Smoking status: Every Day     Types: Pipe   • Smokeless tobacco: Never   • Tobacco comments:     smokes pipe tabacco    Vaping Use   • Vaping Use: Never used   Substance and Sexual Activity   • Alcohol use: No   • Drug use: No   • Sexual activity: None   Other Topics Concern   • None Social History Narrative    · Most recent tobacco use screenin2019      · Do you currently or have you served in the [employer]:   No      · Were you activated, into active duty, as a member of the [employer] or as a Reservist:   No      · Sexual orientation:   Heterosexual      · Exercise level:   Occasional      · Diet:   Regular      · General stress level:   Medium      · Alcohol intake:   None      · Caffeine intake:   Occasional      · Guns present in home: Yes      · Seat belts used routinely:   Yes      · Sunscreen used routinely:   No      · Smoke alarm in home:   Yes      Social Determinants of Health     Financial Resource Strain: Low Risk    • Difficulty of Paying Living Expenses: Not hard at all   Food Insecurity: Not on file   Transportation Needs: No Transportation Needs   • Lack of Transportation (Medical): No   • Lack of Transportation (Non-Medical): No   Physical Activity: Not on file   Stress: Not on file   Social Connections: Not on file   Intimate Partner Violence: Not on file   Housing Stability: Not on file     Current Outpatient Medications on File Prior to Visit   Medication Sig   • cholecalciferol (VITAMIN D3) 1,000 units tablet Take 1,000 Units by mouth daily   • [DISCONTINUED] lisinopril (ZESTRIL) 40 mg tablet Take 1 tablet (40 mg total) by mouth daily   • aspirin 81 MG tablet Take 81 mg by mouth 4 (four) times a week   (Patient not taking: Reported on 2022)     No Known Allergies    There is no immunization history on file for this patient      Objective     /84 (BP Location: Left arm, Patient Position: Sitting, Cuff Size: Standard)   Pulse 74   Temp 97 7 °F (36 5 °C) (Temporal)   Ht 5' 6" (1 676 m)   Wt 73 9 kg (163 lb)   SpO2 97%   BMI 26 31 kg/m²     Physical Exam  Lucky Primrose, MD

## 2023-09-19 NOTE — PROGRESS NOTES
Name: Cory Becerril      : 1950      MRN: [id number]  Encounter Provider: Maryann Durbin MD  Encounter Date: 2023   Encounter department: [address] [place] [address] Avenue     1. Well adult exam    2. Prostate cancer [facility])  Assessment & Plan:  Patient is stable  and will continue present plan of care and reassess at next routine visit. All questions about this problem from patient were answered today. 3. Essential hypertension  Assessment & Plan:  Patient is stable with current anti-hypertensive medicine and continue to follow a low sodium diet and take current medication. All questions about this condition were answered today. Orders:  -     Comprehensive metabolic panel; Future    4. Tobacco abuse  Assessment & Plan:  Patient encouraged to quit using tobacco for that increases their risk for COPD, lung cancer,stroke, oral cancer and heart disease. If patient does not want to quit they should let me know  when they are interested in quitting. There are numerous options to use to quit and we can discuss them. 5. Stage 3b chronic kidney disease (CKD) [facility])  Assessment & Plan:  Lab Results   Component Value Date    EGFR 63 2018    EGFR 61 2018    EGFR 65 2018    CREATININE 1.18 2018    CREATININE 1.20 2018    CREATININE 1.14 2018   Pt to avoid NSAIDs and any IV dyes. Patient to follow up eoither with nephrology or  with us for  further  monitoring of  renal function. Depression Screening and Follow-up Plan: Patient was screened for depression during today's encounter. They screened negative with a PHQ-2 score of 0. Tobacco Cessation Counseling: Tobacco cessation counseling was provided. The patient is sincerely urged to quit consumption of tobacco. He is ready to quit tobacco. Medication options and side effects of medication discussed. Patient agreed to medication.          Subjective     78-year-old male here today for checkup on multimedical problems as well as Medicare wellness. Patient with hypertension prostate cancer and tobacco abuse. Patient well with his medications and needs refills on his blood pressure medicine which was done for him today. Patient also has a stage IIIb kidney disease that he flash vacillates between 3 yea. Discussed him about increasing his fluids and watching his blood pressure. Patient also had lab work reviewed today. Review of Systems   Constitutional: Negative for activity change, appetite change, chills, fatigue, fever and unexpected weight change. HENT: Negative for congestion, ear pain, hearing loss, mouth sores, postnasal drip, sinus pressure, sinus pain, sneezing and sore throat. Respiratory: Negative for apnea, cough, shortness of breath and wheezing. Cardiovascular: Negative for chest pain, palpitations and leg swelling. Gastrointestinal: Negative for abdominal pain, constipation, diarrhea, nausea and vomiting. Endocrine: Negative for cold intolerance and heat intolerance. Genitourinary: Negative for dysuria, frequency and hematuria. Musculoskeletal: Negative for arthralgias, back pain, gait problem, joint swelling and neck pain. Skin: Negative for rash. Neurological: Negative for dizziness, weakness and numbness. Hematological: Does not bruise/bleed easily. Psychiatric/Behavioral: Negative for agitation, behavioral problems, confusion, hallucinations and sleep disturbance. The patient is not nervous/anxious.         Past Medical History:   Diagnosis Date   • Cancer [facility])     PROSTATE   • Essential hypertension 2/4/2018   • Femur fracture, right ([address])    • Gastritis 10/11/2016   • Tobacco abuse 10/11/2016     Past Surgical History:   Procedure Laterality Date   • CHOLECYSTECTOMY     • FRACTURE SURGERY     • [place] OPTX FEM SHFT FX W/INSJ IMED IMPLT W/WO SCREW Left 2/5/2018    Procedure: LEFT HIP SHORT TROCHANTERIC FEMORAL NAIL;  Surgeon: Leonor Gardner MD;  [facility];  Service: Orthopedics   • PROSTATECTOMY       Family History   Family history unknown: Yes     Social History     Socioeconomic History   • Marital status: /Civil Union     Spouse name: None   • Number of children: None   • Years of education: None   • Highest education level: None   Occupational History   • None   Tobacco Use   • Smoking status: Every Day     Types: Pipe   • Smokeless tobacco: Never   • Tobacco comments:     smokes pipe tabacco    Vaping Use   • Vaping Use: Never used   Substance and Sexual Activity   • Alcohol use: No   • Drug use: No   • Sexual activity: None   Other Topics Concern   • None   Social History Narrative    · Most recent tobacco use screenin2019      · Do you currently or have you served in the [address] [place] [address] [employer]):   No      · Were you activated, into active duty, as a member of the [employer] or as a Reservist:   No      · Sexual orientation:   Heterosexual      · Exercise level:   Occasional      · Diet:   Regular      · General stress level:   Medium      · Alcohol intake:   None      · Caffeine intake:   Occasional      · Guns present in home: Yes      · Seat belts used routinely:   Yes      · Sunscreen used routinely:   No      · Smoke alarm in home:   Yes      Social Determinants of Health     Financial Resource Strain: Low Risk  (2023)    Overall Financial Resource Strain (CARDIA)    • Difficulty of Paying Living Expenses: Not hard at all   Food Insecurity: Not on file   Transportation Needs: No Transportation Needs (2023)    PRAPARE - Transportation    • Lack of Transportation (Medical): No    • Lack of Transportation (Non-Medical):  No   Physical Activity: Not on file   Stress: Not on file   Social Connections: Not on file   Intimate Partner Violence: Not on file   Housing Stability: Not on file     Current Outpatient Medications on File Prior to Visit   Medication Sig   • cholecalciferol (VITAMIN D3) 1,000 units tablet Take 1,000 Units by mouth daily   • lisinopril (ZESTRIL) 40 mg tablet Take 1 tablet (40 mg total) by mouth daily   • [DISCONTINUED] aspirin 81 MG tablet Take 81 mg by mouth 4 (four) times a week   (Patient not taking: Reported on 9/16/2022)     No Known Allergies    There is no immunization history on file for this patient. Objective     /82 (BP Location: Left arm, Patient Position: Sitting, Cuff Size: Standard)   Pulse 76   Temp 97.9 °F (36.6 °C) (Skin)   Ht 5' 6" (1.676 m)   Wt 71.2 kg (157 lb)   SpO2 95%   BMI 25.34 kg/m²     Physical Exam  Vitals and nursing note reviewed. Constitutional:       Appearance: He is well-developed. HENT:      Head: Normocephalic and atraumatic. Nose: Nose normal.   Eyes:      General: No scleral icterus. Conjunctiva/sclera: Conjunctivae normal.      Pupils: Pupils are equal, round, and reactive to light. Neck:      Thyroid: No thyromegaly. Cardiovascular:      Rate and Rhythm: Normal rate and regular rhythm. Heart sounds: Normal heart sounds. Pulmonary:      Effort: Pulmonary effort is normal. No respiratory distress. Breath sounds: Normal breath sounds. No wheezing. Abdominal:      General: Bowel sounds are normal.      Palpations: Abdomen is soft. Tenderness: There is no abdominal tenderness. There is no guarding or rebound. Musculoskeletal:         General: Normal range of motion. Cervical back: Normal range of motion and neck supple. Skin:     General: Skin is warm and dry. Findings: No rash. Neurological:      Mental Status: He is alert and oriented to person, place, and time.    Psychiatric:         Behavior: Behavior normal.       Leonor Yi MD

## 2023-09-20 ENCOUNTER — OFFICE VISIT (OUTPATIENT)
Dept: FAMILY MEDICINE CLINIC | Facility: CLINIC | Age: 73
End: 2023-09-20
Payer: COMMERCIAL

## 2023-09-20 VITALS
WEIGHT: 157 LBS | HEART RATE: 76 BPM | BODY MASS INDEX: 25.23 KG/M2 | TEMPERATURE: 97.9 F | HEIGHT: 66 IN | SYSTOLIC BLOOD PRESSURE: 140 MMHG | DIASTOLIC BLOOD PRESSURE: 82 MMHG | OXYGEN SATURATION: 95 %

## 2023-09-20 DIAGNOSIS — Z00.00 WELL ADULT EXAM: Primary | ICD-10-CM

## 2023-09-20 DIAGNOSIS — C61 PROSTATE CANCER (HCC): ICD-10-CM

## 2023-09-20 DIAGNOSIS — N18.32 STAGE 3B CHRONIC KIDNEY DISEASE (CKD) (HCC): ICD-10-CM

## 2023-09-20 DIAGNOSIS — Z72.0 TOBACCO ABUSE: Chronic | ICD-10-CM

## 2023-09-20 DIAGNOSIS — I10 ESSENTIAL HYPERTENSION: Chronic | ICD-10-CM

## 2023-09-20 PROCEDURE — G0439 PPPS, SUBSEQ VISIT: HCPCS | Performed by: FAMILY MEDICINE

## 2023-09-20 PROCEDURE — 99214 OFFICE O/P EST MOD 30 MIN: CPT | Performed by: FAMILY MEDICINE

## 2023-09-20 NOTE — PATIENT INSTRUCTIONS
Medicare Preventive Visit Patient Instructions  Thank you for completing your Welcome to Medicare Visit or Medicare Annual Wellness Visit today. Your next wellness visit will be due in one year (9/20/2024). The screening/preventive services that you may require over the next 5-10 years are detailed below. Some tests may not apply to you based off risk factors and/or age. Screening tests ordered at today's visit but not completed yet may show as past due. Also, please note that scanned in results may not display below. Preventive Screenings:  Service Recommendations Previous Testing/Comments   Colorectal Cancer Screening  · Colonoscopy    · Fecal Occult Blood Test (FOBT)/Fecal Immunochemical Test (FIT)  · Fecal DNA/Cologuard Test  · Flexible Sigmoidoscopy Age: 43-73 years old   Colonoscopy: every 10 years (May be performed more frequently if at higher risk)  OR  FOBT/FIT: every 1 year  OR  Cologuard: every 3 years  OR  Sigmoidoscopy: every 5 years  Screening may be recommended earlier than age 39 if at higher risk for colorectal cancer. Also, an individualized decision between you and your healthcare provider will decide whether screening between the ages of 77-80 would be appropriate.  Colonoscopy: Not on file  FOBT/FIT: Not on file  Cologuard: Not on file  Sigmoidoscopy: Not on file          Prostate Cancer Screening Individualized decision between patient and health care provider in men between ages of 53-66   Medicare will cover every 12 months beginning on the day after your 50th birthday PSA: No results in last 5 years     History Prostate Cancer     Hepatitis C Screening Once for adults born between 1945 and 1965  More frequently in patients at high risk for Hepatitis C Hep C Antibody: 08/03/2021    Screening Current   Diabetes Screening 1-2 times per year if you're at risk for diabetes or have pre-diabetes Fasting glucose: No results in last 5 years (No results in last 5 years)  A1C: No results in last 5 years (No results in last 5 years)      Cholesterol Screening Once every 5 years if you don't have a lipid disorder. May order more often based on risk factors. Lipid panel: Not on file         Other Preventive Screenings Covered by Medicare:  1. Abdominal Aortic Aneurysm (AAA) Screening: covered once if your at risk. You're considered to be at risk if you have a family history of AAA or a male between the age of 70-76 who smoking at least 100 cigarettes in your lifetime. 2. Lung Cancer Screening: covers low dose CT scan once per year if you meet all of the following conditions: (1) Age 48-67; (2) No signs or symptoms of lung cancer; (3) Current smoker or have quit smoking within the last 15 years; (4) You have a tobacco smoking history of at least 20 pack years (packs per day x number of years you smoked); (5) You get a written order from a healthcare provider. 3. Glaucoma Screening: covered annually if you're considered high risk: (1) You have diabetes OR (2) Family history of glaucoma OR (3)  aged 48 and older OR (3)  American aged 72 and older  3. Osteoporosis Screening: covered every 2 years if you meet one of the following conditions: (1) Have a vertebral abnormality; (2) On glucocorticoid therapy for more than 3 months; (3) Have primary hyperparathyroidism; (4) On osteoporosis medications and need to assess response to drug therapy. 5. HIV Screening: covered annually if you're between the age of 14-79. Also covered annually if you are younger than 13 and older than 72 with risk factors for HIV infection. For pregnant patients, it is covered up to 3 times per pregnancy.     Immunizations:  Immunization Recommendations   Influenza Vaccine Annual influenza vaccination during flu season is recommended for all persons aged >= 6 months who do not have contraindications   Pneumococcal Vaccine   * Pneumococcal conjugate vaccine = PCV13 (Prevnar 13), PCV15 (Vaxneuvance), PCV20 (Prevnar 20)  * Pneumococcal polysaccharide vaccine = PPSV23 (Pneumovax) Adults 2364 years old: 1-3 doses may be recommended based on certain risk factors  Adults 72 years old: 1-2 doses may be recommended based off what pneumonia vaccine you previously received   Hepatitis B Vaccine 3 dose series if at intermediate or high risk (ex: diabetes, end stage renal disease, liver disease)   Tetanus (Td) Vaccine - COST NOT COVERED BY MEDICARE PART B Following completion of primary series, a booster dose should be given every 10 years to maintain immunity against tetanus. Td may also be given as tetanus wound prophylaxis. Tdap Vaccine - COST NOT COVERED BY MEDICARE PART B Recommended at least once for all adults. For pregnant patients, recommended with each pregnancy. Shingles Vaccine (Shingrix) - COST NOT COVERED BY MEDICARE PART B  2 shot series recommended in those aged 48 and above     Health Maintenance Due:      Topic Date Due   • Colorectal Cancer Screening  Never done   • Hepatitis C Screening  Completed     Immunizations Due:      Topic Date Due   • COVID-19 Vaccine (1) Never done   • Pneumococcal Vaccine: 65+ Years (1 - PCV) Never done   • Influenza Vaccine (1) 09/01/2023     Advance Directives   What are advance directives? Advance directives are legal documents that state your wishes and plans for medical care. These plans are made ahead of time in case you lose your ability to make decisions for yourself. Advance directives can apply to any medical decision, such as the treatments you want, and if you want to donate organs. What are the types of advance directives? There are many types of advance directives, and each state has rules about how to use them. You may choose a combination of any of the following:  · Living will: This is a written record of the treatment you want. You can also choose which treatments you do not want, which to limit, and which to stop at a certain time.  This includes surgery, medicine, IV fluid, and tube feedings. · Durable power of  for healthcare [facility]): This is a written record that states who you want to make healthcare choices for you when you are unable to make them for yourself. This person, called a proxy, is usually a family member or a friend. You may choose more than 1 proxy. · Do not resuscitate (DNR) order:  A DNR order is used in case your heart stops beating or you stop breathing. It is a request not to have certain forms of treatment, such as CPR. A DNR order may be included in other types of advance directives. · Medical directive: This covers the care that you want if you are in a coma, near death, or unable to make decisions for yourself. You can list the treatments you want for each condition. Treatment may include pain medicine, surgery, blood transfusions, dialysis, IV or tube feedings, and a ventilator (breathing machine). · Values history: This document has questions about your views, beliefs, and how you feel and think about life. This information can help others choose the care that you would choose. Why are advance directives important? An advance directive helps you control your care. Although spoken wishes may be used, it is better to have your wishes written down. Spoken wishes can be misunderstood, or not followed. Treatments may be given even if you do not want them. An advance directive may make it easier for your family to make difficult choices about your care. Cigarette Smoking and Your Health   Risks to your health if you smoke:  Nicotine and other chemicals found in tobacco damage every cell in your body. Even if you are a light smoker, you have an increased risk for cancer, heart disease, and lung disease. If you are pregnant or have diabetes, smoking increases your risk for complications. Benefits to your health if you stop smoking:   · You decrease respiratory symptoms such as coughing, wheezing, and shortness of breath.    · You reduce your risk for cancers of the lung, mouth, throat, kidney, bladder, pancreas, stomach, and cervix. If you already have cancer, you increase the benefits of chemotherapy. You also reduce your risk for cancer returning or a second cancer from developing. · You reduce your risk for heart disease, blood clots, heart attack, and stroke. · You reduce your risk for lung infections, and diseases such as pneumonia, asthma, chronic bronchitis, and emphysema. · Your circulation improves. More oxygen can be delivered to your body. If you have diabetes, you lower your risk for complications, such as kidney, artery, and eye diseases. You also lower your risk for nerve damage. Nerve damage can lead to amputations, poor vision, and blindness. · You improve your body's ability to heal and to fight infections. For more information and support to stop smoking:   · [employer]  Phone: 2- 473 - [phone]  Web Address: www.[employer]  Weight Management   Why it is important to manage your weight:  Being overweight increases your risk of health conditions such as heart disease, high blood pressure, type 2 diabetes, and certain types of cancer. It can also increase your risk for osteoarthritis, sleep apnea, and other respiratory problems. Aim for a slow, steady weight loss. Even a small amount of weight loss can lower your risk of health problems. How to lose weight safely:  A safe and healthy way to lose weight is to eat fewer calories and get regular exercise. You can lose up about 1 pound a week by decreasing the number of calories you eat by 500 calories each day. Healthy meal plan for weight management:  A healthy meal plan includes a variety of foods, contains fewer calories, and helps you stay healthy. A healthy meal plan includes the following:  · Eat whole-grain foods more often. A healthy meal plan should contain fiber. Fiber is the part of grains, fruits, and vegetables that is not broken down by your body. Whole-grain foods are healthy and provide extra fiber in your diet. Some examples of whole-grain foods are whole-wheat breads and pastas, oatmeal, brown rice, and bulgur. · Eat a variety of vegetables every day. Include dark, leafy greens such as spinach, kale, alex greens, and mustard greens. Eat yellow and orange vegetables such as carrots, sweet potatoes, and winter squash. · Eat a variety of fruits every day. Choose fresh or canned fruit (canned in its own juice or light syrup) instead of juice. Fruit juice has very little or no fiber. · Eat low-fat dairy foods. Drink fat-free (skim) milk or 1% milk. Eat fat-free yogurt and low-fat cottage cheese. Try low-fat cheeses such as mozzarella and other reduced-fat cheeses. · Choose meat and other protein foods that are low in fat. Choose beans or other legumes such as split peas or lentils. Choose fish, skinless poultry (chicken or turkey), or lean cuts of red meat (beef or pork). Before you cook meat or poultry, cut off any visible fat. · Use less fat and oil. Try baking foods instead of frying them. Add less fat, such as margarine, sour cream, regular salad dressing and mayonnaise to foods. Eat fewer high-fat foods. Some examples of high-fat foods include french fries, doughnuts, ice cream, and cakes. · Eat fewer sweets. Limit foods and drinks that are high in sugar. This includes candy, cookies, regular soda, and sweetened drinks. Exercise:  Exercise at least 30 minutes per day on most days of the week. Some examples of exercise include walking, biking, dancing, and swimming. You can also fit in more physical activity by taking the stairs instead of the elevator or parking farther away from stores. Ask your healthcare provider about the best exercise plan for you. © Copyright [employer] 2018 Information is for End User's use only and may not be sold, redistributed or otherwise used for commercial purposes.  All illustrations and images included in [facility] [facility] are the copyrighted property of [employer] Inc[employer] or [address] [place] [address]

## 2023-09-20 NOTE — PROGRESS NOTES
Assessment and Plan:     Problem List Items Addressed This Visit        Cardiovascular and Mediastinum    Essential hypertension (Chronic)     Patient is stable with current anti-hypertensive medicine and continue to follow a low sodium diet and take current medication. All questions about this condition were answered today. Relevant Orders    Comprehensive metabolic panel       Genitourinary    Prostate cancer [facility])     Patient is stable  and will continue present plan of care and reassess at next routine visit. All questions about this problem from patient were answered today. Stage 3b chronic kidney disease (CKD) (HCC)     Lab Results   Component Value Date    EGFR 63 02/06/2018    EGFR 61 02/05/2018    EGFR 65 02/04/2018    CREATININE 1.18 02/06/2018    CREATININE 1.20 02/05/2018    CREATININE 1.14 02/04/2018   Pt to avoid NSAIDs and any IV dyes. Patient to follow up eoither with nephrology or  with us for  further  monitoring of  renal function. Other    Tobacco abuse (Chronic)     Patient encouraged to quit using tobacco for that increases their risk for COPD, lung cancer,stroke, oral cancer and heart disease. If patient does not want to quit they should let me know  when they are interested in quitting. There are numerous options to use to quit and we can discuss them. Other Visit Diagnoses     Well adult exam    -  Primary           Preventive health issues were discussed with patient, and age appropriate screening tests were ordered as noted in patient's After Visit Summary. Personalized health advice and appropriate referrals for health education or preventive services given if needed, as noted in patient's After Visit Summary.      History of Present Illness:     Patient presents for a Medicare Wellness Visit    HPI   Patient Care Team:  Migdalia Habermann, MD as PCP - General (Family Medicine)     Review of Systems:     Review of Systems     Problem List:     Patient Active Problem List   Diagnosis   • Gastritis   • Tobacco abuse   • Essential hypertension   • Intertrochanteric fracture of left femur (HCC)   • Prostate cancer ([address])   • Stage 3b chronic kidney disease (CKD) ([address])      Past Medical and Surgical History:     Past Medical History:   Diagnosis Date   • Cancer ([address])     PROSTATE   • Essential hypertension 2018   • Femur fracture, right ([address])    • Gastritis 10/11/2016   • Tobacco abuse 10/11/2016     Past Surgical History:   Procedure Laterality Date   • CHOLECYSTECTOMY     • FRACTURE SURGERY     • [place] OPTX FEM SHFT FX W/INSJ IMED IMPLT W/WO SCREW Left 2018    Procedure: LEFT HIP SHORT TROCHANTERIC FEMORAL NAIL;  Surgeon: Hans Spears MD;  Location: [facility];  Service: Orthopedics   • PROSTATECTOMY        Family History:     Family History   Family history unknown: Yes      Social History:     Social History     Socioeconomic History   • Marital status: /Civil Union     Spouse name: None   • Number of children: None   • Years of education: None   • Highest education level: None   Occupational History   • None   Tobacco Use   • Smoking status: Every Day     Types: Pipe   • Smokeless tobacco: Never   • Tobacco comments:     smokes pipe tabacco    Vaping Use   • Vaping Use: Never used   Substance and Sexual Activity   • Alcohol use: No   • Drug use: No   • Sexual activity: None   Other Topics Concern   • None   Social History Narrative    · Most recent tobacco use screenin2019      · Do you currently or have you served in the [address] [place] [address]:   No      · Were you activated, into active duty, as a member of the [employer] or as a Reservist:   No      · Sexual orientation:   Heterosexual      · Exercise level:   Occasional      · Diet:   Regular      · General stress level:   Medium      · Alcohol intake:   None      · Caffeine intake:   Occasional      · Guns present in home:    Yes      · Seat belts used routinely:   Yes      · Sunscreen used routinely:   No      · Smoke alarm in home:   Yes      Social Determinants of Health     Financial Resource Strain: Low Risk  (9/20/2023)    Overall Financial Resource Strain (CARDIA)    • Difficulty of Paying Living Expenses: Not hard at all   Food Insecurity: Not on file   Transportation Needs: No Transportation Needs (9/20/2023)    PRAPARE - Transportation    • Lack of Transportation (Medical): No    • Lack of Transportation (Non-Medical): No   Physical Activity: Not on file   Stress: Not on file   Social Connections: Not on file   Intimate Partner Violence: Not on file   Housing Stability: Not on file      Medications and Allergies:     Current Outpatient Medications   Medication Sig Dispense Refill   • cholecalciferol (VITAMIN D3) 1,000 units tablet Take 1,000 Units by mouth daily     • lisinopril (ZESTRIL) 40 mg tablet Take 1 tablet (40 mg total) by mouth daily 90 tablet 1     No current facility-administered medications for this visit. No Known Allergies   Immunizations: There is no immunization history on file for this patient. Health Maintenance:         Topic Date Due   • Colorectal Cancer Screening  Never done   • Hepatitis C Screening  Completed         Topic Date Due   • COVID-19 Vaccine (1) Never done   • Pneumococcal Vaccine: 65+ Years (1 - PCV) Never done   • Influenza Vaccine (1) 09/01/2023      Medicare Screening Tests and Risk Assessments: Nav is here for his Subsequent Wellness visit. Health Risk Assessment:   Patient rates overall health as good. Patient feels that their physical health rating is same. Patient is satisfied with their life. Eyesight was rated as same. Hearing was rated as same. Patient feels that their emotional and mental health rating is same. Patients states they are never, rarely angry. Patient states they are sometimes unusually tired/fatigued. Pain experienced in the last 7 days has been none.  Patient states that he has experienced no weight loss or gain in last 6 months. Depression Screening:   PHQ-2 Score: 0      Fall Risk Screening: In the past year, patient has experienced: no history of falling in past year      Home Safety:  Patient has trouble with stairs inside or outside of their home. Patient has working smoke alarms and has working carbon monoxide detector. Home safety hazards include: none. Nutrition:   Current diet is Regular. Medications:   Patient is currently taking over-the-counter supplements. OTC medications include: see medication list. Patient is able to manage medications. Activities of Daily Living (ADLs)/Instrumental Activities of Daily Living (IADLs):   Walk and transfer into and out of bed and chair?: Yes  Dress and groom yourself?: Yes    Bathe or shower yourself?: Yes    Feed yourself?  Yes  Do your laundry/housekeeping?: Yes  Manage your money, pay your bills and track your expenses?: Yes  Make your own meals?: Yes    Do your own shopping?: Yes    Previous Hospitalizations:   Any hospitalizations or ED visits within the last 12 months?: No      Advance Care Planning:   Living will: No    Durable POA for healthcare: No      PREVENTIVE SCREENINGS        Prostate Cancer Screening:    General: History Prostate Cancer      Abdominal Aortic Aneurysm (AAA) Screening:    Risk factors include: age between 70-75 yo and tobacco use        Lung Cancer Screening:     General: Screening Not Indicated      Hepatitis C Screening:    General: Screening Current    Screening, Brief Intervention, and Referral to Treatment (SBIRT)    Screening      AUDIT-C Screenin) How often did you have a drink containing alcohol in the past year? never  2) How many drinks did you have on a typical day when you were drinking in the past year? 0  3) How often did you have 6 or more drinks on one occasion in the past year? never    AUDIT-C Score: 0  Interpretation: Score 0-3 (male): Negative screen for alcohol misuse    Single Item Drug Screening:  How often have you used an illegal drug (including marijuana) or a prescription medication for non-medical reasons in the past year? never    Single Item Drug Screen Score: 0  Interpretation: Negative screen for possible drug use disorder    No results found.      Physical Exam:     /82 (BP Location: Left arm, Patient Position: Sitting, Cuff Size: Standard)   Pulse 76   Temp 97.9 °F (36.6 °C) (Skin)   Ht 5' 6" (1.676 m)   Wt 71.2 kg (157 lb)   SpO2 95%   BMI 25.34 kg/m²     Physical Exam     Uriel Rodriguez MD

## 2023-09-20 NOTE — ASSESSMENT & PLAN NOTE
Lab Results   Component Value Date    EGFR 63 02/06/2018    EGFR 61 02/05/2018    EGFR 65 02/04/2018    CREATININE 1.18 02/06/2018    CREATININE 1.20 02/05/2018    CREATININE 1.14 02/04/2018   Pt to avoid NSAIDs and any IV dyes. Patient to follow up eoither with nephrology or  with us for  further  monitoring of  renal function.

## 2023-12-13 DIAGNOSIS — I10 ESSENTIAL HYPERTENSION: Chronic | ICD-10-CM

## 2023-12-13 RX ORDER — LISINOPRIL 40 MG/1
40 TABLET ORAL DAILY
Qty: 90 TABLET | Refills: 1 | Status: SHIPPED | OUTPATIENT
Start: 2023-12-13

## 2023-12-13 NOTE — TELEPHONE ENCOUNTER
Reason for call:   [x] Refill   [] Prior Auth  [] Other:     Office:   [x] PCP/Provider - [facility]   [] Specialty/Provider -     Medication: lisinopril     Dose/Frequency: 40 mg/ daily     Quantity: 90 day supply     Pharmacy: [facility]     Does the patient have enough for 3 days?    [] Yes   [x] No - Send as HP to POD

## 2024-02-09 ENCOUNTER — VBI (OUTPATIENT)
Dept: ADMINISTRATIVE | Facility: OTHER | Age: 74
End: 2024-02-09

## 2024-03-13 ENCOUNTER — RA CDI HCC (OUTPATIENT)
Dept: OTHER | Facility: HOSPITAL | Age: 74
End: 2024-03-13

## 2024-05-31 DIAGNOSIS — I10 ESSENTIAL HYPERTENSION: Chronic | ICD-10-CM

## 2024-05-31 RX ORDER — LISINOPRIL 40 MG/1
40 TABLET ORAL DAILY
Qty: 90 TABLET | Refills: 0 | Status: SHIPPED | OUTPATIENT
Start: 2024-05-31

## 2024-06-19 DIAGNOSIS — I10 ESSENTIAL HYPERTENSION: Chronic | ICD-10-CM

## 2024-06-19 RX ORDER — LISINOPRIL 40 MG/1
40 TABLET ORAL DAILY
Qty: 90 TABLET | Refills: 1 | Status: SHIPPED | OUTPATIENT
Start: 2024-06-19

## 2024-07-08 ENCOUNTER — OFFICE VISIT (OUTPATIENT)
Dept: FAMILY MEDICINE CLINIC | Facility: CLINIC | Age: 74
End: 2024-07-08
Payer: COMMERCIAL

## 2024-07-08 VITALS
OXYGEN SATURATION: 93 % | HEIGHT: 66 IN | TEMPERATURE: 99.7 F | HEART RATE: 110 BPM | WEIGHT: 147 LBS | SYSTOLIC BLOOD PRESSURE: 120 MMHG | DIASTOLIC BLOOD PRESSURE: 80 MMHG | BODY MASS INDEX: 23.63 KG/M2

## 2024-07-08 DIAGNOSIS — L03.119 CELLULITIS AND ABSCESS OF LEG: Primary | ICD-10-CM

## 2024-07-08 DIAGNOSIS — N18.32 STAGE 3B CHRONIC KIDNEY DISEASE (CKD) (HCC): ICD-10-CM

## 2024-07-08 DIAGNOSIS — L02.419 CELLULITIS AND ABSCESS OF LEG: Primary | ICD-10-CM

## 2024-07-08 DIAGNOSIS — C61 PROSTATE CANCER (HCC): ICD-10-CM

## 2024-07-08 PROCEDURE — 99214 OFFICE O/P EST MOD 30 MIN: CPT | Performed by: FAMILY MEDICINE

## 2024-07-08 PROCEDURE — G2211 COMPLEX E/M VISIT ADD ON: HCPCS | Performed by: FAMILY MEDICINE

## 2024-07-08 RX ORDER — CEPHALEXIN 500 MG/1
500 CAPSULE ORAL EVERY 6 HOURS SCHEDULED
Qty: 40 CAPSULE | Refills: 0 | Status: SHIPPED | OUTPATIENT
Start: 2024-07-08 | End: 2024-07-18

## 2024-07-08 NOTE — PROGRESS NOTES
Ambulatory Visit  Name: Nav Infante      : 1950      MRN: [id number]  Encounter Provider: Murphy Marks MD  Encounter Date: 2024   Encounter department: [facility]    Assessment & Plan   1. Cellulitis and abscess of leg  -     cephalexin (KEFLEX) 500 mg capsule; Take 1 capsule (500 mg total) by mouth every 6 (six) hours for 10 days  -     Ambulatory Referral to Wound Care; Future  2. Prostate cancer (HCC)  3. Stage 3b chronic kidney disease (CKD) ([facility])         History of Present Illness     74-year-old male here today for checkup on multimedical poss patient with hypertension as well as an acute problem with the cellulitis of the left lower extremity.  Patient states he has had for about a year some swelling and intermittent breakage of his skin.  Patient is here with multiple ulcers in his lateral left calf with some oozing.  He has lichenified skin on both legs with the left side being with some infection and some cellulitis.  Patient's been using topical triple antibiotic and is here to get further care he is having no fever chills or sweats no signs of systemic infection but he does have cellulitis.  We will see about dressing the wound for him today concern of some Keflex 500 mg 4 times a day for at least 10 days I think the patient would definitely warrant an evaluation by the wound care team to see about getting further address treatment for his skin.  He does have a thickened skin of both of his lower extremities midway down from his calves on both sides.      Review of Systems   Constitutional:  Negative for activity change, appetite change, chills, fatigue, fever and unexpected weight change.   HENT:  Negative for congestion, ear pain, hearing loss, mouth sores, postnasal drip, sinus pressure, sinus pain, sneezing and sore throat.    Respiratory:  Negative for apnea, cough, shortness of breath and wheezing.    Cardiovascular:  Positive for leg swelling.  Negative for chest pain and palpitations.   Gastrointestinal:  Negative for abdominal pain, constipation, diarrhea, nausea and vomiting.   Endocrine: Negative for cold intolerance and heat intolerance.   Genitourinary:  Negative for dysuria, frequency and hematuria.   Musculoskeletal:  Negative for arthralgias, back pain, gait problem, joint swelling and neck pain.   Skin:  Positive for rash and wound.   Neurological:  Negative for dizziness, weakness and numbness.   Hematological:  Does not bruise/bleed easily.   Psychiatric/Behavioral:  Negative for agitation, behavioral problems, confusion, hallucinations and sleep disturbance. The patient is not nervous/anxious.      Past Medical History:   Diagnosis Date   • Cancer (HCC)     PROSTATE   • Essential hypertension 2/4/2018   • Femur fracture, right (HCC)    • Gastritis 10/11/2016   • Tobacco abuse 10/11/2016     Past Surgical History:   Procedure Laterality Date   • CHOLECYSTECTOMY     • FRACTURE SURGERY     • MN OPTX FEM SHFT FX W/INSJ IMED IMPLT W/WO SCREW Left 2/5/2018    Procedure: LEFT HIP SHORT TROCHANTERIC FEMORAL NAIL;  Surgeon: Ej Hall MD;  Location: [facility];  Service: Orthopedics   • PROSTATECTOMY  2011     Family History   Family history unknown: Yes     Social History     Tobacco Use   • Smoking status: Every Day     Current packs/day: 1.00     Average packs/day: 1 pack/day for 50.0 years (50.0 ttl pk-yrs)     Types: Cigarettes, Pipe   • Smokeless tobacco: Never   • Tobacco comments:     smokes pipe tabacco    Vaping Use   • Vaping status: Never Used   Substance and Sexual Activity   • Alcohol use: No   • Drug use: No   • Sexual activity: Not on file     Current Outpatient Medications on File Prior to Visit   Medication Sig   • cholecalciferol (VITAMIN D3) 1,000 units tablet Take 1,000 Units by mouth daily   • lisinopril (ZESTRIL) 40 mg tablet TAKE 1 TABLET (40 MG TOTAL) BY MOUTH DAILY     No Known Allergies    There is no immunization history  "on file for this patient.  Objective     /80 (BP Location: Left arm, Patient Position: Sitting, Cuff Size: Standard)   Pulse (!) 110   Temp 99.7 °F (37.6 °C) (Skin)   Ht 5' 6\" (1.676 m)   Wt 66.7 kg (147 lb)   SpO2 93%   BMI 23.73 kg/m²     Physical Exam  Constitutional:       Appearance: He is well-developed.   HENT:      Head: Normocephalic and atraumatic.      Right Ear: External ear normal.      Left Ear: External ear normal.      Nose: Nose normal.      Mouth/Throat:      Pharynx: Oropharynx is clear. No oropharyngeal exudate.   Eyes:      General: No scleral icterus.     Conjunctiva/sclera: Conjunctivae normal.      Pupils: Pupils are equal, round, and reactive to light.   Cardiovascular:      Rate and Rhythm: Normal rate and regular rhythm.      Heart sounds: Normal heart sounds.   Pulmonary:      Effort: Pulmonary effort is normal.      Breath sounds: Normal breath sounds. No wheezing or rales.   Abdominal:      General: Bowel sounds are normal.      Palpations: Abdomen is soft.      Tenderness: There is no abdominal tenderness. There is no guarding.   Musculoskeletal:         General: Normal range of motion.      Cervical back: Normal range of motion and neck supple.   Skin:     General: Skin is warm.      Coloration: Skin is cyanotic.      Findings: Erythema, rash and wound present. Rash is scaling.          Neurological:      Mental Status: He is alert and oriented to person, place, and time. Mental status is at baseline.   Psychiatric:         Mood and Affect: Mood normal.         Behavior: Behavior normal.         Thought Content: Thought content normal.         Judgment: Judgment normal.       Administrative Statements         "

## 2024-07-17 ENCOUNTER — OFFICE VISIT (OUTPATIENT)
Dept: WOUND CARE | Facility: HOSPITAL | Age: 74
End: 2024-07-17
Payer: COMMERCIAL

## 2024-07-17 VITALS
BODY MASS INDEX: 23.3 KG/M2 | HEIGHT: 66 IN | DIASTOLIC BLOOD PRESSURE: 85 MMHG | HEART RATE: 79 BPM | SYSTOLIC BLOOD PRESSURE: 157 MMHG | TEMPERATURE: 97.6 F | RESPIRATION RATE: 14 BRPM | WEIGHT: 145 LBS

## 2024-07-17 DIAGNOSIS — I87.312 CHRONIC VENOUS HYPERTENSION (IDIOPATHIC) WITH ULCER OF LEFT LOWER EXTREMITY (CODE) (HCC): ICD-10-CM

## 2024-07-17 DIAGNOSIS — L97.922 NON-PRESSURE CHRONIC ULCER OF LEFT LOWER LEG WITH FAT LAYER EXPOSED (HCC): Primary | ICD-10-CM

## 2024-07-17 DIAGNOSIS — I89.0 LYMPHEDEMA OF BOTH LOWER EXTREMITIES: ICD-10-CM

## 2024-07-17 PROCEDURE — 97597 DBRDMT OPN WND 1ST 20 CM/<: CPT | Performed by: STUDENT IN AN ORGANIZED HEALTH CARE EDUCATION/TRAINING PROGRAM

## 2024-07-17 PROCEDURE — 99213 OFFICE O/P EST LOW 20 MIN: CPT | Performed by: STUDENT IN AN ORGANIZED HEALTH CARE EDUCATION/TRAINING PROGRAM

## 2024-07-17 PROCEDURE — 99204 OFFICE O/P NEW MOD 45 MIN: CPT | Performed by: STUDENT IN AN ORGANIZED HEALTH CARE EDUCATION/TRAINING PROGRAM

## 2024-07-17 RX ORDER — LIDOCAINE HYDROCHLORIDE 40 MG/ML
5 SOLUTION TOPICAL ONCE
Status: COMPLETED | OUTPATIENT
Start: 2024-07-17 | End: 2024-07-17

## 2024-07-17 RX ADMIN — LIDOCAINE HYDROCHLORIDE 5 ML: 40 SOLUTION TOPICAL at 10:31

## 2024-07-17 NOTE — PROGRESS NOTES
Wound Procedure Treatment Venous Ulcer Left;Lower;Lateral Leg    Performed by: Nuzhat Gee RN  Authorized by: Janessa Juarez MD    Associated wounds:   Wound 07/17/24 Venous Ulcer Leg Left;Lower;Lateral  Wound cleansed with:  NSS  Applied to periwound:  Moisture lotion  Applied primary dressing:  Polymem foam and Silver  Applied secondary dressing:  ABD  Dressing secured with:  Romeo, Tape, Elastic tubular stocking and Size F

## 2024-07-17 NOTE — PATIENT INSTRUCTIONS
Orders Placed This Encounter   Procedures    Wound cleansing and dressings Venous Ulcer Left;Lower;Lateral Leg     LEFT LEG WOUND:    Wash your hands with soap and water.  Remove old dressing, discard into plastic bag and place in trash.  Cleanse the wound with soap and water prior to applying a clean dressing. Do not use tissue or cotton balls. Do not scrub the wound. Pat dry using gauze.  Shower yes.    Apply moisturizer to skin surrounding wound  Apply Polymem Max AG to the leg wound.  Cover with ABD  Secure with rolled gauze and tape.  Change dressing every other day.      Elastic Tubular Stocking: Spandagrip F to left lower leg    Tubular elastic bandage: Apply from base of toes to behind the knee. Apply in AM, may remove for sleep.    Avoid prolonged standing in one place.    Elevate leg(s) above the level of the heart when sitting or as much as possible.                 Protein: Eat protein with each meal to promote healing.  Examples of protein are fish, meat, chicken, nuts, peanut butter, eggs, lentils, edamame or a protein shake.    Wound infection:  If you have signs of infection please call the wound center.  If the wound center is closed- please go to the Emergency department.  Some signs of infection:  fever, chills, increased redness, red streaks, increase in pain, increased drainage.  Drainage with an odor, Change in drainage color: white/milky/green/tan/yellow,  an increase in swelling, chest pain and/or shortness of breath.     Standing Status:   Future     Standing Expiration Date:   7/24/2024

## 2024-07-17 NOTE — PROGRESS NOTES
"Patient ID: Nav Infante is a 74 y.o. male Date of Birth 1950     Chief Complaint  Chief Complaint   Patient presents with    New Patient Visit     LLE       Allergies  Patient has no known allergies.    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  Risks and benefits: risks, benefits and alternatives were discussed  Consent given by: patient  Time out: Immediately prior to procedure a \"time out\" was called to verify the correct patient, procedure, equipment, support staff and site/side marked as required.  Patient identity confirmed: verbally with patient    Debridement Details  Performed by: physician  Debridement type: selective  Pain control: lidocaine 4%      Post-debridement measurements  Length (cm): 6  Width (cm): 2  Depth (cm): 0.1  Percent debrided: 70%  Surface Area (cm^2): 12  Area Debrided (cm^2): 8.4  Volume (cm^3): 1.2    Devitalized tissue debrided: biofilm, exudate, fibrin and slough  Instrument(s) utilized: curette  Bleeding: small  Hemostasis obtained with: pressure  Procedural pain (0-10): 1  Post-procedural pain: 0   Response to treatment: procedure was tolerated well        Plan:   It was a pleasure to see Nav Infante for wound care consult today  Selective debridement performed today as above  Start plan of care as noted below with polymem max ag, spandigrip  Complete course of antibiotics prescribed by " PCP.  Next week may consider Coflex light for compression.  No signs or symptoms of infection today. Patient understands that if any signs of infection start (such as increased redness, drainage, pain, fever, chills, diaphoresis), they should call our office or proceed to the ER or Urgent Care.  Patient should continue a high protein diet to facilitate wound healing  Patient is advised to not submerge wound or leave wound open to air.  Follow up in 1 weeks  Given the multi-factorial nature of wound care, additional time was taken to review patient's treatment plan with other specialties and most recent pertinent lab work and imaging.   All plans of care discussed with patient at bedside who verbalized understanding with treatment plan.    Wound 07/17/24 Venous Ulcer Leg Left;Lower;Lateral (Active)   Wound Image Images linked 07/17/24 1028   Wound Description Pink;Yellow;White;Epithelialization 07/17/24 1027   Marylin-wound Assessment Maceration;Dry;Scaly;Edema 07/17/24 1027   Wound Length (cm) 6 cm 07/17/24 1027   Wound Width (cm) 2 cm 07/17/24 1027   Wound Depth (cm) 0.1 cm 07/17/24 1027   Wound Surface Area (cm^2) 12 cm^2 07/17/24 1027   Wound Volume (cm^3) 1.2 cm^3 07/17/24 1027   Calculated Wound Volume (cm^3) 1.2 cm^3 07/17/24 1027   Drainage Amount Moderate 07/17/24 1027   Drainage Description Yellow 07/17/24 1027   Non-staged Wound Description Full thickness 07/17/24 1027   Dressing Status Intact (upon arrival) 07/17/24 1027       Wound 07/17/24 Venous Ulcer Leg Left;Lower;Lateral (Active)   Date First Assessed/Time First Assessed: 07/17/24 1025   Primary Wound Type: Venous Ulcer  Location: Leg  Wound Location Orientation: Left;Lower;Lateral       [REMOVED] Wound 02/04/18 Abrasion(s) Hand Left (Removed)   Resolved Date: 07/17/24  Date First Assessed/Time First Assessed: 02/04/18 1648   Traumatic Wound Type: Abrasion(s)  Location: Hand  Wound Location Orientation: Left  Dressing Status: (c) Other (Comment)        [REMOVED] Incision 02/05/18 Hip Left (Removed)   Resolved Date: 07/17/24  Date First Assessed/Time First Assessed: 02/05/18 1928   Location: Hip  Wound Location Orientation: Left  Wound Description (Comments):  staples  Wound Outcome: (c) Other (Comment)       [REMOVED] Incision 02/05/18 Thigh Left (Removed)   Resolved Date: 07/17/24  Date First Assessed/Time First Assessed: 02/05/18 1940   Location: Thigh  Wound Location Orientation: Left  Wound Description (Comments):  staples  Wound Outcome: (c) Other (Comment)       Subjective:      .    7/17/24: Consult - Nav is a pleasant 74-year-old male with a past medical history of hypertension, stage IIIb CKD, tobacco abuse here today for wound care consult.  Patient is referred by PCP Murphy Marks.  Nav was last seen at PCPs office on 7/8 at which time patient presented with acute concern of wound of left lower extremity.  He had noticed some swelling and intermittent breakdown the skin for approximately a year that with recently worsening.  His family happened to notice blood and drainage on his sock and asked to have him seen by PCP.  He was using triple antibiotic on the wound.  PCP recommended 10-day course of Keflex and referred patient to wound management.  Notes that he has had on and off swelling for a very long time however does not use any sort of compression.  Continues to smoke cigarettes and states that sometimes he smokes too much and sometimes only a little.  No symptoms of infection today including fever chills diaphoresis.        The following portions of the patient's history were reviewed and updated as appropriate: allergies, current medications, past family history, past medical history, past social history, past surgical history, and problem list.    Review of Systems   Constitutional:  Negative for chills, diaphoresis and fever.   Skin:  Positive for wound.   All other systems reviewed and are negative.        Objective:       Wound  "07/17/24 Venous Ulcer Leg Left;Lower;Lateral (Active)   Wound Image Images linked 07/17/24 1028   Wound Description Pink;Yellow;White;Epithelialization 07/17/24 1027   Marylin-wound Assessment Maceration;Dry;Scaly;Edema 07/17/24 1027   Wound Length (cm) 6 cm 07/17/24 1027   Wound Width (cm) 2 cm 07/17/24 1027   Wound Depth (cm) 0.1 cm 07/17/24 1027   Wound Surface Area (cm^2) 12 cm^2 07/17/24 1027   Wound Volume (cm^3) 1.2 cm^3 07/17/24 1027   Calculated Wound Volume (cm^3) 1.2 cm^3 07/17/24 1027   Drainage Amount Moderate 07/17/24 1027   Drainage Description Yellow 07/17/24 1027   Non-staged Wound Description Full thickness 07/17/24 1027   Dressing Status Intact (upon arrival) 07/17/24 1027       /85   Pulse 79   Temp 97.6 °F (36.4 °C)   Resp 14   Ht 5' 6\" (1.676 m)   Wt 65.8 kg (145 lb)   BMI 23.40 kg/m²     Physical Exam  Vitals reviewed.   Constitutional:       Appearance: Normal appearance.   HENT:      Head: Normocephalic and atraumatic.   Eyes:      Extraocular Movements: Extraocular movements intact.   Pulmonary:      Effort: Pulmonary effort is normal.   Musculoskeletal:      Cervical back: Neck supple.      Right lower leg: Edema present.      Left lower leg: Edema present.   Skin:     Comments: Hyperpigmentation, hyperplasia, lipodermatosclerosis of bilateral lower extremities consistent with chronic venous stasis disease.  Circumferentially left lower extremity has several discrete distal wound openings.  Serosanguineous exudate.  Full-thickness.  No overt signs of infection.   Neurological:      Mental Status: He is alert.   Psychiatric:         Mood and Affect: Mood normal.           Wound Instructions:  Orders Placed This Encounter   Procedures    Wound cleansing and dressings Venous Ulcer Left;Lower;Lateral Leg     LEFT LEG WOUND:    Wash your hands with soap and water.  Remove old dressing, discard into plastic bag and place in trash.  Cleanse the wound with soap and water prior to applying " a clean dressing. Do not use tissue or cotton balls. Do not scrub the wound. Pat dry using gauze.  Shower yes.    Apply moisturizer to skin surrounding wound  Apply Polymem Max AG to the leg wound.  Cover with ABD  Secure with rolled gauze and tape.  Change dressing every other day.      Elastic Tubular Stocking: Spandagrip F to left lower leg    Tubular elastic bandage: Apply from base of toes to behind the knee. Apply in AM, may remove for sleep.    Avoid prolonged standing in one place.    Elevate leg(s) above the level of the heart when sitting or as much as possible.                 Protein: Eat protein with each meal to promote healing.  Examples of protein are fish, meat, chicken, nuts, peanut butter, eggs, lentils, edamame or a protein shake.    Wound infection:  If you have signs of infection please call the wound center.  If the wound center is closed- please go to the Emergency department.  Some signs of infection:  fever, chills, increased redness, red streaks, increase in pain, increased drainage.  Drainage with an odor, Change in drainage color: white/milky/green/tan/yellow,  an increase in swelling, chest pain and/or shortness of breath.     Standing Status:   Future     Standing Expiration Date:   7/24/2024    Wound Procedure Treatment Venous Ulcer Left;Lower;Lateral Leg     This order was created via procedure documentation    Debridement     This order was created via procedure documentation        Diagnosis ICD-10-CM Associated Orders   1. Non-pressure chronic ulcer of left lower leg with fat layer exposed ([facility])  L97.922 lidocaine (XYLOCAINE) 4 % topical solution 5 mL     Wound cleansing and dressings Venous Ulcer Left;Lower;Lateral Leg     Wound Procedure Treatment Venous Ulcer Left;Lower;Lateral Leg     Debridement      2. Chronic venous hypertension (idiopathic) with ulcer of left lower extremity (CODE) ([facility])  I87.312       3. Lymphedema of both lower extremities  I89.0           --  [place]  "MD Larry    \"This note has been constructed using a voice recognition system. Therefore there may be syntax, spelling, and/or grammatical errors. Occasional wrong word or \"sound alike\" substitutions may have occurred due to the inherent limitations of voice recognition software. Read the chart carefully and recognize, using context, where substitutions have occurred. Please call if you have any questions.\"     "

## 2024-07-25 ENCOUNTER — OFFICE VISIT (OUTPATIENT)
Dept: WOUND CARE | Facility: HOSPITAL | Age: 74
End: 2024-07-25
Payer: COMMERCIAL

## 2024-07-25 VITALS — DIASTOLIC BLOOD PRESSURE: 92 MMHG | SYSTOLIC BLOOD PRESSURE: 155 MMHG | RESPIRATION RATE: 18 BRPM | HEART RATE: 79 BPM

## 2024-07-25 DIAGNOSIS — L97.922 NON-PRESSURE CHRONIC ULCER OF LEFT LOWER LEG WITH FAT LAYER EXPOSED (HCC): Primary | ICD-10-CM

## 2024-07-25 DIAGNOSIS — I87.312 CHRONIC VENOUS HYPERTENSION (IDIOPATHIC) WITH ULCER OF LEFT LOWER EXTREMITY (CODE) (HCC): ICD-10-CM

## 2024-07-25 DIAGNOSIS — I89.0 LYMPHEDEMA OF BOTH LOWER EXTREMITIES: ICD-10-CM

## 2024-07-25 DIAGNOSIS — L85.3 XEROSIS CUTIS: ICD-10-CM

## 2024-07-25 PROCEDURE — 99212 OFFICE O/P EST SF 10 MIN: CPT | Performed by: STUDENT IN AN ORGANIZED HEALTH CARE EDUCATION/TRAINING PROGRAM

## 2024-07-25 PROCEDURE — 99213 OFFICE O/P EST LOW 20 MIN: CPT | Performed by: STUDENT IN AN ORGANIZED HEALTH CARE EDUCATION/TRAINING PROGRAM

## 2024-07-25 RX ORDER — AMMONIUM LACTATE 12 G/100G
CREAM TOPICAL AS NEEDED
Qty: 385 G | Refills: 1 | Status: SHIPPED | OUTPATIENT
Start: 2024-07-25

## 2024-07-25 NOTE — PATIENT INSTRUCTIONS
Orders Placed This Encounter   Procedures    Wound cleansing and dressings Venous Ulcer Left;Lower;Lateral Leg     Left leg wounds are healed today.   May leave open to air.   Continue to cleanse with mild soap and water.   Apply am-lactin/lac-hydrin daily (prescription sent to pharmacy)  Continue to wear compression and elevate extremities daily    Thank you for using the wound center.     Standing Status:   Future     Standing Expiration Date:   8/1/2024

## 2024-07-25 NOTE — PROGRESS NOTES
Patient ID: Nav Infante is a 74 y.o. male Date of Birth 1950     Chief Complaint  Chief Complaint   Patient presents with    Follow Up Wound Care Visit     LLE wounds       Allergies  Patient has no known allergies.    Assessment:     Diagnoses and all orders for this visit:    Non-pressure chronic ulcer of left lower leg with fat layer exposed (HCC)  -     Wound cleansing and dressings Venous Ulcer Left;Lower;Lateral Leg; Future    Chronic venous hypertension (idiopathic) with ulcer of left lower extremity (CODE) (HCC)  -     Wound cleansing and dressings Venous Ulcer Left;Lower;Lateral Leg; Future    Lymphedema of both lower extremities  -     Wound cleansing and dressings Venous Ulcer Left;Lower;Lateral Leg; Future    Xerosis cutis            Plan:   It was a pleasure to see Nav Infante for wound care follow up today  Wound is fully epithelialized today.  Patient advised to pick newly healed skin with moisturization and edema control.  Discharge from wound management today with follow-up as needed in the future.  All plans of care discussed with patient at bedside who verbalized understanding with treatment plan.    Wound 07/17/24 Venous Ulcer Leg Left;Lower;Lateral (Active)   Wound Image Images linked 07/25/24 1529       Wound 07/17/24 Venous Ulcer Leg Left;Lower;Lateral (Active)   Date First Assessed/Time First Assessed: 07/17/24 1025   Primary Wound Type: Venous Ulcer  Location: Leg  Wound Location Orientation: Left;Lower;Lateral       [REMOVED] Wound 02/04/18 Abrasion(s) Hand Left (Removed)   Resolved Date: 07/17/24  Date First Assessed/Time First Assessed: 02/04/18 1648   Traumatic Wound Type: Abrasion(s)  Location: Hand  Wound Location Orientation: Left  Dressing Status: (c) Other (Comment)       [REMOVED] Incision 02/05/18 Hip Left (Removed)   Resolved Date: 07/17/24  Date First Assessed/Time First Assessed: 02/05/18 1928   Location: Hip  Wound Location Orientation: Left  Wound Description  (Comments):  staples  Wound Outcome: (c) Other (Comment)       [REMOVED] Incision 02/05/18 Thigh Left (Removed)   Resolved Date: 07/17/24  Date First Assessed/Time First Assessed: 02/05/18 1940   Location: Thigh  Wound Location Orientation: Left  Wound Description (Comments):  staples  Wound Outcome: (c) Other (Comment)       Subjective:      .    7/25/24: Patient using PolyMem as directed.  Happy with wound healing.  No symptoms of infection.    7/17/24: Consult - Nav is a pleasant 74-year-old male with a past medical history of hypertension, stage IIIb CKD, tobacco abuse here today for wound care consult.  Patient is referred by PCP Murphy Marks.  Nav was last seen at PCPs office on 7/8 at which time patient presented with acute concern of wound of left lower extremity.  He had noticed some swelling and intermittent breakdown the skin for approximately a year that with recently worsening.  His family happened to notice blood and drainage on his sock and asked to have him seen by PCP.  He was using triple antibiotic on the wound.  PCP recommended 10-day course of Keflex and referred patient to wound management.  Notes that he has had on and off swelling for a very long time however does not use any sort of compression.  Continues to smoke cigarettes and states that sometimes he smokes too much and sometimes only a little.  No symptoms of infection today including fever chills diaphoresis.        The following portions of the patient's history were reviewed and updated as appropriate: allergies, current medications, past family history, past medical history, past social history, past surgical history, and problem list.    Review of Systems   Constitutional:  Negative for chills, diaphoresis and fever.   Skin:  Positive for wound.   All other systems reviewed and are negative.        Objective:       Wound 07/17/24 Venous Ulcer Leg Left;Lower;Lateral (Active)   Wound Image Images linked 07/25/24 1529       BP  "155/92   Pulse 79   Resp 18     Physical Exam  Vitals reviewed.   Constitutional:       Appearance: Normal appearance.   HENT:      Head: Normocephalic and atraumatic.   Eyes:      Extraocular Movements: Extraocular movements intact.   Pulmonary:      Effort: Pulmonary effort is normal.   Musculoskeletal:      Cervical back: Neck supple.      Right lower leg: Edema present.      Left lower leg: Edema present.   Skin:     Comments: Lateral left lower extremity wound is fully epithelialized today.  No open wound or exudate   Neurological:      Mental Status: He is alert.   Psychiatric:         Mood and Affect: Mood normal.           Wound Instructions:  Orders Placed This Encounter   Procedures    Wound cleansing and dressings Venous Ulcer Left;Lower;Lateral Leg     Left leg wounds are healed today.   May leave open to air.   Continue to cleanse with mild soap and water.   Apply am-lactin/lac-hydrin daily (prescription sent to pharmacy)  Continue to wear compression and elevate extremities daily    Thank you for using the wound center.     Standing Status:   Future     Standing Expiration Date:   8/1/2024        Diagnosis ICD-10-CM Associated Orders   1. Non-pressure chronic ulcer of left lower leg with fat layer exposed ([facility])  L97.922 Wound cleansing and dressings Venous Ulcer Left;Lower;Lateral Leg      2. Chronic venous hypertension (idiopathic) with ulcer of left lower extremity (CODE) ([facility])  I87.312 Wound cleansing and dressings Venous Ulcer Left;Lower;Lateral Leg      3. Lymphedema of both lower extremities  I89.0 Wound cleansing and dressings Venous Ulcer Left;Lower;Lateral Leg      4. Xerosis cutis  L85.3           --  Janessa Juarez MD    \"This note has been constructed using a voice recognition system. Therefore there may be syntax, spelling, and/or grammatical errors. Occasional wrong word or \"sound alike\" substitutions may have occurred due to the inherent limitations of voice recognition software. Read " "the chart carefully and recognize, using context, where substitutions have occurred. Please call if you have any questions.\"     "

## 2024-08-07 DIAGNOSIS — I10 ESSENTIAL HYPERTENSION: Chronic | ICD-10-CM

## 2024-08-07 RX ORDER — LISINOPRIL 40 MG/1
40 TABLET ORAL DAILY
Qty: 90 TABLET | Refills: 1 | Status: SHIPPED | OUTPATIENT
Start: 2024-08-07

## 2024-08-08 ENCOUNTER — RA CDI HCC (OUTPATIENT)
Dept: OTHER | Facility: HOSPITAL | Age: 74
End: 2024-08-08

## 2024-08-19 ENCOUNTER — OFFICE VISIT (OUTPATIENT)
Dept: FAMILY MEDICINE CLINIC | Facility: CLINIC | Age: 74
End: 2024-08-19
Payer: COMMERCIAL

## 2024-08-19 VITALS
TEMPERATURE: 98.4 F | HEIGHT: 66 IN | HEART RATE: 98 BPM | SYSTOLIC BLOOD PRESSURE: 122 MMHG | WEIGHT: 147 LBS | DIASTOLIC BLOOD PRESSURE: 76 MMHG | RESPIRATION RATE: 18 BRPM | OXYGEN SATURATION: 93 % | BODY MASS INDEX: 23.63 KG/M2

## 2024-08-19 DIAGNOSIS — I10 ESSENTIAL HYPERTENSION: Chronic | ICD-10-CM

## 2024-08-19 DIAGNOSIS — C61 PROSTATE CANCER (HCC): ICD-10-CM

## 2024-08-19 DIAGNOSIS — N18.32 STAGE 3B CHRONIC KIDNEY DISEASE (CKD) (HCC): ICD-10-CM

## 2024-08-19 DIAGNOSIS — Z72.0 TOBACCO ABUSE: Primary | Chronic | ICD-10-CM

## 2024-08-19 PROCEDURE — 99214 OFFICE O/P EST MOD 30 MIN: CPT | Performed by: FAMILY MEDICINE

## 2024-08-19 PROCEDURE — G2211 COMPLEX E/M VISIT ADD ON: HCPCS | Performed by: FAMILY MEDICINE

## 2024-08-19 NOTE — ASSESSMENT & PLAN NOTE
Lab Results   Component Value Date    EGFR 43 (L) 09/12/2023    EGFR 63 02/06/2018    EGFR 61 02/05/2018    CREATININE 1.65 (H) 09/12/2023    CREATININE 1.18 02/06/2018    CREATININE 1.20 02/05/2018   Pt to avoid NSAIDs and any IV dyes. Patient to follow up eoither with nephrology or  with us for  further  monitoring of  renal function.

## 2024-08-19 NOTE — PROGRESS NOTES
Ambulatory Visit  Name: Nav Infante      : 1950      MRN: [id number]  Encounter Provider: Murphy Marks MD  Encounter Date: 2024   Encounter department: [facility]    Assessment & Plan   1. Tobacco abuse  Assessment & Plan:  Patient encouraged to quit using tobacco for that increases their risk for COPD, lung cancer,stroke, oral cancer and heart disease. If patient does not want to quit they should let me know  when they are interested in quitting. There are numerous options to use to quit and we can discuss them.   2. Stage 3b chronic kidney disease (CKD) (HCC)  Assessment & Plan:  Lab Results   Component Value Date    EGFR 43 (L) 2023    EGFR 63 2018    EGFR 61 2018    CREATININE 1.65 (H) 2023    CREATININE 1.18 2018    CREATININE 1.20 2018   Pt to avoid NSAIDs and any IV dyes. Patient to follow up eoither with nephrology or  with us for  further  monitoring of  renal function.   3. Prostate cancer (HCC)  4. Essential hypertension  Assessment & Plan:  Patient is stable with current anti-hypertensive medicine and continue to follow a low sodium diet and take current medication. All questions about this condition were answered today.          History of Present Illness     This is a 74-year-old male here today for checkup for multimedical problems as well as a checkup on his wound in his left leg.  Patient was seeing wound care and has been getting treatments for a wound in his left leg which was seen her last visit.  Patient's wound is healed up he is doing much better he also has hypertension as well as history of tobacco abuse discussed with patient need to quit smoking his blood pressure is well-controlled will see patient back in approximately 6 months.  Had discussion with patient about keeping his legs elevated as well as wearing compression stockings to minimize recurrence of wounds in his legs.  Patient was asking about  possibly taking a drug holiday from his compression stockings.  Discussed with him he might want to try this but if he gets swelling in his legs to try and get them back on to prevent from getting wound breakdown.  I think patient is going to keep wearing the stockings.      Review of Systems   Constitutional:  Negative for activity change, appetite change, chills, fatigue, fever and unexpected weight change.   HENT:  Negative for congestion, ear pain, hearing loss, mouth sores, postnasal drip, sinus pressure, sinus pain, sneezing and sore throat.    Respiratory:  Negative for apnea, cough, shortness of breath and wheezing.    Cardiovascular:  Positive for leg swelling. Negative for chest pain and palpitations.   Gastrointestinal:  Negative for abdominal pain, constipation, diarrhea, nausea and vomiting.   Endocrine: Negative for cold intolerance and heat intolerance.   Genitourinary:  Negative for dysuria, frequency and hematuria.   Musculoskeletal:  Negative for arthralgias, back pain, gait problem, joint swelling and neck pain.   Skin:  Positive for color change. Negative for rash.   Neurological:  Negative for dizziness, weakness and numbness.   Hematological:  Does not bruise/bleed easily.   Psychiatric/Behavioral:  Negative for agitation, behavioral problems, confusion, hallucinations and sleep disturbance. The patient is not nervous/anxious.      Past Medical History:   Diagnosis Date   • Cancer (HCC)     PROSTATE   • Essential hypertension 2/4/2018   • Femur fracture, right (HCC)    • Gastritis 10/11/2016   • Tobacco abuse 10/11/2016     Past Surgical History:   Procedure Laterality Date   • CHOLECYSTECTOMY     • FRACTURE SURGERY     • TN OPTX FEM SHFT FX W/INSJ IMED IMPLT W/WO SCREW Left 2/5/2018    Procedure: LEFT HIP SHORT TROCHANTERIC FEMORAL NAIL;  Surgeon: Ej Hall MD;  Location: [facility];  Service: Orthopedics   • PROSTATECTOMY  2011     Family History   Problem Relation Age of Onset   • Stroke  "Mother    • Stroke Father    • Hypertension Father      Social History     Tobacco Use   • Smoking status: Every Day     Current packs/day: 1.00     Average packs/day: 1 pack/day for 50.0 years (50.0 ttl pk-yrs)     Types: Cigarettes, Pipe   • Smokeless tobacco: Never   • Tobacco comments:     smokes pipe tabacco    Vaping Use   • Vaping status: Never Used   Substance and Sexual Activity   • Alcohol use: No   • Drug use: No   • Sexual activity: Not on file     Current Outpatient Medications on File Prior to Visit   Medication Sig   • ammonium lactate (LAC-HYDRIN) 12 % cream Apply topically as needed for dry skin   • cholecalciferol (VITAMIN D3) 1,000 units tablet Take 1,000 Units by mouth daily   • lisinopril (ZESTRIL) 40 mg tablet take 1 tablet by mouth once daily     No Known Allergies    There is no immunization history on file for this patient.  Objective     /76 (BP Location: Left arm, Patient Position: Sitting, Cuff Size: Standard)   Pulse 98   Temp 98.4 °F (36.9 °C) (Temporal)   Resp 18   Ht 5' 6\" (1.676 m)   Wt 66.7 kg (147 lb)   SpO2 93%   BMI 23.73 kg/m²     Physical Exam  Skin:     Coloration: Skin is cyanotic.                "

## 2024-09-12 DIAGNOSIS — I10 ESSENTIAL HYPERTENSION: Chronic | ICD-10-CM

## 2024-09-12 RX ORDER — LISINOPRIL 40 MG/1
40 TABLET ORAL DAILY
Qty: 90 TABLET | Refills: 0 | Status: SHIPPED | OUTPATIENT
Start: 2024-09-12

## 2025-02-07 ENCOUNTER — RA CDI HCC (OUTPATIENT)
Dept: OTHER | Facility: HOSPITAL | Age: 75
End: 2025-02-07

## 2025-02-28 ENCOUNTER — OFFICE VISIT (OUTPATIENT)
Dept: FAMILY MEDICINE CLINIC | Facility: CLINIC | Age: 75
End: 2025-02-28
Payer: COMMERCIAL

## 2025-02-28 VITALS
HEART RATE: 70 BPM | WEIGHT: 154 LBS | HEIGHT: 66 IN | DIASTOLIC BLOOD PRESSURE: 84 MMHG | TEMPERATURE: 98.1 F | BODY MASS INDEX: 24.75 KG/M2 | SYSTOLIC BLOOD PRESSURE: 138 MMHG | OXYGEN SATURATION: 95 %

## 2025-02-28 DIAGNOSIS — Z13.220 SCREENING CHOLESTEROL LEVEL: ICD-10-CM

## 2025-02-28 DIAGNOSIS — C61 PROSTATE CANCER (HCC): ICD-10-CM

## 2025-02-28 DIAGNOSIS — I10 ESSENTIAL HYPERTENSION: Primary | Chronic | ICD-10-CM

## 2025-02-28 DIAGNOSIS — N18.32 STAGE 3B CHRONIC KIDNEY DISEASE (CKD) (HCC): ICD-10-CM

## 2025-02-28 PROCEDURE — G0439 PPPS, SUBSEQ VISIT: HCPCS | Performed by: FAMILY MEDICINE

## 2025-02-28 PROCEDURE — G2211 COMPLEX E/M VISIT ADD ON: HCPCS | Performed by: FAMILY MEDICINE

## 2025-02-28 PROCEDURE — 99214 OFFICE O/P EST MOD 30 MIN: CPT | Performed by: FAMILY MEDICINE

## 2025-02-28 RX ORDER — LISINOPRIL 40 MG/1
40 TABLET ORAL DAILY
Qty: 90 TABLET | Refills: 1 | Status: SHIPPED | OUTPATIENT
Start: 2025-02-28

## 2025-02-28 NOTE — ASSESSMENT & PLAN NOTE
Patient is stable with current anti-hypertensive medicine and continue to follow a low sodium diet and take current medication. All questions about this condition were answered today.   Orders:  •  Comprehensive metabolic panel; Future  •  CBC and differential; Future  •  TSH, 3rd generation with Free T4 reflex; Future  •  Magnesium; Future  •  Uric acid; Future  •  UA/M w/rflx Culture, Comp  •  lisinopril (ZESTRIL) 40 mg tablet; Take 1 tablet (40 mg total) by mouth daily

## 2025-02-28 NOTE — ASSESSMENT & PLAN NOTE
Lab Results   Component Value Date    EGFR 43 (L) 09/12/2023    EGFR 63 02/06/2018    EGFR 61 02/05/2018    CREATININE 1.65 (H) 09/12/2023    CREATININE 1.18 02/06/2018    CREATININE 1.20 02/05/2018

## 2025-02-28 NOTE — PROGRESS NOTES
Name: Nav Infante      : 1950      MRN: [id number]  Encounter Provider: Murphy Marks MD  Encounter Date: 2025   Encounter department: [facility]  :  Assessment & Plan  Essential hypertension  Patient is stable with current anti-hypertensive medicine and continue to follow a low sodium diet and take current medication. All questions about this condition were answered today.   Orders:  •  Comprehensive metabolic panel; Future  •  CBC and differential; Future  •  TSH, 3rd generation with Free T4 reflex; Future  •  Magnesium; Future  •  Uric acid; Future  •  UA/M w/rflx Culture, Comp  •  lisinopril (ZESTRIL) 40 mg tablet; Take 1 tablet (40 mg total) by mouth daily    Prostate cancer (HCC)  Patient is stable  and will continue present plan of care and reassess at next routine visit. All questions about this problem from patient were answered today.        Screening cholesterol level    Orders:  •  Lipid Panel with Direct LDL reflex; Future    Stage 3b chronic kidney disease (CKD) (HCC)  Lab Results   Component Value Date    EGFR 43 (L) 2023    EGFR 63 2018    EGFR 61 2018    CREATININE 1.65 (H) 2023    CREATININE 1.18 2018    CREATININE 1.20 2018                 History of Present Illness   75-year-old male here today for checkup for Medicare wellness and checkup on hypertension and is doing well with that.  Patient      Review of Systems   Constitutional:  Negative for activity change, appetite change, chills, fatigue, fever and unexpected weight change.   HENT:  Negative for congestion, ear pain, hearing loss, mouth sores, postnasal drip, sinus pressure, sinus pain, sneezing and sore throat.    Respiratory:  Negative for apnea, cough, shortness of breath and wheezing.    Cardiovascular:  Negative for chest pain, palpitations and leg swelling.   Gastrointestinal:  Negative for abdominal pain, constipation, diarrhea, nausea and  "vomiting.   Endocrine: Negative for cold intolerance and heat intolerance.   Genitourinary:  Negative for dysuria, frequency and hematuria.   Musculoskeletal:  Negative for arthralgias, back pain, gait problem, joint swelling and neck pain.   Skin:  Negative for rash.   Neurological:  Negative for dizziness, weakness and numbness.   Hematological:  Does not bruise/bleed easily.   Psychiatric/Behavioral:  Negative for agitation, behavioral problems, confusion, hallucinations and sleep disturbance. The patient is not nervous/anxious.        Objective   /84 (BP Location: Left arm, Patient Position: Sitting, Cuff Size: Large)   Pulse 70   Temp 98.1 °F (36.7 °C) (Skin)   Ht 5' 6\" (1.676 m)   Wt 69.9 kg (154 lb)   SpO2 95%   BMI 24.86 kg/m²      Physical Exam  Constitutional:       Appearance: He is well-developed.   HENT:      Head: Normocephalic and atraumatic.      Right Ear: External ear normal.      Left Ear: External ear normal.      Nose: Nose normal.      Mouth/Throat:      Pharynx: Oropharynx is clear. No oropharyngeal exudate.   Eyes:      General: No scleral icterus.     Conjunctiva/sclera: Conjunctivae normal.      Pupils: Pupils are equal, round, and reactive to light.   Cardiovascular:      Rate and Rhythm: Normal rate and regular rhythm.      Heart sounds: Normal heart sounds.   Pulmonary:      Effort: Pulmonary effort is normal.      Breath sounds: Normal breath sounds. No wheezing or rales.   Abdominal:      General: Bowel sounds are normal.      Palpations: Abdomen is soft.      Tenderness: There is no abdominal tenderness. There is no guarding.   Musculoskeletal:         General: Normal range of motion.      Cervical back: Normal range of motion and neck supple.   Skin:     General: Skin is warm and dry.      Findings: No erythema or rash.   Neurological:      Mental Status: He is alert and oriented to person, place, and time. Mental status is at baseline.   Psychiatric:         Mood and " Affect: Mood normal.         Behavior: Behavior normal.         Thought Content: Thought content normal.         Judgment: Judgment normal.         Answers submitted by the patient for this visit:  Medicare Annual Wellness Visit (Submitted on 2/26/2025)  How would you rate your overall health?: good  Compared to last year, how is your physical health?: same  In general, how satisfied are you with your life?: satisfied  Compared to last year, how is your eyesight?: same  Compared to last year, how is your hearing?: same  Compared to last year, how is your emotional/mental health?: same  How often is anger a problem for you?: never, rarely  How often do you feel unusually tired/fatigued?: sometimes  In the past 7 days, how much pain have you experienced?: none  In the past 6 months, have you lost or gained 10 pounds without trying?: No  One or more falls in the last year: No  Do you have trouble with the stairs inside or outside your home?: Yes  Does your home have working smoke alarms?: Yes  Does your home have a carbon monoxide monitor?: No  Which safety hazards (if any) have you experienced in your home? Please select all that apply.: none  How would you describe your current diet? Please select all that apply.: Regular  In addition to prescription medications, are you taking any over-the-counter supplements?: No  Can you manage your medications?: Yes  Are you currently taking any opioid medications?: No  Can you walk and transfer into and out of your bed and chair?: Yes  Can you dress and groom yourself?: Yes  Can you bathe or shower yourself?: Yes  Can you feed yourself?: Yes  Can you do your laundry/ housekeeping?: Yes  Can you manage your money, pay your bills, and track your expenses?: Yes  Can you make your own meals?: Yes  Can you do your own shopping?: Yes  Within the last 12 months, have you had any hospitalizations or Emergency Department visits?: No  Do you have a living will?: No  Do you have a Durable  POA (Power of ) for healthcare decisions?: No  Do you have an Advanced Directive for end of life decisions?: No  How often have you used an illegal drug (including marijuana) or a prescription medication for non-medical reasons in the past year?: never  What is the typical number of drinks you consume in a day?: 0  What is the typical number of drinks you consume in a week?: 0  How often did you have a drink containing alcohol in the past year?: never  How many drinks did you have on a typical day  when you were drinking in the past year?: 0  How often did you have 6 or more drinks on one occasion in the past year?: never

## 2025-02-28 NOTE — PROGRESS NOTES
Name: Nav Infante      : 1950      MRN: [id number]  Encounter Provider: Murphy Marks MD  Encounter Date: 2025   Encounter department: [facility]    Assessment & Plan  Essential hypertension    Prostate cancer (HCC)    Screening cholesterol level    Stage 3b chronic kidney disease (CKD) (HCC)  Lab Results   Component Value Date    EGFR 43 (L) 2023    EGFR 63 2018    EGFR 61 2018    CREATININE 1.65 (H) 2023    CREATININE 1.18 2018    CREATININE 1.20 2018   Pt to avoid NSAIDs and any IV dyes. Patient to follow up eoither with nephrology or  with us for  further  monitoring of  renal function.           Preventive health issues were discussed with patient, and age appropriate screening tests were ordered as noted in patient's After Visit Summary. Personalized health advice and appropriate referrals for health education or preventive services given if needed, as noted in patient's After Visit Summary.    History of Present Illness     HPI   Patient Care Team:  Murphy Marks MD as PCP - General (Family Medicine)    Review of Systems  Medical History Reviewed by provider this encounter:  Tobacco  Allergies  Meds  Problems  Med Hx  Surg Hx  Fam Hx       Annual Wellness Visit Questionnaire   Nav is here for his Subsequent Wellness visit.     Health Risk Assessment:   Patient rates overall health as good. Patient feels that their physical health rating is same. Patient is satisfied with their life. Eyesight was rated as same. Hearing was rated as same. Patient feels that their emotional and mental health rating is same. Patients states they are never, rarely angry. Patient states they are sometimes unusually tired/fatigued. Pain experienced in the last 7 days has been none. Patient states that he has experienced no weight loss or gain in last 6 months.     Depression Screening:   PHQ-2 Score: 0      Fall Risk Screening:   In the  past year, patient has experienced: no history of falling in past year      Home Safety:  Patient has trouble with stairs inside or outside of their home. Patient has working smoke alarms and has no working carbon monoxide detector. Home safety hazards include: none.     Nutrition:   Current diet is Regular.     Medications:   Patient is not currently taking any over-the-counter supplements. Patient is able to manage medications.     Activities of Daily Living (ADLs)/Instrumental Activities of Daily Living (IADLs):   Walk and transfer into and out of bed and chair?: Yes  Dress and groom yourself?: Yes    Bathe or shower yourself?: Yes    Feed yourself? Yes  Do your laundry/housekeeping?: Yes  Manage your money, pay your bills and track your expenses?: Yes  Make your own meals?: Yes    Do your own shopping?: Yes    Previous Hospitalizations:   Any hospitalizations or ED visits within the last 12 months?: No      Advance Care Planning:   Living will: No    Durable POA for healthcare: No    Advanced directive: No      PREVENTIVE SCREENINGS        Prostate Cancer Screening:    General: History Prostate Cancer and Screening Not Indicated      Abdominal Aortic Aneurysm (AAA) Screening:    Risk factors include: tobacco use        Hepatitis C Screening:    General: Screening Current    Screening, Brief Intervention, and Referral to Treatment (SBIRT)     Screening  Typical number of drinks in a day: 0  Typical number of drinks in a week: 0  Interpretation: Low risk drinking behavior.    AUDIT-C Screenin) How often did you have a drink containing alcohol in the past year? never  2) How many drinks did you have on a typical day when you were drinking in the past year? 0  3) How often did you have 6 or more drinks on one occasion in the past year? never    AUDIT-C Score: 0  Interpretation: Score 0-3 (male): Negative screen for alcohol misuse    Single Item Drug Screening:  How often have you used an illegal drug (including  "marijuana) or a prescription medication for non-medical reasons in the past year? never    Single Item Drug Screen Score: 0  Interpretation: Negative screen for possible drug use disorder    Social Drivers of Health     Financial Resource Strain: Low Risk  (9/20/2023)    Overall Financial Resource Strain (CARDIA)    • Difficulty of Paying Living Expenses: Not hard at all   Food Insecurity: No Food Insecurity (2/26/2025)    Hunger Vital Sign    • Worried About Running Out of Food in the Last Year: Never true    • Ran Out of Food in the Last Year: Never true   Transportation Needs: No Transportation Needs (2/26/2025)    PRAPARE - Transportation    • Lack of Transportation (Medical): No    • Lack of Transportation (Non-Medical): No   Housing Stability: Low Risk  (2/26/2025)    Housing Stability Vital Sign    • Unable to Pay for Housing in the Last Year: No    • Number of Times Moved in the Last Year: 0    • Homeless in the Last Year: No   Utilities: Not At Risk (2/26/2025)    [facility] Utilities    • Threatened with loss of utilities: No     No results found.    Objective   /84 (BP Location: Left arm, Patient Position: Sitting, Cuff Size: Large)   Pulse 70   Temp 98.1 °F (36.7 °C) (Skin)   Ht 5' 6\" (1.676 m)   Wt 69.9 kg (154 lb)   SpO2 95%   BMI 24.86 kg/m²     Physical Exam    "

## 2025-04-05 ENCOUNTER — APPOINTMENT (EMERGENCY)
Dept: CT IMAGING | Facility: HOSPITAL | Age: 75
DRG: 329 | End: 2025-04-05
Payer: COMMERCIAL

## 2025-04-05 ENCOUNTER — ANESTHESIA EVENT (EMERGENCY)
Dept: PERIOP | Facility: HOSPITAL | Age: 75
DRG: 329 | End: 2025-04-05
Payer: COMMERCIAL

## 2025-04-05 ENCOUNTER — ANESTHESIA (EMERGENCY)
Dept: PERIOP | Facility: HOSPITAL | Age: 75
DRG: 329 | End: 2025-04-05
Payer: COMMERCIAL

## 2025-04-05 ENCOUNTER — HOSPITAL ENCOUNTER (INPATIENT)
Facility: HOSPITAL | Age: 75
LOS: 6 days | Discharge: HOME | DRG: 329 | End: 2025-04-11
Attending: EMERGENCY MEDICINE | Admitting: SURGERY
Payer: COMMERCIAL

## 2025-04-05 DIAGNOSIS — R10.9 ACUTE ABDOMINAL PAIN: ICD-10-CM

## 2025-04-05 DIAGNOSIS — R91.1 LUNG NODULE: ICD-10-CM

## 2025-04-05 DIAGNOSIS — K56.609 INTESTINAL OBSTRUCTION, UNSPECIFIED CAUSE, UNSPECIFIED WHETHER PARTIAL OR COMPLETE (HCC): Primary | ICD-10-CM

## 2025-04-05 LAB
2HR DELTA HS TROPONIN: -2 NG/L
ABO GROUP BLD: NORMAL
ALBUMIN SERPL BCG-MCNC: 3.9 G/DL (ref 3.5–5)
ALP SERPL-CCNC: 86 U/L (ref 34–104)
ALT SERPL W P-5'-P-CCNC: 6 U/L (ref 7–52)
ANION GAP SERPL CALCULATED.3IONS-SCNC: 9 MMOL/L (ref 4–13)
APTT PPP: 27 SECONDS (ref 23–34)
AST SERPL W P-5'-P-CCNC: 11 U/L (ref 13–39)
BASOPHILS # BLD AUTO: 0.13 THOUSANDS/ÂΜL (ref 0–0.1)
BASOPHILS NFR BLD AUTO: 1 % (ref 0–1)
BILIRUB SERPL-MCNC: 0.43 MG/DL (ref 0.2–1)
BLD GP AB SCN SERPL QL: NEGATIVE
BUN SERPL-MCNC: 26 MG/DL (ref 5–25)
CALCIUM SERPL-MCNC: 9 MG/DL (ref 8.4–10.2)
CARDIAC TROPONIN I PNL SERPL HS: 4 NG/L (ref ?–50)
CARDIAC TROPONIN I PNL SERPL HS: 6 NG/L (ref ?–50)
CHLORIDE SERPL-SCNC: 104 MMOL/L (ref 96–108)
CO2 SERPL-SCNC: 26 MMOL/L (ref 21–32)
CREAT SERPL-MCNC: 1.27 MG/DL (ref 0.6–1.3)
EOSINOPHIL # BLD AUTO: 0.3 THOUSAND/ÂΜL (ref 0–0.61)
EOSINOPHIL NFR BLD AUTO: 2 % (ref 0–6)
ERYTHROCYTE [DISTWIDTH] IN BLOOD BY AUTOMATED COUNT: 14.2 % (ref 11.6–15.1)
GFR SERPL CREATININE-BSD FRML MDRD: 54 ML/MIN/1.73SQ M
GLUCOSE SERPL-MCNC: 169 MG/DL (ref 65–140)
HCT VFR BLD AUTO: 38.7 % (ref 36.5–49.3)
HGB BLD-MCNC: 12.3 G/DL (ref 12–17)
IMM GRANULOCYTES # BLD AUTO: 0.08 THOUSAND/UL (ref 0–0.2)
IMM GRANULOCYTES NFR BLD AUTO: 1 % (ref 0–2)
INR PPP: 1.02 (ref 0.85–1.19)
LACTATE SERPL-SCNC: 2.3 MMOL/L (ref 0.5–2)
LACTATE SERPL-SCNC: 6.6 MMOL/L (ref 0.5–2)
LIPASE SERPL-CCNC: 20 U/L (ref 11–82)
LYMPHOCYTES # BLD AUTO: 1.84 THOUSANDS/ÂΜL (ref 0.6–4.47)
LYMPHOCYTES NFR BLD AUTO: 14 % (ref 14–44)
MCH RBC QN AUTO: 28.9 PG (ref 26.8–34.3)
MCHC RBC AUTO-ENTMCNC: 31.8 G/DL (ref 31.4–37.4)
MCV RBC AUTO: 91 FL (ref 82–98)
MONOCYTES # BLD AUTO: 0.99 THOUSAND/ÂΜL (ref 0.17–1.22)
MONOCYTES NFR BLD AUTO: 8 % (ref 4–12)
NEUTROPHILS # BLD AUTO: 9.51 THOUSANDS/ÂΜL (ref 1.85–7.62)
NEUTS SEG NFR BLD AUTO: 74 % (ref 43–75)
NRBC BLD AUTO-RTO: 0 /100 WBCS
PLATELET # BLD AUTO: 255 THOUSANDS/UL (ref 149–390)
PMV BLD AUTO: 9.3 FL (ref 8.9–12.7)
POTASSIUM SERPL-SCNC: 4 MMOL/L (ref 3.5–5.3)
PROT SERPL-MCNC: 7.4 G/DL (ref 6.4–8.4)
PROTHROMBIN TIME: 14.1 SECONDS (ref 12.3–15)
RBC # BLD AUTO: 4.26 MILLION/UL (ref 3.88–5.62)
RH BLD: POSITIVE
SODIUM SERPL-SCNC: 139 MMOL/L (ref 135–147)
SPECIMEN EXPIRATION DATE: NORMAL
WBC # BLD AUTO: 12.85 THOUSAND/UL (ref 4.31–10.16)

## 2025-04-05 PROCEDURE — 0DT80ZZ RESECTION OF SMALL INTESTINE, OPEN APPROACH: ICD-10-PCS | Performed by: SURGERY

## 2025-04-05 PROCEDURE — 96366 THER/PROPH/DIAG IV INF ADDON: CPT

## 2025-04-05 PROCEDURE — 99223 1ST HOSP IP/OBS HIGH 75: CPT | Performed by: SURGERY

## 2025-04-05 PROCEDURE — 71275 CT ANGIOGRAPHY CHEST: CPT

## 2025-04-05 PROCEDURE — 80053 COMPREHEN METABOLIC PANEL: CPT

## 2025-04-05 PROCEDURE — 86850 RBC ANTIBODY SCREEN: CPT

## 2025-04-05 PROCEDURE — 99285 EMERGENCY DEPT VISIT HI MDM: CPT

## 2025-04-05 PROCEDURE — 84484 ASSAY OF TROPONIN QUANT: CPT

## 2025-04-05 PROCEDURE — 96374 THER/PROPH/DIAG INJ IV PUSH: CPT

## 2025-04-05 PROCEDURE — 96375 TX/PRO/DX INJ NEW DRUG ADDON: CPT

## 2025-04-05 PROCEDURE — 96376 TX/PRO/DX INJ SAME DRUG ADON: CPT

## 2025-04-05 PROCEDURE — 36415 COLL VENOUS BLD VENIPUNCTURE: CPT

## 2025-04-05 PROCEDURE — 75635 CT ANGIO ABDOMINAL ARTERIES: CPT

## 2025-04-05 PROCEDURE — 93005 ELECTROCARDIOGRAM TRACING: CPT

## 2025-04-05 PROCEDURE — 83690 ASSAY OF LIPASE: CPT | Performed by: STUDENT IN AN ORGANIZED HEALTH CARE EDUCATION/TRAINING PROGRAM

## 2025-04-05 PROCEDURE — 83605 ASSAY OF LACTIC ACID: CPT

## 2025-04-05 PROCEDURE — 86900 BLOOD TYPING SEROLOGIC ABO: CPT

## 2025-04-05 PROCEDURE — 86901 BLOOD TYPING SEROLOGIC RH(D): CPT

## 2025-04-05 PROCEDURE — 85610 PROTHROMBIN TIME: CPT

## 2025-04-05 PROCEDURE — 88307 TISSUE EXAM BY PATHOLOGIST: CPT | Performed by: PATHOLOGY

## 2025-04-05 PROCEDURE — 85025 COMPLETE CBC W/AUTO DIFF WBC: CPT

## 2025-04-05 PROCEDURE — 96365 THER/PROPH/DIAG IV INF INIT: CPT

## 2025-04-05 PROCEDURE — 44120 REMOVAL OF SMALL INTESTINE: CPT | Performed by: SURGERY

## 2025-04-05 PROCEDURE — 85730 THROMBOPLASTIN TIME PARTIAL: CPT

## 2025-04-05 RX ORDER — ONDANSETRON 2 MG/ML
4 INJECTION INTRAMUSCULAR; INTRAVENOUS EVERY 6 HOURS PRN
Status: DISCONTINUED | OUTPATIENT
Start: 2025-04-05 | End: 2025-04-11 | Stop reason: HOSPADM

## 2025-04-05 RX ORDER — FENTANYL CITRATE 50 UG/ML
100 INJECTION, SOLUTION INTRAMUSCULAR; INTRAVENOUS ONCE
Refills: 0 | Status: COMPLETED | OUTPATIENT
Start: 2025-04-05 | End: 2025-04-05

## 2025-04-05 RX ORDER — MAGNESIUM HYDROXIDE 1200 MG/15ML
LIQUID ORAL AS NEEDED
Status: DISCONTINUED | OUTPATIENT
Start: 2025-04-05 | End: 2025-04-06 | Stop reason: HOSPADM

## 2025-04-05 RX ORDER — ALBUMIN HUMAN 50 G/1000ML
SOLUTION INTRAVENOUS CONTINUOUS PRN
Status: DISCONTINUED | OUTPATIENT
Start: 2025-04-05 | End: 2025-04-06

## 2025-04-05 RX ORDER — FENTANYL CITRATE 50 UG/ML
1 INJECTION, SOLUTION INTRAMUSCULAR; INTRAVENOUS ONCE
Refills: 0 | Status: COMPLETED | OUTPATIENT
Start: 2025-04-05 | End: 2025-04-05

## 2025-04-05 RX ORDER — HYDROMORPHONE HCL/PF 1 MG/ML
0.2 SYRINGE (ML) INJECTION
Refills: 0 | Status: DISCONTINUED | OUTPATIENT
Start: 2025-04-05 | End: 2025-04-11 | Stop reason: HOSPADM

## 2025-04-05 RX ORDER — PHENYLEPHRINE HCL IN 0.9% NACL 1 MG/10 ML
SYRINGE (ML) INTRAVENOUS AS NEEDED
Status: DISCONTINUED | OUTPATIENT
Start: 2025-04-05 | End: 2025-04-06

## 2025-04-05 RX ORDER — METRONIDAZOLE 500 MG/100ML
INJECTION, SOLUTION INTRAVENOUS CONTINUOUS PRN
Status: DISCONTINUED | OUTPATIENT
Start: 2025-04-05 | End: 2025-04-06

## 2025-04-05 RX ORDER — DEXAMETHASONE SODIUM PHOSPHATE 10 MG/ML
INJECTION, SOLUTION INTRAMUSCULAR; INTRAVENOUS AS NEEDED
Status: DISCONTINUED | OUTPATIENT
Start: 2025-04-05 | End: 2025-04-06

## 2025-04-05 RX ORDER — EPHEDRINE SULFATE 50 MG/ML
INJECTION INTRAVENOUS AS NEEDED
Status: DISCONTINUED | OUTPATIENT
Start: 2025-04-05 | End: 2025-04-06

## 2025-04-05 RX ORDER — ROCURONIUM BROMIDE 10 MG/ML
INJECTION, SOLUTION INTRAVENOUS AS NEEDED
Status: DISCONTINUED | OUTPATIENT
Start: 2025-04-05 | End: 2025-04-06

## 2025-04-05 RX ORDER — LABETALOL HYDROCHLORIDE 5 MG/ML
10 INJECTION, SOLUTION INTRAVENOUS EVERY 4 HOURS PRN
Status: DISCONTINUED | OUTPATIENT
Start: 2025-04-05 | End: 2025-04-11 | Stop reason: HOSPADM

## 2025-04-05 RX ORDER — CEFAZOLIN SODIUM 1 G/3ML
INJECTION, POWDER, FOR SOLUTION INTRAMUSCULAR; INTRAVENOUS AS NEEDED
Status: DISCONTINUED | OUTPATIENT
Start: 2025-04-05 | End: 2025-04-06

## 2025-04-05 RX ORDER — PROPOFOL 10 MG/ML
INJECTION, EMULSION INTRAVENOUS AS NEEDED
Status: DISCONTINUED | OUTPATIENT
Start: 2025-04-05 | End: 2025-04-06

## 2025-04-05 RX ORDER — ONDANSETRON 2 MG/ML
4 INJECTION INTRAMUSCULAR; INTRAVENOUS ONCE
Status: COMPLETED | OUTPATIENT
Start: 2025-04-05 | End: 2025-04-05

## 2025-04-05 RX ORDER — OXYCODONE HYDROCHLORIDE 5 MG/1
5 TABLET ORAL EVERY 6 HOURS PRN
Refills: 0 | Status: DISCONTINUED | OUTPATIENT
Start: 2025-04-05 | End: 2025-04-06

## 2025-04-05 RX ORDER — LIDOCAINE HYDROCHLORIDE 10 MG/ML
INJECTION, SOLUTION EPIDURAL; INFILTRATION; INTRACAUDAL; PERINEURAL AS NEEDED
Status: DISCONTINUED | OUTPATIENT
Start: 2025-04-05 | End: 2025-04-06

## 2025-04-05 RX ORDER — SUCCINYLCHOLINE/SOD CL,ISO/PF 100 MG/5ML
SYRINGE (ML) INTRAVENOUS AS NEEDED
Status: DISCONTINUED | OUTPATIENT
Start: 2025-04-05 | End: 2025-04-06

## 2025-04-05 RX ORDER — SODIUM CHLORIDE, SODIUM GLUCONATE, SODIUM ACETATE, POTASSIUM CHLORIDE, MAGNESIUM CHLORIDE, SODIUM PHOSPHATE, DIBASIC, AND POTASSIUM PHOSPHATE .53; .5; .37; .037; .03; .012; .00082 G/100ML; G/100ML; G/100ML; G/100ML; G/100ML; G/100ML; G/100ML
100 INJECTION, SOLUTION INTRAVENOUS CONTINUOUS
Status: DISCONTINUED | OUTPATIENT
Start: 2025-04-05 | End: 2025-04-06

## 2025-04-05 RX ORDER — FENTANYL CITRATE 50 UG/ML
INJECTION, SOLUTION INTRAMUSCULAR; INTRAVENOUS AS NEEDED
Status: DISCONTINUED | OUTPATIENT
Start: 2025-04-05 | End: 2025-04-06

## 2025-04-05 RX ORDER — HYDRALAZINE HYDROCHLORIDE 20 MG/ML
5 INJECTION INTRAMUSCULAR; INTRAVENOUS EVERY 4 HOURS PRN
Status: DISCONTINUED | OUTPATIENT
Start: 2025-04-05 | End: 2025-04-06

## 2025-04-05 RX ORDER — NICOTINE 21 MG/24HR
1 PATCH, TRANSDERMAL 24 HOURS TRANSDERMAL DAILY
Status: DISCONTINUED | OUTPATIENT
Start: 2025-04-06 | End: 2025-04-11 | Stop reason: HOSPADM

## 2025-04-05 RX ORDER — MORPHINE SULFATE 4 MG/ML
4 INJECTION, SOLUTION INTRAMUSCULAR; INTRAVENOUS ONCE
Status: COMPLETED | OUTPATIENT
Start: 2025-04-05 | End: 2025-04-05

## 2025-04-05 RX ORDER — HEPARIN SODIUM 5000 [USP'U]/ML
5000 INJECTION, SOLUTION INTRAVENOUS; SUBCUTANEOUS EVERY 8 HOURS SCHEDULED
Status: DISCONTINUED | OUTPATIENT
Start: 2025-04-05 | End: 2025-04-11 | Stop reason: HOSPADM

## 2025-04-05 RX ORDER — SODIUM CHLORIDE, SODIUM LACTATE, POTASSIUM CHLORIDE, CALCIUM CHLORIDE 600; 310; 30; 20 MG/100ML; MG/100ML; MG/100ML; MG/100ML
INJECTION, SOLUTION INTRAVENOUS CONTINUOUS PRN
Status: DISCONTINUED | OUTPATIENT
Start: 2025-04-05 | End: 2025-04-06

## 2025-04-05 RX ADMIN — Medication 100 MCG: at 22:37

## 2025-04-05 RX ADMIN — METRONIDAZOLE: 500 INJECTION, SOLUTION INTRAVENOUS at 22:35

## 2025-04-05 RX ADMIN — FENTANYL CITRATE 100 MCG: 50 INJECTION INTRAMUSCULAR; INTRAVENOUS at 20:01

## 2025-04-05 RX ADMIN — Medication 100 MG: at 22:29

## 2025-04-05 RX ADMIN — Medication 100 MCG: at 23:39

## 2025-04-05 RX ADMIN — SODIUM CHLORIDE, SODIUM LACTATE, POTASSIUM CHLORIDE, AND CALCIUM CHLORIDE: .6; .31; .03; .02 INJECTION, SOLUTION INTRAVENOUS at 22:53

## 2025-04-05 RX ADMIN — MORPHINE SULFATE 4 MG: 4 INJECTION INTRAVENOUS at 20:23

## 2025-04-05 RX ADMIN — DEXAMETHASONE SODIUM PHOSPHATE 10 MG: 10 INJECTION INTRAMUSCULAR; INTRAVENOUS at 22:55

## 2025-04-05 RX ADMIN — Medication 100 MCG: at 23:13

## 2025-04-05 RX ADMIN — FENTANYL CITRATE 100 MCG: 50 INJECTION INTRAMUSCULAR; INTRAVENOUS at 21:04

## 2025-04-05 RX ADMIN — SODIUM CHLORIDE, SODIUM LACTATE, POTASSIUM CHLORIDE, AND CALCIUM CHLORIDE: .6; .31; .03; .02 INJECTION, SOLUTION INTRAVENOUS at 23:36

## 2025-04-05 RX ADMIN — Medication 100 MCG: at 22:44

## 2025-04-05 RX ADMIN — FENTANYL CITRATE 50 MCG: 50 INJECTION INTRAMUSCULAR; INTRAVENOUS at 23:05

## 2025-04-05 RX ADMIN — ALBUMIN (HUMAN): 12.5 INJECTION, SOLUTION INTRAVENOUS at 23:22

## 2025-04-05 RX ADMIN — ONDANSETRON 4 MG: 2 INJECTION, SOLUTION INTRAMUSCULAR; INTRAVENOUS at 20:20

## 2025-04-05 RX ADMIN — CEFAZOLIN 2000 MG: 1 INJECTION, POWDER, FOR SOLUTION INTRAMUSCULAR; INTRAVENOUS at 22:36

## 2025-04-05 RX ADMIN — EPHEDRINE SULFATE 10 MG: 50 INJECTION INTRAVENOUS at 23:19

## 2025-04-05 RX ADMIN — SODIUM CHLORIDE, SODIUM LACTATE, POTASSIUM CHLORIDE, AND CALCIUM CHLORIDE: .6; .31; .03; .02 INJECTION, SOLUTION INTRAVENOUS at 22:23

## 2025-04-05 RX ADMIN — ROCURONIUM 10 MG: 50 INJECTION, SOLUTION INTRAVENOUS at 23:06

## 2025-04-05 RX ADMIN — ROCURONIUM 30 MG: 50 INJECTION, SOLUTION INTRAVENOUS at 22:35

## 2025-04-05 RX ADMIN — FENTANYL CITRATE 50 MCG: 50 INJECTION INTRAMUSCULAR; INTRAVENOUS at 22:29

## 2025-04-05 RX ADMIN — Medication 100 MCG: at 22:29

## 2025-04-05 RX ADMIN — ROCURONIUM 10 MG: 50 INJECTION, SOLUTION INTRAVENOUS at 23:44

## 2025-04-05 RX ADMIN — LIDOCAINE HYDROCHLORIDE 50 MG: 10 INJECTION, SOLUTION EPIDURAL; INFILTRATION; INTRACAUDAL at 22:29

## 2025-04-05 RX ADMIN — SODIUM CHLORIDE, SODIUM LACTATE, POTASSIUM CHLORIDE, AND CALCIUM CHLORIDE 1000 ML: .6; .31; .03; .02 INJECTION, SOLUTION INTRAVENOUS at 20:18

## 2025-04-05 RX ADMIN — IOHEXOL 100 ML: 350 INJECTION, SOLUTION INTRAVENOUS at 19:46

## 2025-04-05 RX ADMIN — PROPOFOL 130 MG: 10 INJECTION, EMULSION INTRAVENOUS at 22:29

## 2025-04-05 RX ADMIN — PROPOFOL 40 MG: 10 INJECTION, EMULSION INTRAVENOUS at 23:44

## 2025-04-05 NOTE — ED PROVIDER NOTES
Time reflects when diagnosis was documented in both MDM as applicable and the Disposition within this note       Time User Action Codes Description Comment    4/5/2025  8:20 PM Emmanuelle Marshall Add [K56.609] Intestinal obstruction, unspecified cause, unspecified whether partial or complete (HCC)     4/5/2025  9:23 PM Alexander Piero Add [R91.1] Lung nodule     4/5/2025  9:25 PM Chapincito Brown Add [R10.9] Acute abdominal pain     4/5/2025 11:08 PM Julia Murillo Modify [R10.9] Acute abdominal pain           ED Disposition       ED Disposition   Send to OR    Condition   --    Date/Time   Sat Apr 5, 2025  9:28 PM    Comment   --             Assessment & Plan       Medical Decision Making  Nav Infante is a 75 y.o. male with a past medical history of hypertension being evaluated for acute onset periumbilical abdominal pain.    Differential diagnoses include but not limited to: Aortic dissection, volvulus, small bowel obstruction, pancreatitis, appendicitis, mesenteric ischemia, MI    Initial workup to include: CBC, CMP, lipase, troponin, coags, EKG, CTA chest, CTA abdomen with runoff    See ED Course for data/imaging interpretation and further MDM.    Disposition: CTA abdomen with evidence of small bowel obstruction with possible bowel ischemia.  Discussed case urgently with general surgery resident on-call who evaluated patient at bedside in the ED, patient taken to OR for surgery.      Amount and/or Complexity of Data Reviewed  Labs: ordered. Decision-making details documented in ED Course.  Radiology: ordered. Decision-making details documented in ED Course.    Risk  Prescription drug management.  Decision regarding hospitalization.        ED Course as of 04/06/25 0934   Sat Apr 05, 2025 2000 Discussed case with general surgery resident on-call for emergent evaluation in the ED due to severe abdominal pain with surgical exam with suspected bowel obstruction on CT.  Called radiologist on-call for expedited read of  CT imaging   2011 LACTIC ACID(!): 2.3  1 L LR given   2100 Patient in significant pain, ordered additional 100 mcg of fentanyl   2113 Comprehensive metabolic panel(!)  No electrolyte abnormalities, kidney function similar to baseline   2113 hs TnI 0hr: 6  Mild elevation   2115 General surgery at bedside   2121 CTA chest wo w contrast    IMPRESSION:     No acute aortic abnormality.  Smooth irregular stenosis of the right axillary artery is likely secondary to vessel tortuosity.     Bilateral lower lobe bronchial wall thickening, worse on the left where there are multifocal proximal occlusions and inferior left hilar adenopathy.  Corresponding groundglass opacities within the basilar left lower lobe.  Findings consistent with bronchitis and an infectious infiltrate.     4 mm right lower lobe nodule.  Follow-up chest CT recommended in 12 months considering diffuse emphysematous change.     The study was marked in EPIC for immediate notification.                 Workstation performed: [id number]         Medications   fentanyl citrate (PF) (FOR EMS ONLY) 100 mcg/2 mL injection 100 mcg (0 mcg Does not apply Given to EMS 4/5/25 1954)   fentaNYL injection 100 mcg (100 mcg Intravenous Given 4/5/25 2001)   iohexol (OMNIPAQUE) 350 MG/ML injection (MULTI-DOSE) 100 mL (100 mL Intravenous Given 4/5/25 1946)   lactated ringers bolus 1,000 mL (1,000 mL Intravenous New Bag 4/5/25 2018)   morphine injection 4 mg (4 mg Intravenous Given 4/5/25 2023)   ondansetron (ZOFRAN) injection 4 mg (4 mg Intravenous Given 4/5/25 2020)   fentaNYL injection 100 mcg (100 mcg Intravenous Given 4/5/25 2104)       ED Risk Strat Scores   HEART Risk Score      Flowsheet Row Most Recent Value   Heart Score Risk Calculator    History 1 Filed at: 04/06/2025 0934   ECG 1 Filed at: 04/06/2025 0934   Age 2 Filed at: 04/06/2025 0934   Risk Factors 1 Filed at: 04/06/2025 0934   Troponin 0 Filed at: 04/06/2025 0934   HEART Score 5 Filed at: 04/06/2025 0934           HEART Risk Score      Flowsheet Row Most Recent Value   Heart Score Risk Calculator    History 1 Filed at: 04/06/2025 0934   ECG 1 Filed at: 04/06/2025 0934   Age 2 Filed at: 04/06/2025 0934   Risk Factors 1 Filed at: 04/06/2025 0934   Troponin 0 Filed at: 04/06/2025 0934   HEART Score 5 Filed at: 04/06/2025 0934                                                  History of Present Illness       Chief Complaint   Patient presents with    Abdominal Pain     Patient arrived via EMS from home for eval of constant abd pain that started 2 hrs ago. Given 100 of fentanyl thus far by EMS. +Left leg swelling. Pt co N/Vomiting. Denies urinary sx, fevers. Has had 100ml of NS thus far. Hx HTN.        Past Medical History:   Diagnosis Date    Cancer (HCC)     PROSTATE    Essential hypertension 2/4/2018    Femur fracture, right (HCC)     Gastritis 10/11/2016    Tobacco abuse 10/11/2016      Past Surgical History:   Procedure Laterality Date    CHOLECYSTECTOMY      FRACTURE SURGERY      PA OPTX FEM SHFT FX W/INSJ IMED IMPLT W/WO SCREW Left 2/5/2018    Procedure: LEFT HIP SHORT TROCHANTERIC FEMORAL NAIL;  Surgeon: Ej Hall MD;  Location: [facility];  Service: Orthopedics    PROSTATECTOMY  2011      Family History   Problem Relation Age of Onset    Stroke Mother     Stroke Father     Hypertension Father       Social History     Tobacco Use    Smoking status: Every Day     Types: Pipe    Smokeless tobacco: Never    Tobacco comments:     smokes pipe tabacco    Vaping Use    Vaping status: Never Used   Substance Use Topics    Alcohol use: No    Drug use: No      E-Cigarette/Vaping    E-Cigarette Use Never User       E-Cigarette/Vaping Substances    Nicotine No     THC No     CBD No     Flavoring No     Other No     Unknown No       I have reviewed and agree with the history as documented.     Nav Infante is a 75 y.o. male with a past medical history of hypertension being evaluated for acute onset periumbilical abdominal  pain.  Per EMS, patient reported from home with sudden onset abdominal pain 2 hours prior to arrival, given 100 mcgs of fentanyl in the field with significant pain.  Endorses history of hypertension and tobacco use.  Patient complaining of nausea and vomiting.  Patient is primarily moaning in pain on initial evaluation unable to provide complete history.  No prior history of ACS, DVT/PE, dissection per patient.  Denies fever, chills, dysuria urgency or frequency.            Abdominal Pain  Associated symptoms: nausea and vomiting        Review of Systems   Gastrointestinal:  Positive for abdominal pain (Periumbillical), nausea and vomiting.   All other systems reviewed and are negative.          Objective       ED Triage Vitals   Temperature Pulse Blood Pressure Respirations SpO2 Patient Position - Orthostatic VS   04/05/25 1918 04/05/25 1918 04/05/25 1918 04/05/25 1918 04/05/25 1918 04/05/25 1918   97.6 °F (36.4 °C) 59 (!) 206/92 (!) 26 90 % Sitting      Temp Source Heart Rate Source BP Location FiO2 (%) Pain Score    04/05/25 1918 04/05/25 1918 04/05/25 1918 -- 04/05/25 2001    Oral Monitor Left arm  5      Vitals      Date and Time Temp Pulse SpO2 Resp BP Pain Score FACES Pain Rating User   04/06/25 0700 -- 67 -- -- -- -- -- MM   04/06/25 0655 -- 54 100 % 18 155/75 -- -- TC   04/06/25 0500 -- 56 100 % 15 164/84 -- -- TC   04/06/25 0300 -- 51 100 % 14 157/79 -- -- TC   04/06/25 0249 -- 22 -- -- -- -- -- TC   04/06/25 0230 -- 22 100 % 13 163/77 -- -- TC   04/06/25 0200 -- 20 -- -- -- No Pain -- TC   04/06/25 0138 -- 50 100 % 10 160/85 -- -- NW   04/06/25 0111 97.2 °F (36.2 °C) 60 92 % 14 170/88 6 -- TC   04/06/25 0100 -- 68 92 % 18 172/84 -- -- TC   04/05/25 2130 -- 67 99 % 26 192/79 -- -- DP   04/05/25 2115 -- 60 97 % 26 218/96 -- -- DP   04/05/25 2104 -- -- -- -- -- 10 - Worst Possible Pain -- DP   04/05/25 2100 -- 69 98 % 26 196/84 -- -- DP   04/05/25 2030 -- 65 98 % 26 193/84 -- -- DP   04/05/25 2023 -- -- --  -- -- 10 - Worst Possible Pain -- DP   04/05/25 2001 -- -- -- -- -- 5 reports 5/10. actively screaming out in pain -- JH   04/05/25 1922 -- 58 93 % 26 192/86 -- -- DP   04/05/25 1918 97.6 °F (36.4 °C) 59 90 % 26 206/92 -- -- DP            Physical Exam  Constitutional:       General: He is in acute distress.      Appearance: Normal appearance. He is ill-appearing.   Eyes:      Extraocular Movements: Extraocular movements intact.      Pupils: Pupils are equal, round, and reactive to light.   Cardiovascular:      Rate and Rhythm: Normal rate and regular rhythm.      Pulses: Normal pulses.      Heart sounds: Normal heart sounds. No murmur heard.     No friction rub. No gallop.   Pulmonary:      Effort: Pulmonary effort is normal. No respiratory distress.      Breath sounds: Normal breath sounds. No stridor. No wheezing, rhonchi or rales.   Abdominal:      General: Abdomen is flat. Bowel sounds are normal. There is no distension.      Palpations: Abdomen is soft.      Tenderness: There is abdominal tenderness in the periumbilical area. There is guarding and rebound. There is no right CVA tenderness or left CVA tenderness.   Skin:     General: Skin is warm and dry.      Capillary Refill: Capillary refill takes less than 2 seconds.      Findings: No rash.   Neurological:      General: No focal deficit present.      Mental Status: He is alert and oriented to person, place, and time. Mental status is at baseline.         Results Reviewed       Procedure Component Value Units Date/Time    HS Troponin I 4hr [[id number]]  (Normal) Collected: 04/06/25 0055    Lab Status: Final result Specimen: Blood from Arm, Right Updated: 04/06/25 0137     hs TnI 4hr 8 ng/L      Delta 4hr hsTnI 2 ng/L     Lactic acid 2 Hours [[id number]]  (Abnormal) Collected: 04/05/25 2112    Lab Status: Final result Specimen: Blood from Arm, Right Updated: 04/05/25 2229     LACTIC ACID 6.6 mmol/L     Narrative:      Result may be elevated if tourniquet was  used during collection.    Lipase [[id number]]  (Normal) Collected: 04/05/25 1935    Lab Status: Final result Specimen: Blood from Arm, Right Updated: 04/05/25 2226     Lipase 20 u/L     HS Troponin I 2hr [[id number]]  (Normal) Collected: 04/05/25 2112    Lab Status: Final result Specimen: Blood from Arm, Right Updated: 04/05/25 2158     hs TnI 2hr 4 ng/L      Delta 2hr hsTnI -2 ng/L     HS Troponin 0hr (reflex protocol) [[id number]]  (Normal) Collected: 04/05/25 1935    Lab Status: Final result Specimen: Blood from Arm, Right Updated: 04/05/25 2016     hs TnI 0hr 6 ng/L     Comprehensive metabolic panel [[id number]]  (Abnormal) Collected: 04/05/25 1935    Lab Status: Final result Specimen: Blood from Arm, Right Updated: 04/05/25 2008     Sodium 139 mmol/L      Potassium 4.0 mmol/L      Chloride 104 mmol/L      CO2 26 mmol/L      ANION GAP 9 mmol/L      BUN 26 mg/dL      Creatinine 1.27 mg/dL      Glucose 169 mg/dL      Calcium 9.0 mg/dL      AST 11 U/L      ALT 6 U/L      Alkaline Phosphatase 86 U/L      Total Protein 7.4 g/dL      Albumin 3.9 g/dL      Total Bilirubin 0.43 mg/dL      eGFR 54 ml/min/1.73sq m     Narrative:      [employer] guidelines for Chronic Kidney Disease (CKD):     Stage 1 with normal or high GFR (GFR > 90 mL/min/1.73 square meters)    Stage 2 Mild CKD (GFR = 60-89 mL/min/1.73 square meters)    Stage 3A Moderate CKD (GFR = 45-59 mL/min/1.73 square meters)    Stage 3B Moderate CKD (GFR = 30-44 mL/min/1.73 square meters)    Stage 4 Severe CKD (GFR = 15-29 mL/min/1.73 square meters)    Stage 5 End Stage CKD (GFR <15 mL/min/1.73 square meters)  Note: GFR calculation is accurate only with a steady state creatinine    Lactic acid, plasma (w/reflex if result > 2.0) [[id number]]  (Abnormal) Collected: 04/05/25 1935    Lab Status: Final result Specimen: Blood from Arm, Right Updated: 04/05/25 2007     LACTIC ACID 2.3 mmol/L     Narrative:      Result may be elevated if tourniquet was  used during collection.    Protime-INR [[id number]]  (Normal) Collected: 04/05/25 1935    Lab Status: Final result Specimen: Blood from Arm, Right Updated: 04/05/25 2005     Protime 14.1 seconds      INR 1.02    Narrative:      INR Therapeutic Range    Indication                                             INR Range      Atrial Fibrillation                                               2.0-3.0  Hypercoagulable State                                    2.0.2.3  Left Ventricular Asist Device                            2.0-3.0  Mechanical Heart Valve                                  -    Aortic(with afib, MI, embolism, HF, LA enlargement,    and/or coagulopathy)                                     2.0-3.0 (2.5-3.5)     Mitral                                                             2.5-3.5  Prosthetic/Bioprosthetic Heart Valve               2.0-3.0  Venous thromboembolism (VTE: VT, PE        2.0-3.0    APTT [[id number]]  (Normal) Collected: 04/05/25 1935    Lab Status: Final result Specimen: Blood from Arm, Right Updated: 04/05/25 2005     PTT 27 seconds     CBC and differential [[id number]]  (Abnormal) Collected: 04/05/25 1935    Lab Status: Final result Specimen: Blood from Arm, Right Updated: 04/05/25 1947     WBC 12.85 Thousand/uL      RBC 4.26 Million/uL      Hemoglobin 12.3 g/dL      Hematocrit 38.7 %      MCV 91 fL      MCH 28.9 pg      MCHC 31.8 g/dL      RDW 14.2 %      MPV 9.3 fL      Platelets 255 Thousands/uL      nRBC 0 /100 WBCs      Segmented % 74 %      Immature Grans % 1 %      Lymphocytes % 14 %      Monocytes % 8 %      Eosinophils Relative 2 %      Basophils Relative 1 %      Absolute Neutrophils 9.51 Thousands/µL      Absolute Immature Grans 0.08 Thousand/uL      Absolute Lymphocytes 1.84 Thousands/µL      Absolute Monocytes 0.99 Thousand/µL      Eosinophils Absolute 0.30 Thousand/µL      Basophils Absolute 0.13 Thousands/µL             CTA chest wo w contrast   Final Interpretation by Abdulkadir Hutchins  MD Mahamed (04/05 2117)      No acute aortic abnormality.   Smooth irregular stenosis of the right axillary artery is likely secondary to vessel tortuosity.      Bilateral lower lobe bronchial wall thickening, worse on the left where there are multifocal proximal occlusions and inferior left hilar adenopathy.   Corresponding groundglass opacities within the basilar left lower lobe.   Findings consistent with bronchitis and an infectious infiltrate.      4 mm right lower lobe nodule.   Follow-up chest CT recommended in 12 months considering diffuse emphysematous change.      The study was marked in EPIC for immediate notification.                  Workstation performed: [id number]         CTA abdominal w run off w wo contrast   Final Interpretation by Sarai Millard MD (04/05 2053)      1.  No aortic dissection.   2.  Dilated small bowel loops in the lower abdomen/pelvis with thickened and hypoenhancing bowel wall and small amount of adjacent free fluid. Differential considerations include small bowel ischemia; other etiologies are not excluded. Correlate with    serum lactate. Recommend general surgery consultation.      The study was marked in EPIC for immediate notification.      Workstation performed: [id number]             ECG 12 Lead Documentation Only    Date/Time: 4/6/2025 9:33 AM    Performed by: Piero Munoz DO  Authorized by: Piero Munoz DO    ECG reviewed by me, the ED Provider: yes    Patient location:  ED  Previous ECG:     Previous ECG:  Compared to current    Similarity:  No change  Interpretation:     Interpretation: normal    Rate:     ECG rate:  62    ECG rate assessment: normal    Rhythm:     Rhythm: sinus rhythm    Ectopy:     Ectopy: none    QRS:     QRS axis:  Normal    QRS intervals:  Normal  Conduction:     Conduction: normal    ST segments:     ST segments:  Normal  T waves:     T waves: normal        ED Medication and Procedure Management   Prior to Admission Medications   Prescriptions Last  Dose Informant Patient Reported? Taking?   ammonium lactate (LAC-HYDRIN) 12 % cream   No No   Sig: Apply topically as needed for dry skin   cholecalciferol (VITAMIN D3) 1,000 units tablet  Self Yes No   Sig: Take 1,000 Units by mouth daily   lisinopril (ZESTRIL) 40 mg tablet   No No   Sig: Take 1 tablet (40 mg total) by mouth daily      Facility-Administered Medications: None     Current Discharge Medication List        CONTINUE these medications which have NOT CHANGED    Details   ammonium lactate (LAC-HYDRIN) 12 % cream Apply topically as needed for dry skin  Qty: 385 g, Refills: 1    Associated Diagnoses: Xerosis cutis      cholecalciferol (VITAMIN D3) 1,000 units tablet Take 1,000 Units by mouth daily      lisinopril (ZESTRIL) 40 mg tablet Take 1 tablet (40 mg total) by mouth daily  Qty: 90 tablet, Refills: 1    Associated Diagnoses: Essential hypertension           No discharge procedures on file.  ED SEPSIS DOCUMENTATION   Time reflects when diagnosis was documented in both MDM as applicable and the Disposition within this note       Time User Action Codes Description Comment    4/5/2025  8:20 PM Emmanuelle Marshall Add [K56.609] Intestinal obstruction, unspecified cause, unspecified whether partial or complete (HCC)     4/5/2025  9:23 PM Piero Munoz Add [R91.1] Lung nodule     4/5/2025  9:25 PM Chapincito Brown Add [R10.9] Acute abdominal pain     4/5/2025 11:08 PM Julia Murillo Modify [R10.9] Acute abdominal pain                  Piero Munoz DO  04/06/25 0934

## 2025-04-05 NOTE — ED NOTES
Family at bedside.      Annalisa Dunlap, RN  04/05/25 1951     Troponin T 88-->93. likely demand ischemia iso hypotension. Denies any CP, chest discomfort, abdominal pain. EKG with NSR and ST elevation in V2 unchanged from prior EKGs.  Troponin T 71 11/14    - Downtrending Home regiment: lantus 23U when not eating, admelog 14U for regular meal.  Pt now with borderline low blood glucose    - C/w lantus 26u units  - Decrease lispro 14u premeal  - miSS  - Monitor FS

## 2025-04-05 NOTE — ED ATTENDING ATTESTATION
I, Emmanuelle Marshall MD, saw and evaluated the patient. I have discussed the patient with the resident/non-physician practitioner and agree with the resident's/non-physician practitioner's findings, Plan of Care, and MDM as documented in the resident's/non-physician practitioner's note, except where noted. All available labs and Radiology studies were reviewed.  I was present for key portions of any procedure(s) performed by the resident/non-physician practitioner and I was immediately available to provide assistance.       At this point I agree with the current assessment done in the Emergency Department.  I have conducted an independent evaluation of this patient a history and physical is as follows:    Subjective: 75-year-old male with history of tobacco abuse, hypertension presenting via EMS for several hours of severe abdominal pain with associated nausea without vomiting or any other complaints.  Additional history limited by patient acuity.  Per EMS, patient had bradycardia and hypotension in the field but otherwise vital stable and received 50 of fentanyl x 2 without relief of symptoms.    Objective: Vitals with tachypnea, bradycardia, hypertension but otherwise stable and afebrile.  Diffusely tender abdomen with involuntary guarding without rebound.  Toxic-appearing patient.  Lungs clear to auscultation.  No murmur/rub/gallops on cardiac auscultation.  1+ DP pulses in right lower extremity, 2+ DP pulses in left lower extremity.  Sensation intact to light touch throughout in bilateral lower extremities.  Normal strength with dorsi/plantarflexion bilaterally.  Bilateral lower extremities are warm, dry without obvious color changes and equal with pitting edema bilaterally.    Assessment/Plan: Elderly male with history of hypertension, tobacco abuse presenting with severe abdominal pain with hypertension and bradycardia on exam.  Patient is bradycardic and hypertensive with some tachypnea and is in extremis with  involuntary guarding and asymmetric pedal pulses.  Differential includes but is not limited to dissection, AAA, mesenteric ischemia, bowel obstruction, bowel perforation, less likely pancreatitis, colitis, cholecystitis, appendicitis.  Patient taken emergently to CT scanner with imaging which is notable for no obvious dissection but concern for volvulus vs SBO on my read.  Radiology consulted for expedited read and surgery consulted for emergent evaluation of patient with surgical abdomen and abnormal CT imaging.  Lactic acid greater than 2.  Patient provided with IV fluids in addition to analgesia and antiemetics.    Patient taken emergently to the OR for ex lap.    Critical Care Time Statement: Upon my evaluation, this patient had a high probability of imminent or life-threatening deterioration due to lactic acidosis, peritonitis, bowel obstruction, which required my direct attention, intervention, and personal management.  I spent a total of 60 minutes directly providing critical care services, including interpretation of complex medical databases, evaluating for the presence of life-threatening injuries or illnesses, management of organ system failure(s) , complex medical decision making (to support/prevent further life-threatening deterioration)., and discussion with consulting providers. This time is exclusive of procedures, teaching, treating other patients, family meetings, and any prior time recorded by providers other than myself.

## 2025-04-06 PROBLEM — K56.609 SBO (SMALL BOWEL OBSTRUCTION) (HCC): Status: ACTIVE | Noted: 2025-04-06

## 2025-04-06 LAB
4HR DELTA HS TROPONIN: 2 NG/L
ALBUMIN SERPL BCG-MCNC: 3.3 G/DL (ref 3.5–5)
ALP SERPL-CCNC: 67 U/L (ref 34–104)
ALT SERPL W P-5'-P-CCNC: 5 U/L (ref 7–52)
ANION GAP SERPL CALCULATED.3IONS-SCNC: 5 MMOL/L (ref 4–13)
ANION GAP SERPL CALCULATED.3IONS-SCNC: 8 MMOL/L (ref 4–13)
AST SERPL W P-5'-P-CCNC: 10 U/L (ref 13–39)
ATRIAL RATE: 62 BPM
BASE EX.OXY STD BLDV CALC-SCNC: 66.4 % (ref 60–80)
BASE EXCESS BLDV CALC-SCNC: -2.6 MMOL/L
BASOPHILS # BLD AUTO: 0.05 THOUSANDS/ÂΜL (ref 0–0.1)
BASOPHILS # BLD AUTO: 0.06 THOUSANDS/ÂΜL (ref 0–0.1)
BASOPHILS NFR BLD AUTO: 0 % (ref 0–1)
BASOPHILS NFR BLD AUTO: 0 % (ref 0–1)
BILIRUB SERPL-MCNC: 0.27 MG/DL (ref 0.2–1)
BUN SERPL-MCNC: 22 MG/DL (ref 5–25)
BUN SERPL-MCNC: 23 MG/DL (ref 5–25)
CA-I BLD-SCNC: 1.1 MMOL/L (ref 1.12–1.32)
CALCIUM ALBUM COR SERPL-MCNC: 8.6 MG/DL (ref 8.3–10.1)
CALCIUM SERPL-MCNC: 8 MG/DL (ref 8.4–10.2)
CALCIUM SERPL-MCNC: 8.2 MG/DL (ref 8.4–10.2)
CARDIAC TROPONIN I PNL SERPL HS: 8 NG/L (ref ?–50)
CHLORIDE SERPL-SCNC: 104 MMOL/L (ref 96–108)
CHLORIDE SERPL-SCNC: 106 MMOL/L (ref 96–108)
CO2 SERPL-SCNC: 26 MMOL/L (ref 21–32)
CO2 SERPL-SCNC: 26 MMOL/L (ref 21–32)
CREAT SERPL-MCNC: 1.1 MG/DL (ref 0.6–1.3)
CREAT SERPL-MCNC: 1.11 MG/DL (ref 0.6–1.3)
EOSINOPHIL # BLD AUTO: 0.02 THOUSAND/ÂΜL (ref 0–0.61)
EOSINOPHIL # BLD AUTO: 0.58 THOUSAND/ÂΜL (ref 0–0.61)
EOSINOPHIL NFR BLD AUTO: 0 % (ref 0–6)
EOSINOPHIL NFR BLD AUTO: 3 % (ref 0–6)
ERYTHROCYTE [DISTWIDTH] IN BLOOD BY AUTOMATED COUNT: 13.9 % (ref 11.6–15.1)
ERYTHROCYTE [DISTWIDTH] IN BLOOD BY AUTOMATED COUNT: 14.1 % (ref 11.6–15.1)
GFR SERPL CREATININE-BSD FRML MDRD: 64 ML/MIN/1.73SQ M
GFR SERPL CREATININE-BSD FRML MDRD: 64 ML/MIN/1.73SQ M
GLUCOSE SERPL-MCNC: 130 MG/DL (ref 65–140)
GLUCOSE SERPL-MCNC: 132 MG/DL (ref 65–140)
GLUCOSE SERPL-MCNC: 135 MG/DL (ref 65–140)
HCO3 BLDV-SCNC: 26.4 MMOL/L (ref 24–30)
HCT VFR BLD AUTO: 37.1 % (ref 36.5–49.3)
HCT VFR BLD AUTO: 37.7 % (ref 36.5–49.3)
HGB BLD-MCNC: 11.7 G/DL (ref 12–17)
HGB BLD-MCNC: 11.9 G/DL (ref 12–17)
IMM GRANULOCYTES # BLD AUTO: 0.09 THOUSAND/UL (ref 0–0.2)
IMM GRANULOCYTES # BLD AUTO: 0.11 THOUSAND/UL (ref 0–0.2)
IMM GRANULOCYTES NFR BLD AUTO: 1 % (ref 0–2)
IMM GRANULOCYTES NFR BLD AUTO: 1 % (ref 0–2)
LACTATE SERPL-SCNC: 1.6 MMOL/L (ref 0.5–2)
LACTATE SERPL-SCNC: 2.4 MMOL/L (ref 0.5–2)
LYMPHOCYTES # BLD AUTO: 0.3 THOUSANDS/ÂΜL (ref 0.6–4.47)
LYMPHOCYTES # BLD AUTO: 0.34 THOUSANDS/ÂΜL (ref 0.6–4.47)
LYMPHOCYTES NFR BLD AUTO: 2 % (ref 14–44)
LYMPHOCYTES NFR BLD AUTO: 2 % (ref 14–44)
MAGNESIUM SERPL-MCNC: 1.5 MG/DL (ref 1.9–2.7)
MAGNESIUM SERPL-MCNC: 1.6 MG/DL (ref 1.9–2.7)
MCH RBC QN AUTO: 29 PG (ref 26.8–34.3)
MCH RBC QN AUTO: 29 PG (ref 26.8–34.3)
MCHC RBC AUTO-ENTMCNC: 31.5 G/DL (ref 31.4–37.4)
MCHC RBC AUTO-ENTMCNC: 31.6 G/DL (ref 31.4–37.4)
MCV RBC AUTO: 92 FL (ref 82–98)
MCV RBC AUTO: 92 FL (ref 82–98)
MONOCYTES # BLD AUTO: 0.35 THOUSAND/ÂΜL (ref 0.17–1.22)
MONOCYTES # BLD AUTO: 0.47 THOUSAND/ÂΜL (ref 0.17–1.22)
MONOCYTES NFR BLD AUTO: 2 % (ref 4–12)
MONOCYTES NFR BLD AUTO: 3 % (ref 4–12)
NEUTROPHILS # BLD AUTO: 16.04 THOUSANDS/ÂΜL (ref 1.85–7.62)
NEUTROPHILS # BLD AUTO: 16.86 THOUSANDS/ÂΜL (ref 1.85–7.62)
NEUTS SEG NFR BLD AUTO: 91 % (ref 43–75)
NEUTS SEG NFR BLD AUTO: 95 % (ref 43–75)
NRBC BLD AUTO-RTO: 0 /100 WBCS
NRBC BLD AUTO-RTO: 0 /100 WBCS
O2 CT BLDV-SCNC: 11.7 ML/DL
P AXIS: 67 DEGREES
PCO2 BLDV: 67.1 MM HG (ref 42–50)
PH BLDV: 7.21 [PH] (ref 7.3–7.4)
PHOSPHATE SERPL-MCNC: 3 MG/DL (ref 2.3–4.1)
PLATELET # BLD AUTO: 212 THOUSANDS/UL (ref 149–390)
PLATELET # BLD AUTO: 215 THOUSANDS/UL (ref 149–390)
PMV BLD AUTO: 9.4 FL (ref 8.9–12.7)
PMV BLD AUTO: 9.5 FL (ref 8.9–12.7)
PO2 BLDV: 38.1 MM HG (ref 35–45)
POTASSIUM SERPL-SCNC: 4.3 MMOL/L (ref 3.5–5.3)
POTASSIUM SERPL-SCNC: 4.4 MMOL/L (ref 3.5–5.3)
PR INTERVAL: 212 MS
PROT SERPL-MCNC: 5.9 G/DL (ref 6.4–8.4)
QRS AXIS: 46 DEGREES
QRSD INTERVAL: 100 MS
QT INTERVAL: 444 MS
QTC INTERVAL: 450 MS
RBC # BLD AUTO: 4.04 MILLION/UL (ref 3.88–5.62)
RBC # BLD AUTO: 4.1 MILLION/UL (ref 3.88–5.62)
SODIUM SERPL-SCNC: 137 MMOL/L (ref 135–147)
SODIUM SERPL-SCNC: 138 MMOL/L (ref 135–147)
T WAVE AXIS: 81 DEGREES
VENTRICULAR RATE: 62 BPM
WBC # BLD AUTO: 17.53 THOUSAND/UL (ref 4.31–10.16)
WBC # BLD AUTO: 17.74 THOUSAND/UL (ref 4.31–10.16)

## 2025-04-06 PROCEDURE — 85025 COMPLETE CBC W/AUTO DIFF WBC: CPT | Performed by: SURGERY

## 2025-04-06 PROCEDURE — 99291 CRITICAL CARE FIRST HOUR: CPT | Performed by: SURGERY

## 2025-04-06 PROCEDURE — 82805 BLOOD GASES W/O2 SATURATION: CPT | Performed by: SURGERY

## 2025-04-06 PROCEDURE — 83735 ASSAY OF MAGNESIUM: CPT | Performed by: SURGERY

## 2025-04-06 PROCEDURE — 80048 BASIC METABOLIC PNL TOTAL CA: CPT | Performed by: SURGERY

## 2025-04-06 PROCEDURE — 82948 REAGENT STRIP/BLOOD GLUCOSE: CPT

## 2025-04-06 PROCEDURE — 84484 ASSAY OF TROPONIN QUANT: CPT

## 2025-04-06 PROCEDURE — 93010 ELECTROCARDIOGRAM REPORT: CPT | Performed by: STUDENT IN AN ORGANIZED HEALTH CARE EDUCATION/TRAINING PROGRAM

## 2025-04-06 PROCEDURE — 83605 ASSAY OF LACTIC ACID: CPT | Performed by: SURGERY

## 2025-04-06 PROCEDURE — 80053 COMPREHEN METABOLIC PANEL: CPT | Performed by: PHYSICIAN ASSISTANT

## 2025-04-06 PROCEDURE — 84100 ASSAY OF PHOSPHORUS: CPT | Performed by: SURGERY

## 2025-04-06 PROCEDURE — 82330 ASSAY OF CALCIUM: CPT | Performed by: PHYSICIAN ASSISTANT

## 2025-04-06 RX ORDER — FENTANYL CITRATE/PF 50 MCG/ML
25 SYRINGE (ML) INJECTION
Status: DISCONTINUED | OUTPATIENT
Start: 2025-04-06 | End: 2025-04-07

## 2025-04-06 RX ORDER — ONDANSETRON 2 MG/ML
INJECTION INTRAMUSCULAR; INTRAVENOUS AS NEEDED
Status: DISCONTINUED | OUTPATIENT
Start: 2025-04-06 | End: 2025-04-06

## 2025-04-06 RX ORDER — MAGNESIUM SULFATE HEPTAHYDRATE 40 MG/ML
2 INJECTION, SOLUTION INTRAVENOUS ONCE
Status: COMPLETED | OUTPATIENT
Start: 2025-04-06 | End: 2025-04-06

## 2025-04-06 RX ORDER — HYDROMORPHONE HCL/PF 1 MG/ML
0.5 SYRINGE (ML) INJECTION
Status: DISCONTINUED | OUTPATIENT
Start: 2025-04-06 | End: 2025-04-07

## 2025-04-06 RX ORDER — CHLORHEXIDINE GLUCONATE ORAL RINSE 1.2 MG/ML
15 SOLUTION DENTAL EVERY 12 HOURS SCHEDULED
Status: DISCONTINUED | OUTPATIENT
Start: 2025-04-06 | End: 2025-04-11 | Stop reason: HOSPADM

## 2025-04-06 RX ORDER — SODIUM CHLORIDE, SODIUM GLUCONATE, SODIUM ACETATE, POTASSIUM CHLORIDE, MAGNESIUM CHLORIDE, SODIUM PHOSPHATE, DIBASIC, AND POTASSIUM PHOSPHATE .53; .5; .37; .037; .03; .012; .00082 G/100ML; G/100ML; G/100ML; G/100ML; G/100ML; G/100ML; G/100ML
125 INJECTION, SOLUTION INTRAVENOUS CONTINUOUS
Status: DISCONTINUED | OUTPATIENT
Start: 2025-04-06 | End: 2025-04-07

## 2025-04-06 RX ORDER — ONDANSETRON 2 MG/ML
4 INJECTION INTRAMUSCULAR; INTRAVENOUS ONCE AS NEEDED
Status: DISCONTINUED | OUTPATIENT
Start: 2025-04-06 | End: 2025-04-07

## 2025-04-06 RX ORDER — ACETAMINOPHEN 10 MG/ML
1000 INJECTION, SOLUTION INTRAVENOUS EVERY 6 HOURS SCHEDULED
Status: DISCONTINUED | OUTPATIENT
Start: 2025-04-06 | End: 2025-04-09

## 2025-04-06 RX ORDER — BUPIVACAINE HYDROCHLORIDE 2.5 MG/ML
INJECTION, SOLUTION EPIDURAL; INFILTRATION; INTRACAUDAL; PERINEURAL AS NEEDED
Status: DISCONTINUED | OUTPATIENT
Start: 2025-04-06 | End: 2025-04-06 | Stop reason: HOSPADM

## 2025-04-06 RX ORDER — HYDRALAZINE HYDROCHLORIDE 20 MG/ML
10 INJECTION INTRAMUSCULAR; INTRAVENOUS EVERY 4 HOURS PRN
Status: DISCONTINUED | OUTPATIENT
Start: 2025-04-06 | End: 2025-04-11 | Stop reason: HOSPADM

## 2025-04-06 RX ORDER — METRONIDAZOLE 500 MG/100ML
500 INJECTION, SOLUTION INTRAVENOUS EVERY 12 HOURS
Status: COMPLETED | OUTPATIENT
Start: 2025-04-06 | End: 2025-04-09

## 2025-04-06 RX ADMIN — METRONIDAZOLE 500 MG: 500 INJECTION, SOLUTION INTRAVENOUS at 09:31

## 2025-04-06 RX ADMIN — HYDROMORPHONE HYDROCHLORIDE 0.2 MG: 1 INJECTION, SOLUTION INTRAMUSCULAR; INTRAVENOUS; SUBCUTANEOUS at 01:11

## 2025-04-06 RX ADMIN — MAGNESIUM SULFATE HEPTAHYDRATE 2 G: 40 INJECTION, SOLUTION INTRAVENOUS at 11:06

## 2025-04-06 RX ADMIN — CHLORHEXIDINE GLUCONATE 15 ML: 1.2 SOLUTION ORAL at 00:59

## 2025-04-06 RX ADMIN — ACETAMINOPHEN 1000 MG: 10 INJECTION INTRAVENOUS at 18:28

## 2025-04-06 RX ADMIN — HEPARIN SODIUM 5000 UNITS: 5000 INJECTION INTRAVENOUS; SUBCUTANEOUS at 13:37

## 2025-04-06 RX ADMIN — ACETAMINOPHEN 1000 MG: 10 INJECTION INTRAVENOUS at 01:02

## 2025-04-06 RX ADMIN — SODIUM CHLORIDE, SODIUM GLUCONATE, SODIUM ACETATE, POTASSIUM CHLORIDE, MAGNESIUM CHLORIDE, SODIUM PHOSPHATE, DIBASIC, AND POTASSIUM PHOSPHATE 125 ML/HR: .53; .5; .37; .037; .03; .012; .00082 INJECTION, SOLUTION INTRAVENOUS at 01:00

## 2025-04-06 RX ADMIN — CHLORHEXIDINE GLUCONATE 15 ML: 1.2 SOLUTION ORAL at 21:50

## 2025-04-06 RX ADMIN — ONDANSETRON 4 MG: 2 INJECTION INTRAMUSCULAR; INTRAVENOUS at 00:15

## 2025-04-06 RX ADMIN — HEPARIN SODIUM 5000 UNITS: 5000 INJECTION INTRAVENOUS; SUBCUTANEOUS at 05:53

## 2025-04-06 RX ADMIN — SODIUM CHLORIDE, SODIUM GLUCONATE, SODIUM ACETATE, POTASSIUM CHLORIDE, MAGNESIUM CHLORIDE, SODIUM PHOSPHATE, DIBASIC, AND POTASSIUM PHOSPHATE 125 ML/HR: .53; .5; .37; .037; .03; .012; .00082 INJECTION, SOLUTION INTRAVENOUS at 22:07

## 2025-04-06 RX ADMIN — ACETAMINOPHEN 1000 MG: 10 INJECTION INTRAVENOUS at 05:53

## 2025-04-06 RX ADMIN — CHLORHEXIDINE GLUCONATE 15 ML: 1.2 SOLUTION ORAL at 09:35

## 2025-04-06 RX ADMIN — ACETAMINOPHEN 1000 MG: 10 INJECTION INTRAVENOUS at 12:59

## 2025-04-06 RX ADMIN — METRONIDAZOLE 500 MG: 500 INJECTION, SOLUTION INTRAVENOUS at 21:51

## 2025-04-06 RX ADMIN — Medication: at 01:38

## 2025-04-06 RX ADMIN — SUGAMMADEX 200 MG: 100 INJECTION, SOLUTION INTRAVENOUS at 00:18

## 2025-04-06 RX ADMIN — CEFTRIAXONE SODIUM 1000 MG: 10 INJECTION, POWDER, FOR SOLUTION INTRAVENOUS at 01:05

## 2025-04-06 RX ADMIN — HEPARIN SODIUM 5000 UNITS: 5000 INJECTION INTRAVENOUS; SUBCUTANEOUS at 21:50

## 2025-04-06 NOTE — ANESTHESIA POSTPROCEDURE EVALUATION
Post-Op Assessment Note    CV Status:  Stable    Pain management: adequate       Mental Status:  Alert and awake   Hydration Status:  Euvolemic   PONV Controlled:  Controlled   Airway Patency:  Patent     Post Op Vitals Reviewed: Yes    No anethesia notable event occurred.    Staff: Anesthesiologist, CRNA           Last Filed PACU Vitals:  Vitals Value Taken Time   Temp 97.2 °F (36.2 °C) 04/06/25 0111   Pulse 52 04/06/25 0645   /76 04/06/25 0630   Resp 11 04/06/25 0645   SpO2 100 % 04/06/25 0645   Vitals shown include unfiled device data.

## 2025-04-06 NOTE — ASSESSMENT & PLAN NOTE
74yo M with PMHx prostate cancer s/p resection and radiation (2011), HTN, cholecystectomy, current smoker (2 ppd) presenting with acute onset abdominal pain.  CT scan concerning for ischemic small bowel, although other differentials note excluded.    Patient with pain out of proportion.   WBC 12.8  Lactate 2.3    Plan  OR for exploratory laparotomy  Consent obtained from spouse  Post op orders pending intraoperative findings.

## 2025-04-06 NOTE — ASSESSMENT & PLAN NOTE
76yo M with PMHx prostate cancer s/p resection and radiation (2011), HTN, cholecystectomy, current smoker (2 ppd) presenting with acute onset abdominal pain.  CT scan concerning for ischemic small bowel, although other differentials note excluded.    Patient with pain out of proportion.   WBC 12.8  Lactate 2.3    Plan  See above

## 2025-04-06 NOTE — PLAN OF CARE
Problem: Prexisting or High Potential for Compromised Skin Integrity  Goal: Skin integrity is maintained or improved  Description: INTERVENTIONS:- Identify patients at risk for skin breakdown- Assess and monitor skin integrity- Assess and monitor nutrition and hydration status- Monitor labs - Assess for incontinence - Turn and reposition patient- Assist with mobility/ambulation- Relieve pressure over bony prominences- Avoid friction and shearing- Provide appropriate hygiene as needed including keeping skin clean and dry- Evaluate need for skin moisturizer/barrier cream- Collaborate with interdisciplinary team - Patient/family teaching- Consider wound care consult   Outcome: Progressing

## 2025-04-06 NOTE — ANESTHESIA PREPROCEDURE EVALUATION
Procedure:  LAPAROTOMY EXPLORATORY (Abdomen)  LAPAROTOMY EXPLORATORY W/ BOWEL RESECTION (Abdomen)    Relevant Problems   CARDIO   (+) Essential hypertension      /RENAL   (+) Prostate cancer (HCC)   (+) Stage 3b chronic kidney disease (CKD) (HCC)      Daily smoker    Lactate 2.3, K 4.0    Physical Exam    Airway    Mallampati score: II         Dental    upper dentures and lower dentures    Cardiovascular  Cardiovascular exam normal    Pulmonary  Pulmonary exam normal     Other Findings        Anesthesia Plan  ASA Score- 2 Emergent    Anesthesia Type- general with ASA Monitors.         Additional Monitors:     Airway Plan: ETT.           Plan Factors-    Chart reviewed. EKG reviewed.  Existing labs reviewed. Patient summary reviewed.                  Induction- intravenous and rapid sequence induction.    Postoperative Plan-         Informed Consent- Anesthetic plan and risks discussed with patient.  I personally reviewed this patient with the CRNA. Discussed and agreed on the Anesthesia Plan with the CRNA..      NPO Status:  No vitals data found for the desired time range.

## 2025-04-06 NOTE — ASSESSMENT & PLAN NOTE
Pt presented with severe conversation limiting abdominal pain that started abruptly 2 hours prior to presentation  Imaging concerning for small bowel ischemia   Now s/p exploratory laparotomy with 30cm resention and anastomosis of terminal ileum  NGT in place, continue to low continuous suction  Maintain NPO   Continue maintenance fluids with isolyte at 125mL/hr

## 2025-04-06 NOTE — CONSULTS
Consultation - Critical Care/ICU   Name: Nav Infante 75 y.o. male I MRN: [id number]  Unit/Bed#: ICU 06 I Date of Admission: 4/5/2025   Date of Service: 4/6/2025 I Hospital Day: 1   Consults  Physician Requesting Evaluation: Hilario Singh,*   Reason for Evaluation / Principal Problem: SBO s/p Ex Lap with small bowel R&A        I have discussed the above management plan in detail with the primary service.     Assessment & Plan  Acute abdominal pain  Pt presented with severe conversation limiting abdominal pain that started abruptly 2 hours prior to presentation  Imaging concerning for small bowel ischemia   Now s/p exploratory laparotomy with 30cm resention and anastomosis of terminal ileum  NGT in place, continue to low continuous suction  Maintain NPO   Continue maintenance fluids with isolyte at 125mL/hr  Essential hypertension  Holding PTA Lisinopril given concern for possible hemodynamic compromise with need for pressor support during surgery, restart as able  PRN labetalol, or hydralazine for SBP >160   Stage 3b chronic kidney disease (CKD) ([facility])  Lab Results   Component Value Date    EGFR 64 04/06/2025    EGFR 64 04/06/2025    EGFR 54 04/05/2025    CREATININE 1.11 04/06/2025    CREATININE 1.10 04/06/2025    CREATININE 1.27 04/05/2025     Baseline Cr appears to be around 1.1, at baseline   Trend labs and manage electrolytes PRN  Disposition: Stepdown Level 1    History of Present Illness   Nav Infante is a 75 y.o. without any significant PMH presented with severe, conversation limiting abdominal pain starting 2 hours prior to presentation. CT concerning for small bowel ischemia. Pt emergently taken to OR for ex lap with small bowel resection and anastamosis. Upon arrival to the unit, patient appears comfortable. Denies any abdominal pain, no nausea/vomiting. Reports some throat discomfort from procedural intubation. No other complaints.     History obtained from chart review and the  patient.  Review of Systems: Review of Systems   Constitutional: Negative.    HENT: Negative.     Respiratory: Negative.     Cardiovascular: Negative.    Gastrointestinal:  Positive for abdominal pain. Negative for abdominal distention, diarrhea and vomiting.        Mild cheikh incisional abdominal pain with palpation   Genitourinary: Negative.    Musculoskeletal: Negative.    Skin: Negative.    Neurological: Negative.    Psychiatric/Behavioral: Negative.         Historical Information   Past Medical History:  No date: Cancer ([facility])      Comment:  PROSTATE  2/4/2018: Essential hypertension  No date: Femur fracture, right (HCC)  10/11/2016: Gastritis  10/11/2016: Tobacco abuse Past Surgical History:  No date: CHOLECYSTECTOMY  No date: FRACTURE SURGERY  2/5/2018: TX OPTX FEM SHFT FX W/INSJ IMED IMPLT W/WO SCREW; Left      Comment:  Procedure: LEFT HIP SHORT TROCHANTERIC FEMORAL NAIL;                 Surgeon: Ej Hall MD;  Location: [facility];                 Service: Orthopedics  2011: PROSTATECTOMY   Current Outpatient Medications   Medication Instructions    ammonium lactate (LAC-HYDRIN) 12 % cream Topical, As needed    cholecalciferol (VITAMIN D3) 1,000 Units, Daily    lisinopril (ZESTRIL) 40 mg, Oral, Daily    No Known Allergies   Social History     Tobacco Use    Smoking status: Every Day     Types: Pipe    Smokeless tobacco: Never    Tobacco comments:     smokes pipe tabacco    Vaping Use    Vaping status: Never Used   Substance Use Topics    Alcohol use: No    Drug use: No    Family History   Problem Relation Age of Onset    Stroke Mother     Stroke Father     Hypertension Father           Objective :                   Vitals I/O      Most Recent Min/Max in 24hrs   Temp (!) 97.2 °F (36.2 °C) Temp  Min: 97.2 °F (36.2 °C)  Max: 97.6 °F (36.4 °C)   Pulse (!) 51 Pulse  Min: 20  Max: 69   Resp 14 Resp  Min: 10  Max: 26   /79 BP  Min: 157/79  Max: 218/96   O2 Sat 100 % SpO2  Min: 90 %  Max: 100 %       Intake/Output Summary (Last 24 hours) at 4/6/2025 0528  Last data filed at 4/6/2025 0338  Gross per 24 hour   Intake 3450 ml   Output 775 ml   Net 2675 ml       Diet NPO    Invasive Monitoring           Physical Exam   Physical Exam  Vitals and nursing note reviewed.   Eyes:      Conjunctiva/sclera: Conjunctivae normal.      Pupils: Pupils are equal, round, and reactive to light.   Skin:     General: Skin is warm and dry.   HENT:      Head: Normocephalic and atraumatic.      Nose:      Comments: NGT in place     Mouth/Throat:      Mouth: Mucous membranes are moist.      Pharynx: Oropharynx is clear.   Cardiovascular:      Rate and Rhythm: Normal rate and regular rhythm.   Musculoskeletal:      Cervical back: Neck supple.   Abdominal: General: There is no distension.      Palpations: Abdomen is soft.      Tenderness: There is abdominal tenderness.      Comments: Mild cheikh incisional tenderness with palpation, bandage in place appears CDI   Constitutional:       General: He is not in acute distress.     Appearance: He is well-developed and well-nourished. He is not ill-appearing.   Pulmonary:      Effort: Pulmonary effort is normal.      Breath sounds: Normal breath sounds.   Psychiatric:         Behavior: Behavior is cooperative.   Neurological:      General: No focal deficit present.      Mental Status: He is alert, oriented to person, place, and time and oriented to person, place and time.      GCS: GCS eye subscore is 4. GCS verbal subscore is 5. GCS motor subscore is 6.      Motor: Strength full and intact in all extremities.          Diagnostic Studies        Lab Results: I have reviewed the following results:     Medications:  Scheduled PRN   acetaminophen, 1,000 mg, Q6H PATRICE  cefTRIAXone, 1,000 mg, Q24H  chlorhexidine, 15 mL, Q12H PATRICE  heparin (porcine), 5,000 Units, Q8H PATRICE  metroNIDAZOLE, 500 mg, Q12H  nicotine, 1 patch, Daily      hydrALAZINE, 10 mg, Q4H PRN  HYDROmorphone, 0.2 mg, Q3H PRN  labetalol, 10  mg, Q4H PRN  ondansetron, 4 mg, Q6H PRN  oxyCODONE, 5 mg, Q6H PRN  oxyCODONE, 2.5 mg, Q6H PRN       Continuous    HYDROmorphone,   multi-electrolyte, 125 mL/hr, Last Rate: 125 mL/hr (04/06/25 0100)         Labs:   CBC    Recent Labs     04/06/25 0050 04/06/25 0348   WBC 17.74* 17.53*   HGB 11.9* 11.7*   HCT 37.7 37.1    215     BMP    Recent Labs     04/06/25 0050 04/06/25  0348   SODIUM 137 138   K 4.4 4.3    104   CO2 26 26   AGAP 5 8   BUN 22 23   CREATININE 1.10 1.11   CALCIUM 8.0* 8.2*       Coags    Recent Labs     04/05/25 1935   INR 1.02   PTT 27        Additional Electrolytes  Recent Labs     04/06/25 0050 04/06/25  0348   MG 1.5* 1.6*   PHOS 3.0  --    CAIONIZED 1.10*  --           Blood Gas    No recent results  Recent Labs     04/06/25 0055   PHVEN 7.213*   KTH1EIU 67.1*   PO2VEN 38.1   NFV4HUC 26.4   BEVEN -2.6   J4YRWXF 66.4    LFTs  Recent Labs     04/05/25 1935 04/06/25  0050   ALT 6* 5*   AST 11* 10*   ALKPHOS 86 67   ALB 3.9 3.3*   TBILI 0.43 0.27       Infectious  No recent results  Glucose  Recent Labs     04/05/25 1935 04/06/25 0050 04/06/25  0348   GLUC 169* 135 130

## 2025-04-06 NOTE — H&P
H&P - Surgery-General   Name: Nav Infante 75 y.o. male I MRN: [id number]  Unit/Bed#: [facility] I Date of Admission: 4/5/2025   Date of Service: 4/5/2025 I Hospital Day: 0     Assessment & Plan  Acute abdominal pain  74yo M with PMHx prostate cancer s/p resection and radiation (2011), HTN, cholecystectomy, current smoker (2 ppd) presenting with acute onset abdominal pain.  CT scan concerning for ischemic small bowel, although other differentials note excluded.    Patient with pain out of proportion.   WBC 12.8  Lactate 2.3    Plan  OR for exploratory laparotomy  Consent obtained from spouse  Post op orders pending intraoperative findings.     History of Present Illness   Nav Infante is a 75 y.o. male with above history who presents with abdominal pain started 2 hours prior to ED presentation. Patient unable to answer questions 2/2 pain and respiratory distress- history obtained from spouse. Patient was in normal state of health until abruptly developed pain, no nausea or vomiting. He has respiratory distress at baseline but no diagnosed pulmonary disorder and does not use oxygen at home. He has a poor appetite at baseline only eating pretzels and cream cheese. He smokes over 2 packs of cigarettes per day which are hand roled with pipe tobacco. He has never had a colonoscopy. He does not take AC/AP.    Review of Systems  Medical History Review: I have reviewed the patient's PMH, PSH, Social History, Family History, Meds, and Allergies     Objective :  Temp:  [97.6 °F (36.4 °C)] 97.6 °F (36.4 °C)  HR:  [58-69] 67  BP: (192-218)/(79-96) 192/79  Resp:  [26] 26  SpO2:  [90 %-99 %] 99 %  O2 Device: Nasal cannula  Nasal Cannula O2 Flow Rate (L/min):  [2 L/min] 2 L/min      Physical Exam  General: in acute distress  HENT: NCAT MMM  Neck: supple, no JVD  CV: nl rate  Lungs: increased work of breathing, tachypnea.  ABD: Soft, severe diffuse tenderness most pronounced in LLQ, nondistended  Neuro: alert, not answering  questions.      Lab Results: I have reviewed the following results:  Recent Labs     04/05/25 1935 04/05/25 2112   WBC 12.85*  --    HGB 12.3  --    HCT 38.7  --      --    SODIUM 139  --    K 4.0  --      --    CO2 26  --    BUN 26*  --    CREATININE 1.27  --    GLUC 169*  --    AST 11*  --    ALT 6*  --    ALB 3.9  --    TBILI 0.43  --    ALKPHOS 86  --    PTT 27  --    INR 1.02  --    HSTNI0 6  --    HSTNI2  --  4   LACTICACID 2.3*  --              VTE Pharmacologic Prophylaxis: VTE covered by:    None     VTE Mechanical Prophylaxis: sequential compression device

## 2025-04-06 NOTE — OP NOTE
OPERATIVE REPORT  PATIENT NAME: Nav Infante    :  1950  MRN: [id number]  Pt Location: [facility] OR ROOM 01    SURGERY DATE: 2025    Surgeons and Role:     * Hilario Singh DO - Primary     * Mayo Avila MD - Assisting     * Chapincito Brown MD - Assisting    Preop Diagnosis:  Acute abdominal pain [R10.9]    Post-Op Diagnosis Codes:     * Acute abdominal pain [R10.9]    Procedure(s):  LAPAROTOMY EXPLORATORY  LAPAROTOMY EXPLORATORY W/ SMALL BOWEL RESECTION WITH ANASTOMOSIS    Specimen(s):  ID Type Source Tests Collected by Time Destination   1 : ileum Tissue Small Bowel, NOS TISSUE EXAM Hliario Singh DO 2025 [id number]        Estimated Blood Loss:   Minimal    Drains:  NG/OG/Enteral Tube Nasogastric 18 Fr Right nare (Active)   Number of days: 1       Urethral Catheter Latex 16 Fr. (Active)   Number of days: 1       Anesthesia Type:   Choice    Operative Indications:  Acute abdominal pain [R10.9]    Operative Findings:  Exploratory laparotomy, small bowel resection, primary anastomosis  Closed-loop small bowel obstruction causing small bowel ischemia in the pelvis from single adhesive band, approximately 30 cm of ileum nonviable and resected, anastomosis approximately 10cm from the ileocecal valve    Complications:   None    Procedure and Technique:  Patient was brought into the operating room and identity and procedure were confirmed.  Patient was placed supine on the operating room table and general anesthesia was induced.  The abdomen was prepped and draped in the usual sterile fashion.  Timeout is performed and all parties in agreement.  Vertical midline skin incision was made around the umbilicus using a 10 blade scalpel.  Dissection was carried down through dermis and subcutaneous tissues using Bovie electrocautery.  Fascia was scored at the inferior aspect of the incision.  The posterior fascia was grasped tween 2 hemostats and pulled upwards and divided in the midline using  Metzenbaum scissors.  The peritoneum was then punctured using finger fracture technique.  The peritoneum and fascia were then fully opened along the length of the incision using Bovie electrocautery using 2 fingers under the peritoneum to protect the intra-abdominal contents.  There were some omental adhesions at the upper aspect of the incision which were taken down using Bovie electrocautery.  A wound protector was placed.  The small bowel was then run from the ligament of Treitz to the terminal ileum.  A closed-loop small bowel obstruction from a single adhesive band in the right pelvis was noted.  This was lysed and the small bowel was inspected.  Approximately 30 cm of terminal ileum appeared ischemic.  This was observed over 10 minutes and found to be not peristalsing so decision was made to perform a small bowel resection.  Mesenteric was not note was created proximal and distal to the area of nonviable bowel using a hemostat.  The bowel was transected using KAIT 100 staplers.  The mesentery was divided using LigaSure device.  The specimen was then passed off the field.  After discussion with anesthesia the patient was found to be stable so decision was made to perform a primary anastomosis.  The 2 limbs of bowel were aligned using 2 interrupted 3-0 silk sutures.  The antimesenteric corner of the previous staple lines were cut off and common enterotomy was created using a KAIT 100 stapler.  The open end of the common enterotomy was grasped using Allis clamps and the common enterotomy was closed using a TA 60 stapler.  The staple line was then oversewn with 3-0 silk Lembert sutures.  A 3-0 silk apex stitch was placed.  The mesenteric defect was closed using running 3-0 Vicryl suture.  The abdomen was copiously irrigated with 3 L normal saline and suctioned out.  NG tube was confirmed in the stomach.  The fascia was then closed using running #1 PDS.  Subcutaneous tissues were copiously irrigated with normal  saline and suctioned out.  The skin was then closed with staples.  The abdomen was washed and dried and Mepilex dressing was applied.  The patient was then awakened in the operating room and returned to the ICU in stable condition having tolerated the procedure well.  Dr. Singh was present for all critical portions of the procedure.    Patient Disposition:  Critical Care Unit    This procedure was not performed to treat colon cancer through resection           SIGNATURE: Mayo Avila MD  DATE: April 6, 2025  TIME: 12:31 AM

## 2025-04-06 NOTE — ASSESSMENT & PLAN NOTE
Lab Results   Component Value Date    EGFR 64 04/06/2025    EGFR 64 04/06/2025    EGFR 54 04/05/2025    CREATININE 1.11 04/06/2025    CREATININE 1.10 04/06/2025    CREATININE 1.27 04/05/2025     Baseline Cr appears to be around 1.1, at baseline   Trend labs and manage electrolytes PRN

## 2025-04-06 NOTE — ASSESSMENT & PLAN NOTE
76yo M with PMHx prostate cancer s/p resection and radiation (2011), HTN, cholecystectomy, current smoker (2 ppd) presenting with acute onset abdominal pain.  CT scan concerning for ischemic small bowel, although other differentials note excluded.    S/p ex lap, SBR. Closed loop obstruction 2/2 adhesive band found intraoperatively with compromised segment of SB.    Plan  NPO/NGT  IV fluids  IV abx to complete 4 day total course  Prn analgesia and antiemetics  D/c nicole  DVT ppx  Encourage OOB, ambulation, IS  Ok for downgrade from ICU

## 2025-04-06 NOTE — ASSESSMENT & PLAN NOTE
Holding PTA Lisinopril given concern for possible hemodynamic compromise with need for pressor support during surgery, restart as able  PRN labetalol, or hydralazine for SBP >160

## 2025-04-06 NOTE — PROGRESS NOTES
Progress Note - Surgery-General   Name: Nav Infante 75 y.o. male I MRN: [id number]  Unit/Bed#: ICU 06 I Date of Admission: 4/5/2025   Date of Service: 4/6/2025 I Hospital Day: 1    Assessment & Plan  SBO (small bowel obstruction) (HCC)  74yo M with PMHx prostate cancer s/p resection and radiation (2011), HTN, cholecystectomy, current smoker (2 ppd) presenting with acute onset abdominal pain.  CT scan concerning for ischemic small bowel, although other differentials note excluded.    S/p ex lap, SBR. Closed loop obstruction 2/2 adhesive band found intraoperatively with compromised segment of SB.    Plan  NPO/NGT  IV fluids  IV abx to complete 4 day total course  Prn analgesia and antiemetics  D/c nicole  DVT ppx  Encourage OOB, ambulation, IS  Ok for downgrade from ICU  Acute abdominal pain  74yo M with PMHx prostate cancer s/p resection and radiation (2011), HTN, cholecystectomy, current smoker (2 ppd) presenting with acute onset abdominal pain.  CT scan concerning for ischemic small bowel, although other differentials note excluded.    Patient with pain out of proportion.   WBC 12.8  Lactate 2.3    Plan  See above  Essential hypertension    Stage 3b chronic kidney disease (CKD) (HCC)  Lab Results   Component Value Date    EGFR 64 04/06/2025    EGFR 64 04/06/2025    EGFR 54 04/05/2025    CREATININE 1.11 04/06/2025    CREATININE 1.10 04/06/2025    CREATININE 1.27 04/05/2025           Subjective   NAEON. Pain well controlled. Denies N/V. No flatus or BM. No acute complaints or concerns this morning.    Objective :  Temp:  [97.2 °F (36.2 °C)-97.6 °F (36.4 °C)] 97.2 °F (36.2 °C)  HR:  [20-69] 56  BP: (157-218)/(77-96) 164/84  Resp:  [10-26] 15  SpO2:  [90 %-100 %] 100 %  O2 Device: Nasal cannula  Nasal Cannula O2 Flow Rate (L/min):  [2 L/min] 2 L/min    I/O         04/04 0701  04/05 0700 04/05 0701 04/06 0700    I.V. (mL/kg)  3000 (42.3)    IV Piggyback  450    Total Intake(mL/kg)  3450 (48.7)    Urine (mL/kg/hr)   800    Emesis/NG output  350    Total Output  1150    Net  +2300                Lines/Drains/Airways       Active Status       Name Placement date Placement time Site Days    NG/OG/Enteral Tube Nasogastric 18 Fr Right nare 04/05/25  2345  Right nare  less than 1    Urethral Catheter Latex 16 Fr. 04/05/25  2235  Latex  less than 1                  Physical Exam  General: NAD  HENT: NCAT MMM  Neck: supple, no JVD  CV: nl rate  Lungs: nl wob. No resp distress  ABD: Soft, appropriately tender, nondistended. Incision CDI w/o strikethrough.  Extrem: No CCE  Neuro: AAOx3      Lab Results: I have reviewed the following results:  Recent Labs     04/05/25 1935 04/05/25 1935 04/05/25 2112 04/06/25  0050 04/06/25  0344 04/06/25  0348   WBC 12.85*  --   --  17.74*  --  17.53*   HGB 12.3  --   --  11.9*  --  11.7*   HCT 38.7  --   --  37.7  --  37.1     --   --  212  --  215   SODIUM 139  --   --  137  --  138   K 4.0  --   --  4.4  --  4.3     --   --  106  --  104   CO2 26  --   --  26  --  26   BUN 26*  --   --  22  --  23   CREATININE 1.27  --   --  1.10  --  1.11   GLUC 169*  --   --  135  --  130   CAIONIZED  --   --   --  1.10*  --   --    MG  --    < >  --  1.5*  --  1.6*   PHOS  --   --   --  3.0  --   --    AST 11*  --   --  10*  --   --    ALT 6*  --   --  5*  --   --    ALB 3.9  --   --  3.3*  --   --    TBILI 0.43  --   --  0.27  --   --    ALKPHOS 86  --   --  67  --   --    PTT 27  --   --   --   --   --    INR 1.02  --   --   --   --   --    HSTNI0 6  --   --   --   --   --    HSTNI2  --   --  4  --   --   --    LACTICACID 2.3*  --  6.6* 2.4* 1.6  --     < > = values in this interval not displayed.             VTE Pharmacologic Prophylaxis: VTE covered by:  heparin (porcine), Subcutaneous, 5,000 Units at 04/06/25 0553     VTE Mechanical Prophylaxis: sequential compression device   none

## 2025-04-06 NOTE — ASSESSMENT & PLAN NOTE
Lab Results   Component Value Date    EGFR 64 04/06/2025    EGFR 64 04/06/2025    EGFR 54 04/05/2025    CREATININE 1.11 04/06/2025    CREATININE 1.10 04/06/2025    CREATININE 1.27 04/05/2025

## 2025-04-07 PROBLEM — Z72.0 TOBACCO USE: Status: ACTIVE | Noted: 2025-04-07

## 2025-04-07 PROBLEM — A41.9 SEPSIS DUE TO UNDETERMINED ORGANISM (HCC): Status: ACTIVE | Noted: 2025-04-07

## 2025-04-07 PROBLEM — J40 BRONCHITIS: Status: ACTIVE | Noted: 2025-04-07

## 2025-04-07 PROBLEM — R91.1 LUNG NODULE: Status: ACTIVE | Noted: 2025-04-07

## 2025-04-07 LAB
ANION GAP SERPL CALCULATED.3IONS-SCNC: 6 MMOL/L (ref 4–13)
BASOPHILS # BLD AUTO: 0.05 THOUSANDS/ÂΜL (ref 0–0.1)
BASOPHILS NFR BLD AUTO: 0 % (ref 0–1)
BUN SERPL-MCNC: 25 MG/DL (ref 5–25)
CALCIUM SERPL-MCNC: 8.1 MG/DL (ref 8.4–10.2)
CHLORIDE SERPL-SCNC: 101 MMOL/L (ref 96–108)
CO2 SERPL-SCNC: 31 MMOL/L (ref 21–32)
CREAT SERPL-MCNC: 1.1 MG/DL (ref 0.6–1.3)
EOSINOPHIL # BLD AUTO: 0.06 THOUSAND/ÂΜL (ref 0–0.61)
EOSINOPHIL NFR BLD AUTO: 0 % (ref 0–6)
ERYTHROCYTE [DISTWIDTH] IN BLOOD BY AUTOMATED COUNT: 14.2 % (ref 11.6–15.1)
GFR SERPL CREATININE-BSD FRML MDRD: 64 ML/MIN/1.73SQ M
GLUCOSE SERPL-MCNC: 91 MG/DL (ref 65–140)
HCT VFR BLD AUTO: 32.7 % (ref 36.5–49.3)
HGB BLD-MCNC: 10.7 G/DL (ref 12–17)
IMM GRANULOCYTES # BLD AUTO: 0.11 THOUSAND/UL (ref 0–0.2)
IMM GRANULOCYTES NFR BLD AUTO: 1 % (ref 0–2)
LYMPHOCYTES # BLD AUTO: 1.25 THOUSANDS/ÂΜL (ref 0.6–4.47)
LYMPHOCYTES NFR BLD AUTO: 9 % (ref 14–44)
MAGNESIUM SERPL-MCNC: 2.3 MG/DL (ref 1.9–2.7)
MCH RBC QN AUTO: 29.2 PG (ref 26.8–34.3)
MCHC RBC AUTO-ENTMCNC: 32.7 G/DL (ref 31.4–37.4)
MCV RBC AUTO: 89 FL (ref 82–98)
MONOCYTES # BLD AUTO: 1.06 THOUSAND/ÂΜL (ref 0.17–1.22)
MONOCYTES NFR BLD AUTO: 8 % (ref 4–12)
NEUTROPHILS # BLD AUTO: 11.58 THOUSANDS/ÂΜL (ref 1.85–7.62)
NEUTS SEG NFR BLD AUTO: 82 % (ref 43–75)
NRBC BLD AUTO-RTO: 0 /100 WBCS
PLATELET # BLD AUTO: 211 THOUSANDS/UL (ref 149–390)
PMV BLD AUTO: 10 FL (ref 8.9–12.7)
POTASSIUM SERPL-SCNC: 4.1 MMOL/L (ref 3.5–5.3)
PROCALCITONIN SERPL-MCNC: 1.04 NG/ML
RBC # BLD AUTO: 3.67 MILLION/UL (ref 3.88–5.62)
SODIUM SERPL-SCNC: 138 MMOL/L (ref 135–147)
WBC # BLD AUTO: 14.11 THOUSAND/UL (ref 4.31–10.16)

## 2025-04-07 PROCEDURE — 99024 POSTOP FOLLOW-UP VISIT: CPT | Performed by: SURGERY

## 2025-04-07 PROCEDURE — 85025 COMPLETE CBC W/AUTO DIFF WBC: CPT

## 2025-04-07 PROCEDURE — 83735 ASSAY OF MAGNESIUM: CPT

## 2025-04-07 PROCEDURE — 84145 PROCALCITONIN (PCT): CPT

## 2025-04-07 PROCEDURE — NC001 PR NO CHARGE: Performed by: STUDENT IN AN ORGANIZED HEALTH CARE EDUCATION/TRAINING PROGRAM

## 2025-04-07 PROCEDURE — 99232 SBSQ HOSP IP/OBS MODERATE 35: CPT | Performed by: STUDENT IN AN ORGANIZED HEALTH CARE EDUCATION/TRAINING PROGRAM

## 2025-04-07 PROCEDURE — 80048 BASIC METABOLIC PNL TOTAL CA: CPT

## 2025-04-07 PROCEDURE — 97163 PT EVAL HIGH COMPLEX 45 MIN: CPT

## 2025-04-07 PROCEDURE — 97116 GAIT TRAINING THERAPY: CPT

## 2025-04-07 RX ORDER — DEXTROSE MONOHYDRATE AND SODIUM CHLORIDE 5; .45 G/100ML; G/100ML
125 INJECTION, SOLUTION INTRAVENOUS CONTINUOUS
Status: DISCONTINUED | OUTPATIENT
Start: 2025-04-07 | End: 2025-04-09

## 2025-04-07 RX ORDER — POTASSIUM CHLORIDE 14.9 MG/ML
20 INJECTION INTRAVENOUS ONCE
Status: COMPLETED | OUTPATIENT
Start: 2025-04-07 | End: 2025-04-07

## 2025-04-07 RX ADMIN — HEPARIN SODIUM 5000 UNITS: 5000 INJECTION INTRAVENOUS; SUBCUTANEOUS at 21:19

## 2025-04-07 RX ADMIN — DEXTROSE AND SODIUM CHLORIDE 125 ML/HR: 5; .45 INJECTION, SOLUTION INTRAVENOUS at 12:05

## 2025-04-07 RX ADMIN — LABETALOL HYDROCHLORIDE 10 MG: 5 INJECTION, SOLUTION INTRAVENOUS at 08:41

## 2025-04-07 RX ADMIN — ACETAMINOPHEN 1000 MG: 10 INJECTION INTRAVENOUS at 12:05

## 2025-04-07 RX ADMIN — NICOTINE 1 PATCH: 21 PATCH, EXTENDED RELEASE TRANSDERMAL at 08:17

## 2025-04-07 RX ADMIN — ACETAMINOPHEN 1000 MG: 10 INJECTION INTRAVENOUS at 05:10

## 2025-04-07 RX ADMIN — METRONIDAZOLE 500 MG: 500 INJECTION, SOLUTION INTRAVENOUS at 09:58

## 2025-04-07 RX ADMIN — HYDRALAZINE HYDROCHLORIDE 10 MG: 20 INJECTION INTRAMUSCULAR; INTRAVENOUS at 10:41

## 2025-04-07 RX ADMIN — HEPARIN SODIUM 5000 UNITS: 5000 INJECTION INTRAVENOUS; SUBCUTANEOUS at 13:40

## 2025-04-07 RX ADMIN — METRONIDAZOLE 500 MG: 500 INJECTION, SOLUTION INTRAVENOUS at 21:20

## 2025-04-07 RX ADMIN — DEXTROSE AND SODIUM CHLORIDE 125 ML/HR: 5; .45 INJECTION, SOLUTION INTRAVENOUS at 23:22

## 2025-04-07 RX ADMIN — CEFTRIAXONE SODIUM 1000 MG: 10 INJECTION, POWDER, FOR SOLUTION INTRAVENOUS at 00:30

## 2025-04-07 RX ADMIN — HEPARIN SODIUM 5000 UNITS: 5000 INJECTION INTRAVENOUS; SUBCUTANEOUS at 05:10

## 2025-04-07 RX ADMIN — CHLORHEXIDINE GLUCONATE 15 ML: 1.2 SOLUTION ORAL at 08:17

## 2025-04-07 RX ADMIN — ACETAMINOPHEN 1000 MG: 10 INJECTION INTRAVENOUS at 17:47

## 2025-04-07 RX ADMIN — POTASSIUM CHLORIDE 20 MEQ: 14.9 INJECTION, SOLUTION INTRAVENOUS at 13:40

## 2025-04-07 NOTE — PLAN OF CARE
Problem: PHYSICAL THERAPY ADULT  Goal: Performs mobility at highest level of function for planned discharge setting.  See evaluation for individualized goals.  Description: Treatment/Interventions: LE strengthening/ROM, Functional transfer training, Elevations, Therapeutic exercise, Endurance training, Patient/family training, Equipment eval/education, Bed mobility, Gait training, Compensatory technique education          See flowsheet documentation for full assessment, interventions and recommendations.  Note:    Problem List: Decreased strength, Decreased endurance, Impaired balance, Decreased mobility, Decreased safety awareness, Pain, Impaired vision  Assessment: Pt presents with abdominal pain started 2 hours prior to ED presentation. Pt was unable to answer questions secondary to pain and respiratory distress. Dx: small bowel obstruction, essential HTN, CKD, lung nodule, and bronchitis. 4/5/25 exploratory laparotomy, small bowel resection, primary anastomosis. order placed for PT eval and tx, w/ activity order of ambulate patient. pt presents w/ comorbidities of left femur fx surgery, HTN, and prostate cancer and personal factors of living in 2 story house, stair(s) to enter home, visual impairments, and tobacco use. pt presents w/ pain, weakness, decreased endurance, impaired balance, gait deviations, decreased safety awareness, and fall risk. these impairments are evident in findings from physical examination (weakness), mobility assessment (need for min to mod assist w/ all phases of mobility when usually mobilizing independently, tolerance to only 3 feet of ambulation, and need for cueing for mobility technique), and Barthel Index: 45/100. pt needed input for task focus and mobility and breathing technique. pt is at risk for falls due to physical and safety awareness deficits. pt's clinical presentation is unstable/unpredictable (evident in poor blood pressure control, need for assist w/ all phases of  mobility when usually mobilizing independently, tolerance to only 3 feet of ambulation, pain and nausea impacting overall mobility status, need for supplemental oxygen in order to maintain oxygen saturation, and need for input for mobility technique/safety). pt needs inpatient PT tx to improve mobility deficits and progress mobility training as appropriate.        Rehab Resource Intensity Level, PT: II (Moderate Resource Intensity)    See flowsheet documentation for full assessment.

## 2025-04-07 NOTE — ASSESSMENT & PLAN NOTE
-with PMHx prostate cancer s/p resection and radiation (2011), HTN, cholecystectomy, current smoker (2 ppd) presenting with acute onset abdominal pain.  -CT scan concerning for ischemic small bowel, although other differentials note excluded.  -now S/p ex lap, SBR. Closed loop obstruction 2/2 adhesive band found intraoperatively with compromised segment of SB, all on 4/6  -NPO/NGT, 750cc, await bowel function  -IV fluids  -IV abx to complete 4 day total course, ceftriaxone/Flagyl  -Ok for downgrade from ICU  -Ambulation, out of bed

## 2025-04-07 NOTE — PROGRESS NOTES
Progress Note - Critical Care/ICU   Name: Nav Infante 75 y.o. male I MRN: [id number]  Unit/Bed#: ICU 06 I Date of Admission: 2025   Date of Service: 2025 I Hospital Day: 2       Critical Care Interval Transfer Note:    Brief Hospital Summary:    -75 year old male who presented on  with severe generalized abdominal pain which started 2 hours prior to presentation.  He was found to have CT with small bowel ischemia and was emergently taken to the OR for ex lap with 30 cm small bowel resection and anastomosis of the terminal ileum.  Afterwards, patient came to the ICU and was extubated.  Patient was placed on ceftriaxone and Flagyl.  -No major interval changes in ICU.  Patient remained hemodynamically stable and well-appearing.  He has a Dilaudid PCA but has only minimally utilized this and pain has been well-controlled.  - Patient remains with NG tube to suction.  - Patient remains without bowel movement or flatus since procedure.  - Patient stable for downgrade to/S. M/S.    Barriers to discharge:   Continued NGT suction, continued monitoring post-op, has not had BM or flatus yet  Severe post-operative pain requiring IV medication  I&O monitoring, hemodynamic monitoring    Consults: IP CONSULT TO CASE MANAGEMENT    Recommended to review admission imaging for incidental findings and document in discharge navigator: Incidental findincm lung nodule     Discharge Plan: Anticipate discharge in 48-72 hrs to home vs rehab       Patient seen and evaluated by Critical Care today and deemed to be appropriate for transfer to Med Surg. Spoke to Dr. Ingram from surgery to accept transfer. Critical care can be contacted via SecureChat with any questions or concerns. Please use the Critical Care AP Role in Secure Chat for any provider inquires until the patient is transferred out of the ICU or until tomorrow at 0600.

## 2025-04-07 NOTE — ASSESSMENT & PLAN NOTE
-with PMHx prostate cancer s/p resection and radiation (2011), HTN, cholecystectomy, current smoker (2 ppd) presenting with acute onset abdominal pain.  -CT scan concerning for ischemic small bowel, although other differentials note excluded.  -See above

## 2025-04-07 NOTE — PROGRESS NOTES
Progress Note - Surgery-General   Name: Nav Infante 75 y.o. male I MRN: [id number]  Unit/Bed#: ICU 06 I Date of Admission: 4/5/2025   Date of Service: 4/7/2025 I Hospital Day: 2    Assessment & Plan  SBO (small bowel obstruction) (HCC)  -with PMHx prostate cancer s/p resection and radiation (2011), HTN, cholecystectomy, current smoker (2 ppd) presenting with acute onset abdominal pain.  -CT scan concerning for ischemic small bowel, although other differentials note excluded.  -now S/p ex lap, SBR. Closed loop obstruction 2/2 adhesive band found intraoperatively with compromised segment of SB, all on 4/6  -NPO/NGT, 750cc, await bowel function  -IV fluids  -IV abx to complete 4 day total course, ceftriaxone/Flagyl  -Ok for downgrade from ICU  -Ambulation, out of bed  Acute abdominal pain  -with PMHx prostate cancer s/p resection and radiation (2011), HTN, cholecystectomy, current smoker (2 ppd) presenting with acute onset abdominal pain.  -CT scan concerning for ischemic small bowel, although other differentials note excluded.  -See above  Essential hypertension    Stage 3b chronic kidney disease (CKD) (HCC)  Lab Results   Component Value Date    EGFR 64 04/06/2025    EGFR 64 04/06/2025    EGFR 54 04/05/2025    CREATININE 1.11 04/06/2025    CREATININE 1.10 04/06/2025    CREATININE 1.27 04/05/2025           Subjective   Doing well, denies significant abdominal pain, remains n.p.o. with NG tube in place, no flatus or bowel movement yet, no nausea or emesis, voiding with Pascual removed.    Objective :  Temp:  [96.3 °F (35.7 °C)-97.8 °F (36.6 °C)] 97.8 °F (36.6 °C)  HR:  [51-82] 82  BP: (140-169)/(70-85) 156/79  Resp:  [9-36] 26  SpO2:  [93 %-100 %] 93 %    I/O         04/05 0701  04/06 0700 04/06 0701  04/07 0700    P.O.  0    I.V. (mL/kg) 3000 (42.3) 1075 (15.2)    NG/GT  0    IV Piggyback 450 450    Total Intake(mL/kg) 3450 (48.7) 1525 (21.5)    Urine (mL/kg/hr) 800 710 (0.4)    Emesis/NG output 350 750    Total  Output 1150 1460    Net +2300 +65                Lines/Drains/Airways       Active Status       Name Placement date Placement time Site Days    NG/OG/Enteral Tube Nasogastric 18 Fr Right nare 04/05/25  2345  Right nare  1                  Physical Exam    General: NAD  Skin: Warm, dry, anicteric  HEENT: Normocephalic, atraumatic  CV: RRR, no m/r/g  Pulm: CTA b/l, no inc WOB  Abd: Soft, ND, mildly tender, Mepilex over incision  MSK: Symmetric, no edema, no tenderness, no deformity  Neuro: AOx3, GCS 15       Lab Results: I have reviewed the following results:  Recent Labs     04/05/25 1935 04/05/25  1935 04/05/25  2112 04/06/25  0050 04/06/25  0344 04/06/25  0348   WBC 12.85*  --   --  17.74*  --  17.53*   HGB 12.3  --   --  11.9*  --  11.7*   HCT 38.7  --   --  37.7  --  37.1     --   --  212  --  215   SODIUM 139  --   --  137  --  138   K 4.0  --   --  4.4  --  4.3     --   --  106  --  104   CO2 26  --   --  26  --  26   BUN 26*  --   --  22  --  23   CREATININE 1.27  --   --  1.10  --  1.11   GLUC 169*  --   --  135  --  130   CAIONIZED  --   --   --  1.10*  --   --    MG  --    < >  --  1.5*  --  1.6*   PHOS  --   --   --  3.0  --   --    AST 11*  --   --  10*  --   --    ALT 6*  --   --  5*  --   --    ALB 3.9  --   --  3.3*  --   --    TBILI 0.43  --   --  0.27  --   --    ALKPHOS 86  --   --  67  --   --    PTT 27  --   --   --   --   --    INR 1.02  --   --   --   --   --    HSTNI0 6  --   --   --   --   --    HSTNI2  --   --  4  --   --   --    LACTICACID 2.3*  --  6.6* 2.4* 1.6  --     < > = values in this interval not displayed.             VTE Pharmacologic Prophylaxis: VTE covered by:  heparin (porcine), Subcutaneous, 5,000 Units at 04/06/25 6050     VTE Mechanical Prophylaxis: sequential compression device

## 2025-04-07 NOTE — PHYSICAL THERAPY NOTE
PHYSICAL THERAPY EVALUATION NOTE    Patient Name: Nav Infante  Today's Date: 4/7/2025  AGE:   75 y.o.  Mrn:   [id number]  ADMIT DX:  Acute abdominal pain [R10.9]  Abdominal pain [R10.9]  Lung nodule [R91.1]  Intestinal obstruction, unspecified cause, unspecified whether partial or complete (HCC) [K56.609]    Past Medical History:   Diagnosis Date    Cancer (HCC)     PROSTATE    Essential hypertension 2/4/2018    Femur fracture, right (HCC)     Gastritis 10/11/2016    Tobacco abuse 10/11/2016     Length Of Stay: 2  PHYSICAL THERAPY EVALUATION :    04/07/25 1607   PT Last Visit   PT Visit Date 04/07/25   Note Type   Note type Evaluation   Pain Assessment   Pain Assessment Tool 0-10   Pain Score 5   Pain Location/Orientation Orientation: Right;Location: [facility]   Hospital Pain Intervention(s) Repositioned;Ambulation/increased activity;Other (Comment)  (pt utilized PCA pump)   Restrictions/Precautions   Other Precautions Chair Alarm;Bed Alarm;Multiple lines;Telemetry;O2;Fall Risk;Pain;Visual impairment  (NPO, NG tube, left eye visual impairment)   Home Living   Type of Home House   Home Layout Two level;1/2 bath on main level;Bed/bath upstairs;Other (Comment)  (1 JENARO)   Additional Comments lives w/ spouse. ambulates w/o device. owns walker, cane and shower chair. independent w/ ADLs. no falls in last 6 months. no history of supplemental oxygen use.   General   Additional Pertinent History 4/7/25 at 10:30 blood pressure was 180/93   Family/Caregiver Present No   Cognition   Arousal/Participation Alert   Orientation Level Oriented to person;Other (Comment)  (pt was identified w/ full name, birth date)   Following Commands Follows one step commands with increased time or repetition   Comments 1L oxygen via nasal cannula. resting pulse ox 96% and 84 BPM, active 92% and 114 BPM.   Subjective   Subjective pt seen supine in bed. agreed to PT  eval. reports having R UE pain (related to Potassium IV drip).   RLE Assessment   RLE Assessment WFL  (3+ to 4-/5)   LLE Assessment   LLE Assessment WFL  (3+ to 4-/5)   Vision-Basic Assessment   Current Vision Other (Comment)  (Wears glasses for watching TV)   Light Touch   RLE Light Touch Grossly intact   LLE Light Touch Grossly intact   Transfers   Sit to Stand 3  Moderate assistance   Additional items Assist x 1;Increased time required;Verbal cues  (for hand placement)   Stand to Sit 4  Minimal assistance   Additional items Assist x 1;Increased time required   Ambulation/Elevation   Gait pattern Decreased foot clearance;Foward flexed;Short stride;Excessively slow   Gait Assistance 3  Moderate assist  (w/ chair follow)   Additional items Assist x 1;Verbal cues  (for walker positioning, full step length)   Assistive Device Rolling walker   Distance 3 feet  (additional ambulation not possible due to fatigue, pain, nausea)   Stair Management Assistance Not tested  (due to limited ambulation tolerance, safety concern)   Ambulation/Elevation Additional Comments pt had nausea and vomited after ambulation. Yassine NSG assessed pt at bedside. pt reported feeling nauseated after using PCA pump.   Balance   Static Sitting Fair +   Static Standing Poor +  (w/ roller walker)   Ambulatory Poor  (w/ roller walker)   Activity Tolerance   Activity Tolerance Patient limited by fatigue;Patient limited by pain   Nurse Made Aware spoke to Yvon ROA   Assessment   Problem List Decreased strength;Decreased endurance;Impaired balance;Decreased mobility;Decreased safety awareness;Pain;Impaired vision   Assessment Pt presents with abdominal pain started 2 hours prior to ED presentation. Pt was unable to answer questions secondary to pain and respiratory distress. Dx: small bowel obstruction, essential HTN, CKD, lung nodule, and bronchitis. 4/5/25 exploratory laparotomy, small bowel resection, primary anastomosis. order  placed for PT eval and tx, w/ activity order of ambulate patient. pt presents w/ comorbidities of left femur fx surgery, HTN, and prostate cancer and personal factors of living in 2 story house, stair(s) to enter home, visual impairments, and tobacco use. pt presents w/ pain, weakness, decreased endurance, impaired balance, gait deviations, decreased safety awareness, and fall risk. these impairments are evident in findings from physical examination (weakness), mobility assessment (need for min to mod assist w/ all phases of mobility when usually mobilizing independently, tolerance to only 3 feet of ambulation, and need for cueing for mobility technique), and Barthel Index: 45/100. pt needed input for task focus and mobility and breathing technique. pt is at risk for falls due to physical and safety awareness deficits. pt's clinical presentation is unstable/unpredictable (evident in poor blood pressure control, need for assist w/ all phases of mobility when usually mobilizing independently, tolerance to only 3 feet of ambulation, pain and nausea impacting overall mobility status, need for supplemental oxygen in order to maintain oxygen saturation, and need for input for mobility technique/safety). pt needs inpatient PT tx to improve mobility deficits and progress mobility training as appropriate.   Goals   Patient Goals I want to try to make a living   STG Expiration Date 04/21/25   Short Term Goal #1 pt will: Increase bilateral LE strength 1/2 grade to facilitate independent mobility, Perform bed mobility modified independent to increase level of independence, Perform all transfers modified independent to improve independence, Ambulate 300 ft. 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, Navigate 3 stair(s) w/ supervision with unilateral handrail to facilitate return to previous living environment,  Increase all balance 1 grade to decrease risk for falls, Tolerate 3 hr OOB to faciliate upright tolerance, Tolerate standing 3 minutes modified independent to facilitate functional task performance, Improve Barthel Index score to 65 or greater to facilitate independence, and Complete Timed Up and Go or Comfortable Gait Speed to further assess mobility and monitor progress   PT Treatment Day 1   Plan   Treatment/Interventions LE strengthening/ROM;Functional transfer training;Elevations;Therapeutic exercise;Endurance training;Patient/family training;Equipment eval/education;Bed mobility;Gait training;Compensatory technique education   PT Frequency 3-5x/wk   Discharge Recommendation   Rehab Resource Intensity Level, PT II (Moderate Resource Intensity)   AM-PAC Basic Mobility Inpatient   Turning in Flat Bed Without Bedrails 3   Lying on Back to Sitting on Edge of Flat Bed Without Bedrails 2   Moving Bed to Chair 2   Standing Up From Chair Using Arms 3   Walk in Room 2   Climb 3-5 Stairs With Railing 1   Basic Mobility Inpatient Raw Score 13   Basic Mobility Standardized Score 33.99   [facility] Highest Level Of Mobility   [facility] Goal 4: Move to chair/commode   [facility] Achieved 5: Stand (1 or more minutes)   Barthel Index   Feeding 0   Bathing 0   Grooming Score 5   Dressing Score 5   Bladder Score 10   Bowels Score 10   Toilet Use Score 5   Transfers (Bed/Chair) Score 10   Mobility (Level Surface) Score 0   Stairs Score 0   Barthel Index Score 45   Additional Treatment Session   Start Time 1607   End Time 1617   Treatment Assessment Pt agreed to participate in PT intervention. Pt completed additional mobilization to address physical and mobility deficits noted during eval. Therapist provided education to pt including walker management and transfer technique. Sit < - > stand transfer w/ minx1. Ambulated 5 feet w/ roller walker w/ modx1 w/ chair follow. Additional ambulation was not possible due to fatigue and pain. Pt  shows improvement w/ increased activity tolerance. Further inpatient PT intervention is necessary to maximize functional independence.   Equipment Use roller walker   Additional Treatment Day 1   End of Consult   Patient Position at End of Consult Bedside chair;Bed/Chair alarm activated;All needs within reach     The patient's AM-PAC Basic Mobility Inpatient Short Form Raw Score is 13. A Raw score of less than or equal to 16 suggests the patient may benefit from discharge to post-acute rehabilitation services. Please also refer to the recommendation of the Physical Therapist for safe discharge planning.    Skilled PT recommended while in hospital and upon DC to progress pt toward treatment goals.     Joe Solomon, PT

## 2025-04-07 NOTE — ASSESSMENT & PLAN NOTE
CTA chest 4/5:  Bilateral lower lobe bronchial wall thickening, worse on the left where there are multifocal proximal occlusions and inferior left hilar adenopathy.  Corresponding groundglass opacities within the basilar left lower lobe.  Findings consistent with bronchitis and an infectious infiltrate.    Plan:  Trend WBC and fever curve  On Rocephin post op, consider azithromycin  F/u procal

## 2025-04-07 NOTE — PROGRESS NOTES
Progress Note - Critical Care/ICU   Name: Nav Infante 75 y.o. male I MRN: [id number]  Unit/Bed#: ICU 06 I Date of Admission: 4/5/2025   Date of Service: 4/7/2025 I Hospital Day: 2      Assessment & Plan  Acute abdominal pain  Pt presented with severe conversation limiting abdominal pain that started abruptly 2 hours prior to presentation  Imaging concerning for small bowel ischemia   Now s/p exploratory laparotomy with 30cm resention and anastomosis of terminal ileum  NGT in place, continue to low continuous suction  Maintain NPO   Continue maintenance fluids with isolyte at 125mL/hr  Pain control, PCA in place  Essential hypertension  Holding PTA Lisinopril given concern for possible hemodynamic compromise with need for pressor support during surgery, restart as able  PRN labetalol or hydralazine for SBP >160   Stage 3b chronic kidney disease (CKD) ([facility])  Lab Results   Component Value Date    EGFR 64 04/06/2025    EGFR 64 04/06/2025    EGFR 54 04/05/2025    CREATININE 1.11 04/06/2025    CREATININE 1.10 04/06/2025    CREATININE 1.27 04/05/2025     Baseline Cr appears to be around 1.1, at baseline   Trend labs and manage electrolytes PRN  SBO (small bowel obstruction) ([facility])  Presented to the ED on 4/5 for acute periumbilical abd pain   CT abd 4/5:   Dilated small bowel loops in the lower abdomen/pelvis with thickened and hypoenhancing bowel wall and small amount of adjacent free fluid. Differential considerations include small bowel ischemia   Concern for SBO, pt was taken emergently to OR 4/5    Plan:   NPO/NGT, monitor output  IV fluids  Continue Ceftriaxone and Flagyl post op, day 2/4  Multimodal pain control  Encourage OOB to chair, ambulation, IS  Lung nodule  4 mm RLL nodule seen on CT chest    Plan:  F/u with outpt PCP for repeat chest imaging in 12 months.    Bronchitis  CTA chest 4/5:  Bilateral lower lobe bronchial wall thickening, worse on the left where there are multifocal proximal occlusions and  inferior left hilar adenopathy.  Corresponding groundglass opacities within the basilar left lower lobe.  Findings consistent with bronchitis and an infectious infiltrate.    Plan:  Trend WBC and fever curve  On Rocephin post op, consider azithromycin  F/u procal    Tobacco use  Smokes 2 PPD at baseline    Plan:  Nicotine patch  Encourage cessation  Disposition: Stepdown Level 2    ICU Core Measures     A: Assess, Prevent, and Manage Pain Has pain been assessed? Yes  Need for changes to pain regimen? No   B: Both SAT/SAT  N/A   C: Choice of Sedation RASS Goal: 0 Alert and Calm  Need for changes to sedation or analgesia regimen? No   D: Delirium CAM-ICU: Negative   E: Early Mobility  Plan for early mobility? Yes   F: Family Engagement Plan for family engagement today? Yes       Antibiotic Review: Post op requirements     Review of Invasive Devices:            Prophylaxis:  VTE VTE covered by:  heparin (porcine), Subcutaneous, 5,000 Units at 04/06/25 2150       Stress Ulcer  not ordered         24 Hour Events : no acute events overnight. Pain well controlled, also has PCA pump. Denies nausea, vomiting. Has not passed flatus or BM today.   Subjective   Review of Systems: See HPI for Review of Systems    Objective :                   Vitals I/O      Most Recent Min/Max in 24hrs   Temp 97.6 °F (36.4 °C) Temp  Min: 96.3 °F (35.7 °C)  Max: 97.6 °F (36.4 °C)   Pulse 55 Pulse  Min: 22  Max: 67   Resp (!) 36 Resp  Min: 9  Max: 36   /75 BP  Min: 140/74  Max: 169/77   O2 Sat 100 % SpO2  Min: 99 %  Max: 100 %      Intake/Output Summary (Last 24 hours) at 4/7/2025 0231  Last data filed at 4/6/2025 2318  Gross per 24 hour   Intake 1525 ml   Output 2060 ml   Net -535 ml       Diet NPO    Invasive Monitoring           Physical Exam   Physical Exam  Vitals and nursing note reviewed.   Eyes:      General: Neglect     Extraocular Movements: Extraocular movements intact.      Conjunctiva/sclera: Conjunctivae normal.      Pupils:  Pupils are equal, round, and reactive to light.   Skin:     General: Skin is warm and dry.      Capillary Refill: Capillary refill takes less than 2 seconds.   HENT:      Head: Normocephalic and atraumatic.      Right Ear: Hearing normal.      Left Ear: Hearing normal.      Nose: No congestion or rhinorrhea.      Mouth/Throat:      Mouth: Mucous membranes are dry.   Cardiovascular:      Rate and Rhythm: Normal rate and regular rhythm.      Pulses: Normal pulses.   Musculoskeletal:         General: No swelling. Normal range of motion.      Right lower leg: No edema.      Left lower leg: No edema.   Abdominal: General: There is no distension.      Palpations: Abdomen is soft.      Tenderness: There is no abdominal tenderness. There is no guarding.      Comments: Midline surgical incision, dressing clean, dry, and intact.   Constitutional:       General: He is not in acute distress.     Appearance: He is well-developed and well-nourished. He is not ill-appearing.   Pulmonary:      Effort: Pulmonary effort is normal.      Breath sounds: Wheezing (intermittent) present.   Psychiatric:         Speech: Speech is not no receptive aphasia or no expressive aphasia.   Neurological:      General: No focal deficit present.      Mental Status: He is oriented to person, place and time. Mental status is at baseline.      Cranial Nerves: No dysarthria or facial asymmetry.      Sensory: No sensory deficit.      Motor: Strength full and intact in all extremities.          Diagnostic Studies        Lab Results: I have reviewed the following results:     Medications:  Scheduled PRN   acetaminophen, 1,000 mg, Q6H PATRICE  cefTRIAXone, 1,000 mg, Q24H  chlorhexidine, 15 mL, Q12H PATRICE  heparin (porcine), 5,000 Units, Q8H PATRICE  metroNIDAZOLE, 500 mg, Q12H  nicotine, 1 patch, Daily      fentaNYL, 25 mcg, Q5 Min PRN  hydrALAZINE, 10 mg, Q4H PRN  HYDROmorphone, 0.2 mg, Q3H PRN  HYDROmorphone, 0.5 mg, Q10 Min PRN  labetalol, 10 mg, Q4H  PRN  ondansetron, 4 mg, Once PRN  ondansetron, 4 mg, Q6H PRN       Continuous    HYDROmorphone,   multi-electrolyte, 125 mL/hr, Last Rate: 125 mL/hr (04/06/25 2207)         Labs:   CBC    Recent Labs     04/06/25 0050 04/06/25 0348   WBC 17.74* 17.53*   HGB 11.9* 11.7*   HCT 37.7 37.1    215     BMP    Recent Labs     04/06/25 0050 04/06/25 0348   SODIUM 137 138   K 4.4 4.3    104   CO2 26 26   AGAP 5 8   BUN 22 23   CREATININE 1.10 1.11   CALCIUM 8.0* 8.2*       Coags    Recent Labs     04/05/25 1935   INR 1.02   PTT 27        Additional Electrolytes  Recent Labs     04/06/25 0050 04/06/25 0348   MG 1.5* 1.6*   PHOS 3.0  --    CAIONIZED 1.10*  --           Blood Gas    No recent results  Recent Labs     04/06/25 0055   PHVEN 7.213*   [id number] 67.1*   PO2VEN 38.1   [id number] 26.4   BEVEN -2.6   [id number] 66.4    LFTs  Recent Labs     04/05/25 1935 04/06/25 0050   ALT 6* 5*   AST 11* 10*   ALKPHOS 86 67   ALB 3.9 3.3*   TBILI 0.43 0.27       Infectious  No recent results  Glucose  Recent Labs     04/05/25 1935 04/06/25 0050 04/06/25 0348   GLUC 169* 135 130

## 2025-04-07 NOTE — ASSESSMENT & PLAN NOTE
Pt presented with severe conversation limiting abdominal pain that started abruptly 2 hours prior to presentation  Imaging concerning for small bowel ischemia   Now s/p exploratory laparotomy with 30cm resention and anastomosis of terminal ileum  NGT in place, continue to low continuous suction  Maintain NPO   Continue maintenance fluids with isolyte at 125mL/hr  Pain control, PCA in place

## 2025-04-07 NOTE — CASE MANAGEMENT
Case Management Assessment & Discharge Planning Note    Patient name Nav Infante  Location ICU 06/ICU 06 MRN [id number]  : 1950 Date 2025       Current Admission Date: 2025  Current Admission Diagnosis:SBO (small bowel obstruction) (HCC)   Patient Active Problem List    Diagnosis Date Noted Date Diagnosed    Lung nodule 2025     Bronchitis 2025     Tobacco use 2025     SBO (small bowel obstruction) (HCC) 2025     Acute abdominal pain 2025     Stage 3b chronic kidney disease (CKD) ([facility]) 2023     Intertrochanteric fracture of left femur ([facility]) 2018     Essential hypertension 2018     Gastritis 10/11/2016     Tobacco abuse 10/11/2016     Prostate cancer ([facility]) 2011       LOS (days): 2  Geometric Mean LOS (GMLOS) (days):   Days to GMLOS:     OBJECTIVE:    Risk of Unplanned Readmission Score: 14.69         Current admission status: Inpatient       Preferred Pharmacy:   [id number] - [place] [address] [address]  Phone: [phone] Fax: [phone]    [facility] - [place], [place] - [address]  [address]  [facility] [address]  Phone: [phone] Fax: [phone]    [facility]/pharmacy #[id number] - [place] [address] [address]  Phone: [phone] Fax: [phone]    [id number] - [place], [place] - [address]  [address]  [place] [address]  Phone: [phone] Fax: [phone]    Primary Care Provider: Murphy Marks MD    Primary Insurance: [facility]  Secondary Insurance:     ASSESSMENT:  Active Health Care Proxies    There are no active Health Care Proxies on file.       Patient Information  Admitted from:: Home  Mental Status: Alert  During Assessment patient was accompanied by: Spouse, Daughter (Wife - Alexa and Daughter - Lili)  Assessment information provided by:: Patient  Primary Caregiver:  Self  Support Systems: Self, Spouse/significant other, Daughter, Family members  County of Residence: [place]  What city do you live in?: [place]  Home entry access options. Select all that apply.: Stairs  Number of steps to enter home.: 1  Type of Current Residence: 2 story home  Upon entering residence, is there a bedroom on the main floor (no further steps)?: No  A bedroom is located on the following floor levels of residence (select all that apply):: 2nd Floor  Upon entering residence, is there a bathroom on the main floor (no further steps)?: Yes (1/2 bathroom on first and full on second)  Number of steps to 2nd floor from main floor: One Flight  Living Arrangements: Lives w/ Spouse/significant other    Activities of Daily Living Prior to Admission  Functional Status: Independent  Completes ADLs independently?: Yes  Ambulates independently?: Yes  Does patient use assisted devices?: No  Does patient currently own DME?: Yes  What DME does the patient currently own?: Walker, Straight Cane, Shower Chair  Does patient have a history of Outpatient Therapy (PT/OT)?: No  Does the patient have a history of Short-Term Rehab?: Yes  Does patient have a history of HHC?: No  Does patient currently have HHC?: No    Patient Information Continued  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)  Does patient have prescription coverage?: Yes  Can the patient afford their medications and any related supplies (such as glucometers or test strips)?: Yes  Does patient receive dialysis treatments?: No  Does patient have a history of substance abuse?: No  Does patient have a history of Mental Health Diagnosis?: No    Means of Transportation  Means of Transport to Appts:: Drives Self    DISCHARGE DETAILS:    Discharge planning discussed with:: Pt and his wifeAlexa    Contacts  Patient Contacts: wifeAlexa  Relationship to Patient:: Family  Contact Method: In Person  Reason/Outcome: Continuity of Care,  Emergency Contact, Discharge Planning, Referral    Other Referral/Resources/Interventions Provided:  Interventions: Other (Specify)  Referral Comments: CM spoke with pt and his wife, Alexa. Daughter - Lili also at bedside. CM introduced self/role with dcp. Aware CM will f/u with recommendations prior to dc. PT/OT pending.

## 2025-04-07 NOTE — ASSESSMENT & PLAN NOTE
Presented to the ED on 4/5 for acute periumbilical abd pain   CT abd 4/5:   Dilated small bowel loops in the lower abdomen/pelvis with thickened and hypoenhancing bowel wall and small amount of adjacent free fluid. Differential considerations include small bowel ischemia   Concern for SBO, pt was taken emergently to OR 4/5    Plan:   NPO/NGT, monitor output  IV fluids  Continue Ceftriaxone and Flagyl post op, day 2/4  Multimodal pain control  Encourage OOB to chair, ambulation, IS

## 2025-04-07 NOTE — ASSESSMENT & PLAN NOTE
4 mm RLL nodule seen on CT chest    Plan:  F/u with outpt PCP for repeat chest imaging in 12 months.

## 2025-04-07 NOTE — UTILIZATION REVIEW
Initial Clinical Review    Admission: Date/Time/Statement:   Admission Orders (From admission, onward)       Ordered        04/05/25 2218  Inpatient Admission  Once                          Orders Placed This Encounter   Procedures    Inpatient Admission     Standing Status:   Standing     Number of Occurrences:   1     Level of Care:   Level 1 Stepdown [13]     Estimated length of stay:   More than 2 Midnights     Certification:   I certify that inpatient services are medically necessary for this patient for a duration of greater than two midnights. See H&P and MD Progress Notes for additional information about the patient's course of treatment.     ED Arrival Information       Expected   -    Arrival   4/5/2025 19:13    Acuity   Urgent              Means of arrival   Ambulance    Escorted by   [facility]    Service   Trauma    Admission type   Emergency              Arrival complaint   EMS- Abdominal pain             Chief Complaint   Patient presents with    Abdominal Pain     Patient arrived via EMS from home for eval of constant abd pain that started 2 hrs ago. Given 100 of fentanyl thus far by EMS. +Left leg swelling. Pt co N/Vomiting. Denies urinary sx, fevers. Has had 100ml of NS thus far. Hx HTN.        Initial Presentation: 75 y.o. male  to ED via EMS from home.    Admitted to inpatient with Dx: Small bowel ischemia with pain out of proportion to exam, differential includes: internal hernia, torsion, embolic event, other intra-abdominal process/Lactic acidosis/Hypertensive urgency secondary to pain.   Presented to ED with acute onset of periumbilical pain starting about 2 hours prior to arrival.  Ems gave Fentanyl.   PMHx:  prostate cancer s/p resection and radiation (2011), HTN, cholecystectomy, current smoker (2 ppd)   On exam:  moaning in pain.  Acute distress, appears ill.  Hypertensive and tachypnea.  Abdominal tenderness in periumbilical area, guarding and rebound.  Wbc 12.85.  lactic acid 2.3>6.6.   bun 26.  Creatinine 1.27.  glucose 169.  CTA abdomen with evidence of small bowel obstruction with possible bowel ischemia  ED treatment:  IV analgesia x3.  IVF bolus.    Plan includes to admit to ICU.  IVF.  Trend Lactate.   OR for laparotomy.     Procedure 4/5/25: LAPAROTOMY EXPLORATORY  LAPAROTOMY EXPLORATORY W/ SMALL BOWEL RESECTION WITH ANASTOMOSIS  Findings Closed-loop small bowel obstruction causing small bowel ischemia in the pelvis from single adhesive band, approximately 30 cm of ileum nonviable and resected, anastomosis approximately 10cm from the ileocecal valve     Date: 4/6/25   Day 2: POD # 1 ex lap, SBR.  Pain controlled.  ABD: Soft, appropriately tender, nondistended. Incision CDI w/o strikethrough.  To continue NPO, NGT, IVF.  IV antibiotics.  Dc nicole.   DV ppx.  Encourage OOB, IS.     4/6/25 per Critical Care - acute abdominal pain.  History of hpt, stage 3 CKD with baseline of 1.1. Now s/p exploratory laparotomy with 30cm resention and anastomosis of terminal ileum.   admit to Stepdown level one.  NGT in place, continue to low continuous suction.  NPO.  Continue maintenance fluids with isolyte at 125mL/hr.  Hold home antihypertensives.   As needed labetalol, or hydralazine for SBP >160. Trend labs and manage electrolytes PRN       Patient has crossed 3 midnights and requires ongoing care    4/7/2025 .  Patient presents with  no flatus or BM.    On exam:  Abd: Soft, ND, mildly tender, Mepilex over incision. Lungs intermittent wheezing.    Abnormal labs or imaging:   procal 1.04.  wbc 14.11.   Diagnosis/Plan   POD #2 ex lap, SBR. Closed loop obstruction 2/2 adhesive band found intraoperatively with compromised segment of SB.  Continue NPO.  Ngt.   IV antibiotics. Pain control:dPCA for pain control, scheduled Ofirmev TID.  Wean O2 to 90% with smoking history, pulm toilet, nicotine patch, xopenex TID.   Monitor electrolytes will transition to D51/2NS +20K at 125mL     ED Treatment-Medication  Administration from 04/05/2025 1913 to 04/05/2025 2208         Date/Time Order Dose Route Action     04/05/2025 1954 fentanyl citrate (PF) (FOR EMS ONLY) 100 mcg/2 mL injection 100 mcg 0 mcg Does not apply Given to EMS     04/05/2025 2001 fentaNYL injection 100 mcg 100 mcg Intravenous Given     04/05/2025 2018 lactated ringers bolus 1,000 mL 1,000 mL Intravenous New Bag     04/05/2025 2023 morphine injection 4 mg 4 mg Intravenous Given     04/05/2025 2020 ondansetron (ZOFRAN) injection 4 mg 4 mg Intravenous Given     04/05/2025 2104 fentaNYL injection 100 mcg 100 mcg Intravenous Given            Scheduled Medications:  acetaminophen, 1,000 mg, Intravenous, Q6H PATRICE  cefTRIAXone, 1,000 mg, Intravenous, Q24H  chlorhexidine, 15 mL, Mouth/Throat, Q12H PATRICE  heparin (porcine), 5,000 Units, Subcutaneous, Q8H PATRICE  metroNIDAZOLE, 500 mg, Intravenous, Q12H  nicotine, 1 patch, Transdermal, Daily    magnesium sulfate 2 g/50 mL IVPB (premix) 2 g  Dose: 2 g  Freq: Once Route: IV  Last Dose: Stopped (04/06/25 1306)  Start: 04/06/25 0830 End: 04/06/25 1306    Continuous IV Infusions:  HYDROmorphone, , Intravenous, Continuous  multi-electrolyte, 125 mL/hr, Intravenous, Continuous    PRN Meds:  fentaNYL, 25 mcg, Intravenous, Q5 Min PRN  hydrALAZINE, 10 mg, Intravenous, Q4H PRN  HYDROmorphone, 0.2 mg, Intravenous, Q3H PRN x 1 4/6  HYDROmorphone, 0.5 mg, Intravenous, Q10 Min PRN  labetalol, 10 mg, Intravenous, Q4H PRN x 1 4/7  ondansetron, 4 mg, Intravenous, Once PRN  ondansetron, 4 mg, Intravenous, Q6H PRN    bupivacaine (PF) (MARCAINE) 0.25 % injection  Freq: As needed - x 1 4/6  Start: 04/06/25 0016 End: 04/06/25 0034    ED Triage Vitals   Temperature Pulse Respirations Blood Pressure SpO2 Pain Score   04/05/25 1918 04/05/25 1918 04/05/25 1918 04/05/25 1918 04/05/25 1918 04/05/25 2001   97.6 °F (36.4 °C) 59 (!) 26 (!) 206/92 90 % 5     Weight (last 2 days)       Date/Time Weight    04/06/25 0111 70.9 (156.31)    04/05/25 1918 69  (152.12)          Vital Signs (last 3 days)       Date/Time Temp Pulse Resp BP MAP (mmHg) SpO2 Calculated FIO2 (%) - Nasal Cannula Nasal Cannula O2 Flow Rate (L/min) O2 Device Patient Position - Orthostatic VS [place] Coma Scale Score Pain    04/07/25 0930 -- -- -- 152/79 109 -- -- -- -- -- -- --    04/07/25 0915 -- -- -- 153/81 111 -- -- -- -- -- -- --    04/07/25 0900 -- 57 22 149/75 105 96 % -- -- -- -- -- --    04/07/25 0816 -- -- -- 159/77 111 -- -- -- -- -- -- --    04/07/25 0800 -- -- -- 178/86 123 -- -- -- -- -- 15 No Pain    04/07/25 0700 97.7 °F (36.5 °C) 70 22 172/92 125 99 % -- -- Nasal cannula Lying -- --    04/07/25 0625 -- 79 27 -- -- 92 % -- -- -- -- -- --    04/07/25 0615 -- -- -- 163/84 116 -- -- -- -- -- -- --    04/07/25 0400 -- 66 17 161/83 115 92 % -- -- -- -- -- --    04/07/25 0300 97.8 °F (36.6 °C) 82 26 156/79 112 93 % -- -- -- -- -- --    04/07/25 0000 -- -- -- -- -- -- -- -- -- -- 15 No Pain    04/06/25 2300 97.6 °F (36.4 °C) 55 36 162/75 108 100 % -- -- -- -- -- --    04/06/25 2215 -- 58 34 161/74 109 100 % -- -- -- -- -- --    04/06/25 2000 -- 52 20 150/70 105 100 % -- -- -- -- -- --    04/06/25 1900 97.4 °F (36.3 °C) 62 21 142/76 100 100 % -- -- -- -- 15 --    04/06/25 1800 -- 58 33 169/77 110 100 % -- -- -- -- -- --    04/06/25 1700 -- 58 25 160/81 112 100 % -- -- -- -- -- --    04/06/25 1600 -- 62 26 165/85 118 99 % -- -- -- -- -- --    04/06/25 1500 96.3 °F (35.7 °C) 52 36 156/70 101 100 % -- -- -- Lying -- --    04/06/25 1400 -- 55 28 153/76 107 100 % -- -- -- -- -- --    04/06/25 1340 -- 60 24 153/72 106 100 % -- -- -- -- -- --    04/06/25 1300 -- 62 31 164/77 110 100 % -- -- -- -- -- --    04/06/25 1200 -- 56 28 147/70 101 100 % -- -- -- -- 15 No Pain    04/06/25 1100 96.6 °F (35.9 °C) 57 14 140/74 102 100 % -- -- -- Lying -- --    04/06/25 1000 -- 54 12 157/74 107 100 % -- -- -- -- -- --    04/06/25 0900 -- 56 12 151/79 110 100 % -- -- -- -- -- --    04/06/25 0800 -- 51 9 151/71  103 100 % -- -- -- -- 15 No Pain    04/06/25 0700 -- 67 19 150/75 107 100 % -- -- -- Lying -- --    04/06/25 0655 97 °F (36.1 °C) 54 18 155/75 107 100 % -- -- -- -- -- --    04/06/25 0500 -- 56 15 164/84 118 100 % -- -- -- -- -- --    04/06/25 0400 -- -- -- -- -- -- -- -- -- -- 15 --    04/06/25 0300 -- 51 14 157/79 111 100 % -- -- -- -- -- --    04/06/25 0249 -- 22 -- -- -- -- -- -- -- -- -- --    04/06/25 0230 -- 22 13 163/77 110 100 % -- -- -- -- -- --    04/06/25 0200 -- 20 -- -- -- -- -- -- -- -- 15 No Pain    04/06/25 0138 -- 50 10 160/85 115 100 % -- -- -- -- -- --    04/06/25 0111 97.2 °F (36.2 °C) 60 14 170/88 122 92 % 28 2 L/min Nasal cannula -- -- 6    04/06/25 0100 -- 68 18 172/84 122 92 % -- -- -- -- -- --    04/05/25 2130 -- 67 26 192/79 113 99 % 28 2 L/min Nasal cannula Sitting -- --    04/05/25 2115 -- 60 26 218/96 138 97 % 28 2 L/min Nasal cannula Sitting -- --    04/05/25 2104 -- -- -- -- -- -- -- -- -- -- -- 10 - Worst Possible Pain    04/05/25 2100 -- 69 26 196/84 120 98 % 28 2 L/min Nasal cannula Sitting -- --    04/05/25 2030 -- 65 26 193/84 121 98 % 28 2 L/min Nasal cannula Sitting -- --    04/05/25 2023 -- -- -- -- -- -- -- -- -- -- -- 10 - Worst Possible Pain    04/05/25 2015 -- -- -- -- -- -- -- -- -- -- 15 --    04/05/25 2001 -- -- -- -- -- -- -- -- -- -- -- 5    04/05/25 1922 -- 58 26 192/86 123 93 % -- -- None (Room air) Sitting -- --    04/05/25 1918 97.6 °F (36.4 °C) 59 26 206/92 132 90 % -- -- None (Room air) Sitting -- --          Pertinent Labs/Diagnostic Test Results:   Radiology:  CTA chest wo w contrast   Final Interpretation by Abdulkadir Iryb MD (04/05 2117)      No acute aortic abnormality.   Smooth irregular stenosis of the right axillary artery is likely secondary to vessel tortuosity.      Bilateral lower lobe bronchial wall thickening, worse on the left where there are multifocal proximal occlusions and inferior left hilar adenopathy.   Corresponding groundglass  opacities within the basilar left lower lobe.   Findings consistent with bronchitis and an infectious infiltrate.      4 mm right lower lobe nodule.   Follow-up chest CT recommended in 12 months considering diffuse emphysematous change.      The study was marked in EPIC for immediate notification.                  Workstation performed: [id number]         CTA abdominal w run off w wo contrast   Final Interpretation by Sarai Millard MD (04/05 2053)      1.  No aortic dissection.   2.  Dilated small bowel loops in the lower abdomen/pelvis with thickened and hypoenhancing bowel wall and small amount of adjacent free fluid. Differential considerations include small bowel ischemia; other etiologies are not excluded. Correlate with    serum lactate. Recommend general surgery consultation.      The study was marked in EPIC for immediate notification.      Workstation performed: [id number]           Cardiology:  ECG 12 lead   Final Result by Ondina Hare MD (04/06 1714)   Sinus rhythm with 1st degree A-V block   Voltage criteria for left ventricular hypertrophy   Abnormal ECG   When compared with ECG of 04-Feb-2018 19:42,   No significant change was found   Confirmed by Ondina Hare ([id number]) on 4/6/2025 5:14:22 PM        GI:  No orders to display       Results from last 7 days   Lab Units 04/07/25  0442 04/06/25  0348 04/06/25  0050 04/05/25  1935   WBC Thousand/uL 14.11* 17.53* 17.74* 12.85*   HEMOGLOBIN g/dL 10.7* 11.7* 11.9* 12.3   HEMATOCRIT % 32.7* 37.1 37.7 38.7   PLATELETS Thousands/uL 211 215 212 255   TOTAL NEUT ABS Thousands/µL 11.58* 16.04* 16.86* 9.51*     Results from last 7 days   Lab Units 04/07/25  0442 04/06/25  0348 04/06/25  0050 04/05/25  1935   SODIUM mmol/L 138 138 137 139   POTASSIUM mmol/L 4.1 4.3 4.4 4.0   CHLORIDE mmol/L 101 104 106 104   CO2 mmol/L 31 26 26 26   ANION GAP mmol/L 6 8 5 9   BUN mg/dL 25 23 22 26*   CREATININE mg/dL 1.10 1.11 1.10 1.27   EGFR ml/min/1.73sq m 64 64 64 54   CALCIUM  mg/dL 8.1* 8.2* 8.0* 9.0   CALCIUM, IONIZED mmol/L  --   --  1.10*  --    MAGNESIUM mg/dL 2.3 1.6* 1.5*  --    PHOSPHORUS mg/dL  --   --  3.0  --      Results from last 7 days   Lab Units 04/06/25 0050 04/05/25 1935   AST U/L 10* 11*   ALT U/L 5* 6*   ALK PHOS U/L 67 86   TOTAL PROTEIN g/dL 5.9* 7.4   ALBUMIN g/dL 3.3* 3.9   TOTAL BILIRUBIN mg/dL 0.27 0.43     Results from last 7 days   Lab Units 04/06/25  1304   POC GLUCOSE mg/dl 132     Results from last 7 days   Lab Units 04/07/25 0442 04/06/25 0348 04/06/25 0050 04/05/25 1935   GLUCOSE RANDOM mg/dL 91 130 135 169*     Results from last 7 days   Lab Units 04/06/25 0055   PH MARBIN  7.213*   PCO2 MARBIN mm Hg 67.1*   PO2 MARBIN mm Hg 38.1   HCO3 MARBIN mmol/L 26.4   BASE EXC MARBIN mmol/L -2.6   O2 CONTENT MARBIN ml/dL 11.7   O2 HGB, VENOUS % 66.4     Results from last 7 days   Lab Units 04/06/25 0055 04/05/25 2112 04/05/25 1935   HS TNI 0HR ng/L  --   --  6   HS TNI 2HR ng/L  --  4  --    HSTNI D2 ng/L  --  -2  --    HS TNI 4HR ng/L 8  --   --    HSTNI D4 ng/L 2  --   --      Results from last 7 days   Lab Units 04/05/25 1935   PROTIME seconds 14.1   INR  1.02   PTT seconds 27     Results from last 7 days   Lab Units 04/07/25 0442   PROCALCITONIN ng/ml 1.04*     Results from last 7 days   Lab Units 04/06/25 0344 04/06/25 0050 04/05/25 2112 04/05/25 1935   LACTIC ACID mmol/L 1.6 2.4* 6.6* 2.3*     Results from last 7 days   Lab Units 04/05/25 1935   LIPASE u/L 20       Past Medical History:   Diagnosis Date    Cancer (HCC)     PROSTATE    Essential hypertension 2/4/2018    Femur fracture, right (HCC)     Gastritis 10/11/2016    Tobacco abuse 10/11/2016     Present on Admission:   Essential hypertension   Stage 3b chronic kidney disease (CKD) (HCC)      Admitting Diagnosis: Acute abdominal pain [R10.9]  Abdominal pain [R10.9]  Lung nodule [R91.1]  Intestinal obstruction, unspecified cause, unspecified whether partial or complete (HCC) [K56.609]  Age/Sex: 75 y.o.  male    Network Utilization Review Department  ATTENTION: Please call with any questions or concerns to [phone] and carefully listen to the prompts so that you are directed to the right person. All voicemails are confidential.   For Discharge needs, contact Care Management DC Support Team at [phone] opt. 2  Send all requests for admission clinical reviews, approved or denied determinations and any other requests to dedicated fax number below belonging to the campus where the patient is receiving treatment. List of dedicated fax numbers for the Facilities:  FACILITY NAME UR FAX NUMBER   ADMISSION DENIALS (Administrative/Medical Necessity) [phone]   DISCHARGE SUPPORT TEAM (NETWORK) [phone]   PARENT CHILD HEALTH (Maternity/NICU/Pediatrics) [phone]   [facility] [phone]   [facility] [phone]   [facility] [phone]   [facility] [phone]   [facility] [phone]   [facility] [phone]   [facility] [phone]   [facility] [phone]   [facility] [phone]   [facility] [phone]   [facility] [phone]   [facility] [phone]

## 2025-04-07 NOTE — PLAN OF CARE
Problem: Prexisting or High Potential for Compromised Skin Integrity  Goal: Skin integrity is maintained or improved  Description: INTERVENTIONS:- Identify patients at risk for skin breakdown- Assess and monitor skin integrity- Assess and monitor nutrition and hydration status- Monitor labs - Assess for incontinence - Turn and reposition patient- Assist with mobility/ambulation- Relieve pressure over bony prominences- Avoid friction and shearing- Provide appropriate hygiene as needed including keeping skin clean and dry- Evaluate need for skin moisturizer/barrier cream- Collaborate with interdisciplinary team - Patient/family teaching- Consider wound care consult   Outcome: Progressing     Problem: Potential for Falls  Goal: Patient will remain free of falls  Description: INTERVENTIONS:- Educate patient/family on patient safety including physical limitations- Instruct patient to call for assistance with activity - Consult OT/PT to assist with strengthening/mobility - Keep Call bell within reach- Keep bed low and locked with side rails adjusted as appropriate- Keep care items and personal belongings within reach- Initiate and maintain comfort rounds- Make Fall Risk Sign visible to staff- Offer Toileting every 2 Hours, in advance of need- Initiate/Maintain bed alarm- Obtain necessary fall risk management equipment: - Apply yellow socks and bracelet for high fall risk patients- Consider moving patient to room near nurses station  Outcome: Progressing

## 2025-04-07 NOTE — PLAN OF CARE
Problem: Prexisting or High Potential for Compromised Skin Integrity  Goal: Skin integrity is maintained or improved  Description: INTERVENTIONS:- Identify patients at risk for skin breakdown- Assess and monitor skin integrity- Assess and monitor nutrition and hydration status- Monitor labs - Assess for incontinence - Turn and reposition patient- Assist with mobility/ambulation- Relieve pressure over bony prominences- Avoid friction and shearing- Provide appropriate hygiene as needed including keeping skin clean and dry- Evaluate need for skin moisturizer/barrier cream- Collaborate with interdisciplinary team - Patient/family teaching- Consider wound care consult   4/6/2025 2235 by Debra Sue RN  Outcome: Progressing  4/6/2025 2234 by Debra Sue RN  Outcome: Progressing     Problem: Potential for Falls  Goal: Patient will remain free of falls  Description: INTERVENTIONS:- Educate patient/family on patient safety including physical limitations- Instruct patient to call for assistance with activity - Consult OT/PT to assist with strengthening/mobility - Keep Call bell within reach- Keep bed low and locked with side rails adjusted as appropriate- Keep care items and personal belongings within reach- Initiate and maintain comfort rounds- Make Fall Risk Sign visible to staff- Offer Toileting every  Hours, in advance of need- Initiate/Maintain alarm- Obtain necessary fall risk management equipment: - Apply yellow socks and bracelet for high fall risk patients- Consider moving patient to room near nurses station  Outcome: Progressing

## 2025-04-08 PROCEDURE — 99024 POSTOP FOLLOW-UP VISIT: CPT | Performed by: SURGERY

## 2025-04-08 RX ADMIN — LABETALOL HYDROCHLORIDE 10 MG: 5 INJECTION, SOLUTION INTRAVENOUS at 08:44

## 2025-04-08 RX ADMIN — CEFTRIAXONE SODIUM 1000 MG: 10 INJECTION, POWDER, FOR SOLUTION INTRAVENOUS at 00:31

## 2025-04-08 RX ADMIN — ACETAMINOPHEN 1000 MG: 10 INJECTION INTRAVENOUS at 05:15

## 2025-04-08 RX ADMIN — HEPARIN SODIUM 5000 UNITS: 5000 INJECTION INTRAVENOUS; SUBCUTANEOUS at 21:40

## 2025-04-08 RX ADMIN — HEPARIN SODIUM 5000 UNITS: 5000 INJECTION INTRAVENOUS; SUBCUTANEOUS at 14:07

## 2025-04-08 RX ADMIN — METRONIDAZOLE 500 MG: 500 INJECTION, SOLUTION INTRAVENOUS at 10:42

## 2025-04-08 RX ADMIN — ACETAMINOPHEN 1000 MG: 10 INJECTION INTRAVENOUS at 00:12

## 2025-04-08 RX ADMIN — HEPARIN SODIUM 5000 UNITS: 5000 INJECTION INTRAVENOUS; SUBCUTANEOUS at 05:09

## 2025-04-08 RX ADMIN — HYDRALAZINE HYDROCHLORIDE 10 MG: 20 INJECTION INTRAMUSCULAR; INTRAVENOUS at 10:52

## 2025-04-08 RX ADMIN — DEXTROSE AND SODIUM CHLORIDE 125 ML/HR: 5; .45 INJECTION, SOLUTION INTRAVENOUS at 08:29

## 2025-04-08 RX ADMIN — NICOTINE 1 PATCH: 21 PATCH, EXTENDED RELEASE TRANSDERMAL at 08:27

## 2025-04-08 RX ADMIN — METRONIDAZOLE 500 MG: 500 INJECTION, SOLUTION INTRAVENOUS at 21:41

## 2025-04-08 RX ADMIN — CHLORHEXIDINE GLUCONATE 15 ML: 1.2 SOLUTION ORAL at 08:27

## 2025-04-08 RX ADMIN — DEXTROSE AND SODIUM CHLORIDE 125 ML/HR: 5; .45 INJECTION, SOLUTION INTRAVENOUS at 20:34

## 2025-04-08 NOTE — ASSESSMENT & PLAN NOTE
Lab Results   Component Value Date    EGFR 64 04/07/2025    EGFR 64 04/06/2025    EGFR 64 04/06/2025    CREATININE 1.10 04/07/2025    CREATININE 1.11 04/06/2025    CREATININE 1.10 04/06/2025      complains of pain/discomfort

## 2025-04-08 NOTE — PLAN OF CARE
Problem: Prexisting or High Potential for Compromised Skin Integrity  Goal: Skin integrity is maintained or improved  Description: INTERVENTIONS:- Identify patients at risk for skin breakdown- Assess and monitor skin integrity- Assess and monitor nutrition and hydration status- Monitor labs - Assess for incontinence - Turn and reposition patient- Assist with mobility/ambulation- Relieve pressure over bony prominences- Avoid friction and shearing- Provide appropriate hygiene as needed including keeping skin clean and dry- Evaluate need for skin moisturizer/barrier cream- Collaborate with interdisciplinary team - Patient/family teaching- Consider wound care consult   Outcome: Progressing     Problem: Potential for Falls  Goal: Patient will remain free of falls  Description: INTERVENTIONS:- Educate patient/family on patient safety including physical limitations- Instruct patient to call for assistance with activity - Consult OT/PT to assist with strengthening/mobility - Keep Call bell within reach- Keep bed low and locked with side rails adjusted as appropriate- Keep care items and personal belongings within reach- Initiate and maintain comfort rounds- Make Fall Risk Sign visible to staff- Offer Toileting every 2 Hours, in advance of need- Initiate/Maintain bed/chair alarm- Obtain necessary fall risk management equipment: yellow bracelet/socks- Apply yellow socks and bracelet for high fall risk patients- Consider moving patient to room near nurses station  Outcome: Progressing     Problem: Nutrition/Hydration-ADULT  Goal: Nutrient/Hydration intake appropriate for improving, restoring or maintaining nutritional needs  Description: Monitor and assess patient's nutrition/hydration status for malnutrition. Collaborate with interdisciplinary team and initiate plan and interventions as ordered.  Monitor patient's weight and dietary intake as ordered or per policy. Utilize nutrition screening tool and intervene as  necessary. Determine patient's food preferences and provide high-protein, high-caloric foods as appropriate. INTERVENTIONS:- Monitor oral intake, urinary output, labs, and treatment plans- Assess nutrition and hydration status and recommend course of action- Evaluate amount of meals eaten- Assist patient with eating if necessary - Allow adequate time for meals- Recommend/ encourage appropriate diets, oral nutritional supplements, and vitamin/mineral supplements- Order, calculate, and assess calorie counts as needed- Recommend, monitor, and adjust tube feedings and TPN/PPN based on assessed needs- Assess need for intravenous fluids- Provide specific nutrition/hydration education as appropriate- Include patient/family/caregiver in decisions related to nutrition  Outcome: Progressing

## 2025-04-08 NOTE — CASE MANAGEMENT
Case Management Discharge Planning Note    Patient name Nav Infante  Location ICU 06/ICU 06 MRN [id number]  : 1950 Date 2025       Current Admission Date: 2025  Current Admission Diagnosis:SBO (small bowel obstruction) (HCC)   Patient Active Problem List    Diagnosis Date Noted Date Diagnosed    Lung nodule 2025     Bronchitis 2025     Tobacco use 2025     SEPSIS, UNSPECIFIED ORGANISM 2025     SBO (small bowel obstruction) (HCC) 2025     Acute abdominal pain 2025     Stage 3b chronic kidney disease (CKD) ([facility]) 2023     Intertrochanteric fracture of left femur ([facility]) 2018     Essential hypertension 2018     Gastritis 10/11/2016     Tobacco abuse 10/11/2016     Prostate cancer ([facility]) 2011       LOS (days): 3  Geometric Mean LOS (GMLOS) (days): 4.9  Days to GMLOS:2.4     OBJECTIVE:  Risk of Unplanned Readmission Score: 14.59         Current admission status: Inpatient   Preferred Pharmacy:   [id number] - [place] [address]  [address] [facility] [address]  Phone: [phone] Fax: [phone]    [email or web address] - [facility] [address] [facility]  [address] [facility] [address]  [address]  [facility] [address]  Phone: [phone] Fax: [phone]    [id number] - [place] [address] [address]  Phone: [phone] Fax: [phone]    [id number] - [place], [place] - [address] [facility]  [address]  [place] [address]  Phone: [phone] Fax: [phone]    Primary Care Provider: Murphy Marks MD    Primary Insurance: [facility]  Secondary Insurance:     DISCHARGE DETAILS:    Discharge planning discussed with:: Pt and his wife, Alexa  Freedom of Choice: Yes  Comments - Freedom of Choice: STR vs HHC pending progress    Contacts  Patient Contacts: wife, Alexa  Relationship to Patient:: Family  Contact Method: Phone  Reason/Outcome: Continuity of  Care, Emergency Contact, Discharge Planning, Referral    Other Referral/Resources/Interventions Provided:  Interventions: Short Term Rehab, HHC  Referral Comments: CM met with pt at bedside. Discussed therapy recommendations for STR. Also discussed if pt progresses the option of HHC. At this time pt would like time to think about this. He is not agreeable to referrals at this time. Aware CM will f/u in the next day or so to f/u on sending referrals. Pt feels he'll progress to home. Aware CM will also discuss with his wife. CM spoke with Alexa. Reviewed conversation. Alexa del rio CM will f/u in the next day or so to send referrals pending their decision and how pt progresses. This will also allow the time to discuss. Alexa understanding.

## 2025-04-08 NOTE — ASSESSMENT & PLAN NOTE
-with PMHx prostate cancer s/p resection and radiation (2011), HTN, cholecystectomy, current smoker (2 ppd) presenting with acute onset abdominal pain.  -CT scan concerning for ischemic small bowel, although other differentials note excluded.  -now S/p ex lap, SBR. Closed loop obstruction 2/2 adhesive band found intraoperatively with compromised segment of SB, all on 4/6  -NPO/NGT, 350cc, clamp trial today, possible NGT removal pending clamp trial  -IV fluids  -IV abx to complete 4 day total course, ceftriaxone/Flagyl through 4/10  -Med Surg  -Ambulation, out of bed, PT/OT

## 2025-04-08 NOTE — PROGRESS NOTES
Progress Note - Surgery-General   Name: Nav Infante 75 y.o. male I MRN: [id number]  Unit/Bed#: ICU 06 I Date of Admission: 4/5/2025   Date of Service: 4/8/2025 I Hospital Day: 3    Assessment & Plan  SBO (small bowel obstruction) (HCC)  -with PMHx prostate cancer s/p resection and radiation (2011), HTN, cholecystectomy, current smoker (2 ppd) presenting with acute onset abdominal pain.  -CT scan concerning for ischemic small bowel, although other differentials note excluded.  -now S/p ex lap, SBR. Closed loop obstruction 2/2 adhesive band found intraoperatively with compromised segment of SB, all on 4/6  -NPO/NGT, 350cc, clamp trial today, possible NGT removal pending clamp trial  -IV fluids  -IV abx to complete 4 day total course, ceftriaxone/Flagyl through 4/10  -Med Surg  -Ambulation, out of bed, PT/OT  Acute abdominal pain  -with PMHx prostate cancer s/p resection and radiation (2011), HTN, cholecystectomy, current smoker (2 ppd) presenting with acute onset abdominal pain.  -CT scan concerning for ischemic small bowel, although other differentials note excluded.  -See above  Essential hypertension    Stage 3b chronic kidney disease (CKD) (HCC)  Lab Results   Component Value Date    EGFR 64 04/07/2025    EGFR 64 04/06/2025    EGFR 64 04/06/2025    CREATININE 1.10 04/07/2025    CREATININE 1.11 04/06/2025    CREATININE 1.10 04/06/2025     SEPSIS, UNSPECIFIED ORGANISM          Subjective   No acute events overnight. Afebrile, hemodynamically stable. No vomiting, fevers or chills. Passing flatus, no bowel movement.      Objective :  Temp:  [97.6 °F (36.4 °C)-98.1 °F (36.7 °C)] 97.6 °F (36.4 °C)  HR:  [55-86] 55  BP: (133-180)/(63-93) 145/71  Resp:  [11-25] 18  SpO2:  [90 %-96 %] 93 %  O2 Device: Nasal cannula  Nasal Cannula O2 Flow Rate (L/min):  [2 L/min] 2 L/min    I/O         04/05 0701 04/06 0700 04/06 0701 04/07 0700    P.O.  0    I.V. (mL/kg) 3000 (42.3) 1075 (15.2)    NG/GT  0    IV Piggyback 450 450     Total Intake(mL/kg) 3450 (48.7) 1525 (21.5)    Urine (mL/kg/hr) 800 710 (0.4)    Emesis/NG output 350 750    Total Output 1150 1460    Net +2300 +65                Lines/Drains/Airways       Active Status       Name Placement date Placement time Site Days    NG/OG/Enteral Tube Nasogastric 18 Fr Right nare 04/05/25  2345  Right nare  2                  Physical Exam:  General: No acute distress, alert and oriented  HEENT: NGT in place  CV: Well perfused, regular rate and rhythm  Lungs: Normal work of breathing, no increased respiratory effort, productive cough  Abdomen: Soft, appropriately, tender, non-distended. Incision(s) clean, dry and intact.  Extremities: No edema, clubbing or cyanosis  Skin: Warm, dry        Lab Results: I have reviewed the following results:  Recent Labs     04/05/25  1935 04/05/25  2112 04/06/25  0050 04/06/25  0344 04/06/25  0348 04/07/25  0442   WBC 12.85*  --  17.74*  --    < > 14.11*   HGB 12.3  --  11.9*  --    < > 10.7*   HCT 38.7  --  37.7  --    < > 32.7*     --  212  --    < > 211   SODIUM 139  --  137  --    < > 138   K 4.0  --  4.4  --    < > 4.1     --  106  --    < > 101   CO2 26  --  26  --    < > 31   BUN 26*  --  22  --    < > 25   CREATININE 1.27  --  1.10  --    < > 1.10   GLUC 169*  --  135  --    < > 91   CAIONIZED  --   --  1.10*  --   --   --    MG  --   --  1.5*  --    < > 2.3   PHOS  --   --  3.0  --   --   --    AST 11*  --  10*  --   --   --    ALT 6*  --  5*  --   --   --    ALB 3.9  --  3.3*  --   --   --    TBILI 0.43  --  0.27  --   --   --    ALKPHOS 86  --  67  --   --   --    PTT 27  --   --   --   --   --    INR 1.02  --   --   --   --   --    HSTNI0 6  --   --   --   --   --    HSTNI2  --  4  --   --   --   --    LACTICACID 2.3* 6.6* 2.4* 1.6  --   --     < > = values in this interval not displayed.             VTE Pharmacologic Prophylaxis: VTE covered by:  heparin (porcine), Subcutaneous, 5,000 Units at 04/08/25 9153      VTE Mechanical  Prophylaxis: sequential compression device

## 2025-04-09 LAB
ANION GAP SERPL CALCULATED.3IONS-SCNC: 3 MMOL/L (ref 4–13)
ANION GAP SERPL CALCULATED.3IONS-SCNC: 3 MMOL/L (ref 4–13)
BUN SERPL-MCNC: 20 MG/DL (ref 5–25)
BUN SERPL-MCNC: 21 MG/DL (ref 5–25)
CALCIUM SERPL-MCNC: 7.3 MG/DL (ref 8.4–10.2)
CALCIUM SERPL-MCNC: 7.8 MG/DL (ref 8.4–10.2)
CHLORIDE SERPL-SCNC: 99 MMOL/L (ref 96–108)
CHLORIDE SERPL-SCNC: 99 MMOL/L (ref 96–108)
CO2 SERPL-SCNC: 29 MMOL/L (ref 21–32)
CO2 SERPL-SCNC: 30 MMOL/L (ref 21–32)
CREAT SERPL-MCNC: 1 MG/DL (ref 0.6–1.3)
CREAT SERPL-MCNC: 1.04 MG/DL (ref 0.6–1.3)
ERYTHROCYTE [DISTWIDTH] IN BLOOD BY AUTOMATED COUNT: 14.6 % (ref 11.6–15.1)
GFR SERPL CREATININE-BSD FRML MDRD: 69 ML/MIN/1.73SQ M
GFR SERPL CREATININE-BSD FRML MDRD: 72 ML/MIN/1.73SQ M
GLUCOSE SERPL-MCNC: 120 MG/DL (ref 65–140)
GLUCOSE SERPL-MCNC: 191 MG/DL (ref 65–140)
GLUCOSE SERPL-MCNC: 397 MG/DL (ref 65–140)
HCT VFR BLD AUTO: 34 % (ref 36.5–49.3)
HGB BLD-MCNC: 10.7 G/DL (ref 12–17)
MAGNESIUM SERPL-MCNC: 1.8 MG/DL (ref 1.9–2.7)
MAGNESIUM SERPL-MCNC: 1.9 MG/DL (ref 1.9–2.7)
MCH RBC QN AUTO: 28.8 PG (ref 26.8–34.3)
MCHC RBC AUTO-ENTMCNC: 31.5 G/DL (ref 31.4–37.4)
MCV RBC AUTO: 92 FL (ref 82–98)
PLATELET # BLD AUTO: 206 THOUSANDS/UL (ref 149–390)
PMV BLD AUTO: 9.9 FL (ref 8.9–12.7)
POTASSIUM SERPL-SCNC: 3.5 MMOL/L (ref 3.5–5.3)
POTASSIUM SERPL-SCNC: 3.8 MMOL/L (ref 3.5–5.3)
RBC # BLD AUTO: 3.71 MILLION/UL (ref 3.88–5.62)
SODIUM SERPL-SCNC: 131 MMOL/L (ref 135–147)
SODIUM SERPL-SCNC: 132 MMOL/L (ref 135–147)
WBC # BLD AUTO: 11.35 THOUSAND/UL (ref 4.31–10.16)

## 2025-04-09 PROCEDURE — 99024 POSTOP FOLLOW-UP VISIT: CPT | Performed by: SURGERY

## 2025-04-09 PROCEDURE — 97116 GAIT TRAINING THERAPY: CPT

## 2025-04-09 PROCEDURE — 97530 THERAPEUTIC ACTIVITIES: CPT

## 2025-04-09 PROCEDURE — 85027 COMPLETE CBC AUTOMATED: CPT

## 2025-04-09 PROCEDURE — 82948 REAGENT STRIP/BLOOD GLUCOSE: CPT

## 2025-04-09 PROCEDURE — 83735 ASSAY OF MAGNESIUM: CPT

## 2025-04-09 PROCEDURE — 97110 THERAPEUTIC EXERCISES: CPT

## 2025-04-09 PROCEDURE — 80048 BASIC METABOLIC PNL TOTAL CA: CPT

## 2025-04-09 PROCEDURE — 97167 OT EVAL HIGH COMPLEX 60 MIN: CPT

## 2025-04-09 PROCEDURE — 88307 TISSUE EXAM BY PATHOLOGIST: CPT | Performed by: PATHOLOGY

## 2025-04-09 RX ORDER — POTASSIUM CHLORIDE 1500 MG/1
20 TABLET, EXTENDED RELEASE ORAL ONCE
Status: COMPLETED | OUTPATIENT
Start: 2025-04-09 | End: 2025-04-09

## 2025-04-09 RX ORDER — DEXTROSE MONOHYDRATE, SODIUM CHLORIDE, AND POTASSIUM CHLORIDE 50; 1.49; 4.5 G/1000ML; G/1000ML; G/1000ML
75 INJECTION, SOLUTION INTRAVENOUS CONTINUOUS
Status: DISCONTINUED | OUTPATIENT
Start: 2025-04-09 | End: 2025-04-11 | Stop reason: HOSPADM

## 2025-04-09 RX ORDER — LANOLIN ALCOHOL/MO/W.PET/CERES
400 CREAM (GRAM) TOPICAL ONCE
Status: COMPLETED | OUTPATIENT
Start: 2025-04-09 | End: 2025-04-09

## 2025-04-09 RX ORDER — ACETAMINOPHEN 325 MG/1
975 TABLET ORAL EVERY 8 HOURS SCHEDULED
Status: DISCONTINUED | OUTPATIENT
Start: 2025-04-09 | End: 2025-04-11 | Stop reason: HOSPADM

## 2025-04-09 RX ORDER — METOCLOPRAMIDE HYDROCHLORIDE 5 MG/ML
5 INJECTION INTRAMUSCULAR; INTRAVENOUS EVERY 6 HOURS SCHEDULED
Status: DISPENSED | OUTPATIENT
Start: 2025-04-09 | End: 2025-04-10

## 2025-04-09 RX ORDER — OXYCODONE HYDROCHLORIDE 5 MG/1
5 TABLET ORAL EVERY 6 HOURS PRN
Refills: 0 | Status: DISCONTINUED | OUTPATIENT
Start: 2025-04-09 | End: 2025-04-11 | Stop reason: HOSPADM

## 2025-04-09 RX ADMIN — DEXTROSE, SODIUM CHLORIDE, AND POTASSIUM CHLORIDE 75 ML/HR: 5; .45; .15 INJECTION INTRAVENOUS at 10:53

## 2025-04-09 RX ADMIN — CHLORHEXIDINE GLUCONATE 15 ML: 1.2 SOLUTION ORAL at 21:22

## 2025-04-09 RX ADMIN — ACETAMINOPHEN 975 MG: 325 TABLET, FILM COATED ORAL at 09:34

## 2025-04-09 RX ADMIN — METRONIDAZOLE 500 MG: 500 INJECTION, SOLUTION INTRAVENOUS at 21:23

## 2025-04-09 RX ADMIN — HEPARIN SODIUM 5000 UNITS: 5000 INJECTION INTRAVENOUS; SUBCUTANEOUS at 05:34

## 2025-04-09 RX ADMIN — HEPARIN SODIUM 5000 UNITS: 5000 INJECTION INTRAVENOUS; SUBCUTANEOUS at 21:22

## 2025-04-09 RX ADMIN — ACETAMINOPHEN 975 MG: 325 TABLET, FILM COATED ORAL at 21:22

## 2025-04-09 RX ADMIN — POTASSIUM CHLORIDE 20 MEQ: 1500 TABLET, EXTENDED RELEASE ORAL at 09:34

## 2025-04-09 RX ADMIN — METRONIDAZOLE 500 MG: 500 INJECTION, SOLUTION INTRAVENOUS at 09:36

## 2025-04-09 RX ADMIN — METOCLOPRAMIDE 5 MG: 5 INJECTION, SOLUTION INTRAMUSCULAR; INTRAVENOUS at 23:59

## 2025-04-09 RX ADMIN — DEXTROSE AND SODIUM CHLORIDE 125 ML/HR: 5; .45 INJECTION, SOLUTION INTRAVENOUS at 04:20

## 2025-04-09 RX ADMIN — Medication 400 MG: at 09:34

## 2025-04-09 RX ADMIN — HEPARIN SODIUM 5000 UNITS: 5000 INJECTION INTRAVENOUS; SUBCUTANEOUS at 14:08

## 2025-04-09 RX ADMIN — NICOTINE 1 PATCH: 21 PATCH, EXTENDED RELEASE TRANSDERMAL at 09:35

## 2025-04-09 RX ADMIN — CEFTRIAXONE SODIUM 1000 MG: 10 INJECTION, POWDER, FOR SOLUTION INTRAVENOUS at 00:12

## 2025-04-09 NOTE — CASE MANAGEMENT
Case Management Discharge Planning Note    Patient name Nav Infante  Location S /S -01 MRN [id number]  : 1950 Date 2025       Current Admission Date: 2025  Current Admission Diagnosis:SBO (small bowel obstruction) (HCC)   Patient Active Problem List    Diagnosis Date Noted Date Diagnosed    Lung nodule 2025     Bronchitis 2025     Tobacco use 2025     SEPSIS, UNSPECIFIED ORGANISM 2025     SBO (small bowel obstruction) ([facility]) 2025     Acute abdominal pain 2025     Stage 3b chronic kidney disease (CKD) ([facility]) 2023     Intertrochanteric fracture of left femur ([facility]) 2018     Essential hypertension 2018     Gastritis 10/11/2016     Tobacco abuse 10/11/2016     Prostate cancer ([facility]) 2011       LOS (days): 4  Geometric Mean LOS (GMLOS) (days): 4.9  Days to GMLOS:1.3     OBJECTIVE:  Risk of Unplanned Readmission Score: 13.98         Current admission status: Inpatient   Preferred Pharmacy:   [id number] - [place]  [address] [facility]  [address] [facility] [address]  Phone: [phone] Fax: [phone]    [facility] - [place], [place] - [address] [facility]  [address] [facility]  [address]  [facility] [address]  Phone: [phone] Fax: [phone]    [id number]  [place] - [address] [place] [address]  Phone: [phone] Fax: [phone]    [id number] - [place], [place] - [address] [facility]  [address]  [place] [address]  Phone: [phone] Fax: [phone]    Primary Care Provider: Murphy Marks MD    Primary Insurance: [facility]  Secondary Insurance:     DISCHARGE DETAILS:    Discharge planning discussed with:: Patient     Comments - [place] of Choice: CM met with Pt to follow up on his d/c decision regarding home with [facility] services versus the recommendation of SNF. Pt reported his refusal of HHC services and SNF, stating he would prefer to  go home without any services.         Requested Home Health Care         Is the patient interested in HHC at discharge?: No    DME Referral Provided  Referral made for DME?: No    Other Referral/Resources/Interventions Provided:  Referral Comments: Pt is refusing HHC services and the recommended SNF placement.      Treatment Team Recommendation: Short Term Rehab  Discharge Destination Plan:: Home  Transport at Discharge : Family

## 2025-04-09 NOTE — OCCUPATIONAL THERAPY NOTE
Occupational Therapy Evaluation     Patient Name: Nav Infante  Today's Date: 4/9/2025  Problem List  Principal Problem:    SBO (small bowel obstruction) (HCC)  Active Problems:    Essential hypertension    Stage 3b chronic kidney disease (CKD) (HCC)    Acute abdominal pain    Lung nodule    Bronchitis    Tobacco use    SEPSIS, UNSPECIFIED ORGANISM    Past Medical History  Past Medical History:   Diagnosis Date    Cancer (HCC)     PROSTATE    Essential hypertension 2/4/2018    Femur fracture, right (HCC)     Gastritis 10/11/2016    Tobacco abuse 10/11/2016     Past Surgical History  Past Surgical History:   Procedure Laterality Date    CHOLECYSTECTOMY      EXPLORATORY LAPAROTOMY W/ BOWEL RESECTION N/A 4/5/2025    Procedure: LAPAROTOMY EXPLORATORY W/ SMALL BOWEL RESECTION WITH ANASTOMOSIS;  Surgeon: Hilario Singh DO;  [facility];  Service: General    FRACTURE SURGERY      LAPAROTOMY N/A 4/5/2025    Procedure: LAPAROTOMY EXPLORATORY;  Surgeon: Hilario Singh DO;  [facility];  Service: General    HI OPTX FEM SHFT FX W/INSJ IMED IMPLT W/WO SCREW Left 2/5/2018    Procedure: LEFT HIP SHORT TROCHANTERIC FEMORAL NAIL;  Surgeon: Ej Hall MD;  [facility];  Service: Orthopedics    PROSTATECTOMY  2011 04/09/25 0912   OT Last Visit   OT Visit Date 04/09/25  (Wednesday)   Note Type   Note type Evaluation  (Eval 2826-5643)   Additional Comments Identified pt by full name and birthdate   Pain Assessment   Pain Assessment Tool FLACC   Pain Location/Orientation Location: [facility]   Hospital Pain Intervention(s) Repositioned;Ambulation/increased activity;Emotional support   Pain Rating: FLACC (Rest) - Face 1   Pain Rating: FLACC (Rest) - Legs 0   Pain Rating: FLACC (Rest) - Activity 0   Pain Rating: FLACC (Rest) - Cry 0   Pain Rating: FLACC (Rest) - Consolability 0   Score: FLACC (Rest) 1   Pain Rating: FLACC (Activity) - Face 1   Pain Rating: FLACC (Activity) -  "Legs 1   Pain Rating: FLACC (Activity) - Activity 1   Pain Rating: FLACC (Activity) - Cry 1   Pain Rating: FLACC (Activity) - Consolability 1   Score: FLACC (Activity) 5   Restrictions/Precautions   Weight Bearing Precautions Per Order No   Other Precautions Chair Alarm;Bed Alarm;Fall Risk;Pain  (Juan Jose, room air, L eye visual impairment)   Home Living   Type of Home House   Home Layout Two level;1/2 bath on main level;Bed/bath upstairs  (1STE)   Bathroom Shower/Tub Tub/shower unit   Bathroom Toilet Standard   Bathroom Equipment Grab bars in shower   Bathroom Accessibility Accessible   Home Equipment Walker;Cane;Grab bars  (shower chair per PT eval)   Additional Comments Pt reports living w/ wife   Prior Function   Level of [place] Independent with ADLs;Independent with functional mobility;Independent with IADLS   Lives With Spouse   Receives Help From Family   Falls in the last 6 months 0   Vocational Part time employment   Comments Pt reports I w/ ADLs at baseline w/ out use of AD or O2.   Lifestyle   Autonomy Pt reports I w/ ADLs at baseline w/ out use of AD or O2   Reciprocal Relationships Pt reports living w/ wife. 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 Diagnosed w/ small bowel obstruction. DC NG tube on 4/8/25 and transfer to med surg on 4/7/25   Family/Caregiver Present No   Additional General Comments Personal and environmental factors supporting performance includes independent at baseline, supportive family; barriers include multi level home, pain   Subjective   Subjective \"I think that I need to use the bathroom again\" (stated upon sitting in the chair)   ADL   Where Assessed Edge of bed  (vs bathroom)   Eating Assistance 6  Modified independent   Eating Deficit " Setup   Grooming Assistance 4  Minimal Assistance   Grooming Deficit Setup;Steadying;Standing with assistive device;Increased time to complete;Supervision/safety   UB Bathing Assistance Unable to assess   LB Bathing Assistance Unable to assess   UB Dressing Assistance 5  Supervision/Setup   UB Dressing Deficit Setup;Verbal cueing;Increased time to complete;Supervision/safety  (seated at EOB)   LB Dressing Assistance 4  Minimal Assistance   LB Dressing Deficit Setup;Verbal cueing;Supervision/safety;Increased time to complete;Steadying;Thread LLE into pants;Thread RLE into pants;Pull up over hips   Toileting Assistance  4  Minimal Assistance   Toileting Deficit Setup;Grab bar use;Perineal hygiene  (assist for thoroughness of hygiene / wipind due to multiple lines)   Additional Comments on 1L O2 sats >90%,  during toileting. On room air O2 sats dropped to 85%. RNLatoya aware   Bed Mobility   Supine to Sit 5  Supervision   Additional items Assist x 1;Bedrails;Increased time required  (to pt's L)   Sit to Supine Unable to assess   Additional Comments Pt seated OOB in chair post eval w/ needs met, call bell in reach and PTAHenry present   Transfers   Sit to Stand 5  Supervision   Additional items Assist x 1;Bedrails;Armrests;Increased time required;Verbal cues   Stand to Sit 5  Supervision   Additional items Assist x 1;Bedrails;Armrests;Increased time required;Verbal cues   Toilet transfer 4  Minimal assistance   Additional items Assist x 1;Increased time required;Raised toilet seat  (raised perry toilet in bathroom w/ L grab bar)   Additional Comments Pt performed sit <> stand 3X w/ S to / from bed, chair and min A from toilet   Functional Mobility   Functional Mobility 4  Minimal assistance   Additional Comments to / from bathroom on 1L O2 using RW. Educated pt on body position w/ in walker frame   Additional items Rolling walker   Balance   Static Sitting Fair +   Dynamic Sitting Fair -   Ambulatory   (poor  + <> fair -)   Activity Tolerance   Activity Tolerance Patient limited by fatigue;Patient limited by pain   Medical Staff Made Aware care coordination w/ Henry HARDING for portion of session due to decreased activity tolerance, regression from baseline and skilled assitsance anticiapted. Spoke w/ CM   Nurse Made Aware spoke w/ Latoya GEE Assessment   RUE Assessment WFL   RUE Strength   RUE Overall Strength Within Functional Limits - able to perform ADL tasks with strength  (observed, able to participate in ADLs)   LUE Assessment   LUE Assessment WFL   LUE Strength   LUE Overall Strength Within Functional Limits - able to perform ADL tasks with strength  (observed, able to participate in ADLs)   Hand Function   Gross Motor Coordination Functional   Vision-Basic Assessment   Current Vision Wears glasses only for reading  (and tv; not present)   Cognition   Overall Cognitive Status   (alert, generally oriented. Able to follow directions / communicate wants / needs during ADLs)   Arousal/Participation Alert;Cooperative   Attention Attends with cues to redirect   Orientation Level Oriented to person;Oriented to place;Oriented to situation   Memory   (appears WFL during conversation, social history)   Following Commands Follows multistep commands with increased time or repetition   Comments Alert, able to provide PLOF, follow directions during ADLs w/ flat affect.   Assessment   Limitation Decreased ADL status;Decreased UE strength;Decreased endurance;Decreased self-care trans;Decreased high-level ADLs  (impaired pain, activity tolerance, standing tolerance, balance, forward functional reach)   Assessment Pt is a 76yo male admitted to ICU at [facility] on 4/5/25. Diagnosed w/ small bowel obstruction and is s/p ex-lap w/ small bowel resection on 4/5/25. Significant PMH impacting his occupational performance includes tobacco use / abuse,L femur fracture (2018), prostate cancer hx/ tx (2011). Pt reports living w/ his wife in 2  SH and I w/ ADLs w/ out use of AD or O2. Upon eval, pt alert and able to follow directions, communicate wants / needs w/ flat affect. O2 sats 85-88% on room air upon therapist arrival. RN aware and placed back on 1 L. Pt required min A grooming, S UBD seated w/ +time, min A LBD, min A toileting, S bed mobility, S sit <> stand and min A using RW for functional mobility. Pt is completing ADLs below baseline level of I and would benefit from OT in acute care to max I w/ ADLs, achieve highest level of function. Recommend level III rehab resource intensity when medically stable for discharge from acute care pend improved activity tolerance, support / assist at PLOF, and ability to manage stairs. Will continue to follow   Goals   Patient Goals Pt stated that he wants to have decreased pain, return home   LTG Time Frame 7-10   Long Term Goal see below   Plan   Treatment Interventions ADL retraining;Functional transfer training;UE strengthening/ROM;Endurance training;Patient/family training;Equipment evaluation/education;Compensatory technique education;Continued evaluation;Energy conservation;Activityengagement   Goal Expiration Date 04/19/25   OT Treatment Day 0  (Wed 4/9/25)   OT Frequency 3-5x/wk   Discharge Recommendation   Rehab Resource Intensity Level, OT III (Minimum Resource Intensity)  (pend improved activity tolerance, support / assist at PLOF, ability to manage stairs)   Equipment Recommended Bedside commode;Shower/Tub chair with back ($)   Commode Type Standard   AM-PAC Daily Activity Inpatient   Lower Body Dressing 3   Bathing 3   Toileting 3   Upper Body Dressing 3   Grooming 4   Eating 4   Daily Activity Raw Score 20   Daily Activity Standardized Score (Calc for Raw Score >=11) 42.03   AM-PAC Applied Cognition Inpatient   Following a Speech/Presentation 3   Understanding Ordinary Conversation 4   Taking Medications 3   Remembering Where Things Are Placed or Put Away 4   Remembering List of 4-5 Errands 4    Taking Care of Complicated Tasks 3   Applied Cognition Raw Score 21   Applied Cognition Standardized Score 44.3   Barthel Index   Feeding 5   Bathing 0   Grooming Score 0   Dressing Score 5   Bladder Score 10   Bowels Score 10   Toilet Use Score 5   Transfers (Bed/Chair) Score 10   Mobility (Level Surface) Score 0   Stairs Score 5   Barthel Index Score 50   End of Consult   Education Provided Yes   Patient Position at End of Consult Bedside chair;Bed/Chair alarm activated;All needs within reach   Nurse Communication Nurse aware of consult   Licensure   [place] License Number  Anna Sky, OTR/L      04/09/25 0912   OT Last Visit   OT Visit Date 04/09/25  (Wednesday)   Note Type   Note type Evaluation  (Eval 3610-9526)   Additional Comments Identified pt by full name and birthdate   Pain Assessment   Pain Assessment Tool FLACC   Pain Location/Orientation Location: Abdomen   Hospital Pain Intervention(s) Repositioned;Ambulation/increased activity;Emotional support   Pain Rating: FLACC (Rest) - Face 1   Pain Rating: FLACC (Rest) - Legs 0   Pain Rating: FLACC (Rest) - Activity 0   Pain Rating: FLACC (Rest) - Cry 0   Pain Rating: FLACC (Rest) - Consolability 0   Score: FLACC (Rest) 1   Pain Rating: FLACC (Activity) - Face 1   Pain Rating: FLACC (Activity) - Legs 1   Pain Rating: FLACC (Activity) - Activity 1   Pain Rating: FLACC (Activity) - Cry 1   Pain Rating: FLACC (Activity) - Consolability 1   Score: FLACC (Activity) 5   Restrictions/Precautions   Weight Bearing Precautions Per Order No   Other Precautions Chair Alarm;Bed Alarm;Fall Risk;Pain  (Juan Jose, room air, L eye visual impairment)   Home Living   Type of Home House   Home Layout Two level;1/2 bath on main level;Bed/bath upstairs  (1STE)   Bathroom Shower/Tub Tub/shower unit   Bathroom Toilet Standard   Bathroom Equipment Grab bars in shower   Bathroom Accessibility Accessible   Home Equipment Walker;Cane;Grab bars  (shower chair per PT eval)   Additional  "Comments Pt reports living w/ wife   Prior Function   Level of [place] Independent with ADLs;Independent with functional mobility;Independent with IADLS   Lives With Spouse   Receives Help From Family   Falls in the last 6 months 0   Vocational Part time employment   Comments Pt reports I w/ ADLs at baseline w/ out use of AD or O2.   Lifestyle   Autonomy Pt reports I w/ ADLs at baseline w/ out use of AD or O2   Reciprocal Relationships Pt reports living w/ wife. 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 Diagnosed w/ small bowel obstruction. DC NG tube on 4/8/25 and transfer to med surg on 4/7/25   Family/Caregiver Present No   Additional General Comments Personal and environmental factors supporting performance includes independent at baseline, supportive family; barriers include multi level home, pain   Subjective   Subjective \"I think that I need to use the bathroom again\" (stated upon sitting in the chair)   ADL   Where Assessed Edge of bed  (vs bathroom)   Eating Assistance 6  Modified independent   Eating Deficit Setup   Grooming Assistance 4  Minimal Assistance   Grooming Deficit Setup;Steadying;Standing with assistive device;Increased time to complete;Supervision/safety   UB Bathing Assistance Unable to assess   LB Bathing Assistance Unable to assess   UB Dressing Assistance 5  Supervision/Setup   UB Dressing Deficit Setup;Verbal cueing;Increased time to complete;Supervision/safety  (seated at EOB)   LB Dressing Assistance 4  Minimal Assistance   LB Dressing Deficit Setup;Verbal cueing;Supervision/safety;Increased time to complete;Steadying;Thread LLE into pants;Thread RLE into pants;Pull up over hips   Toileting Assistance  4  Minimal Assistance   Toileting Deficit " Setup;Grab bar use;Perineal hygiene  (assist for thoroughness of hygiene / wipind due to multiple lines)   Additional Comments on 1L O2 sats >90%,  during toileting. On room air O2 sats dropped to 85%. Latoya GEE aware   Bed Mobility   Supine to Sit 5  Supervision   Additional items Assist x 1;Bedrails;Increased time required  (to pt's L)   Sit to Supine Unable to assess   Additional Comments Pt seated OOB in chair post eval w/ needs met, call bell in reach and Henry HARDNIG present   Transfers   Sit to Stand 5  Supervision   Additional items Assist x 1;Bedrails;Armrests;Increased time required;Verbal cues   Stand to Sit 5  Supervision   Additional items Assist x 1;Bedrails;Armrests;Increased time required;Verbal cues   Toilet transfer 4  Minimal assistance   Additional items Assist x 1;Increased time required;Raised toilet seat  (raised perry toilet in bathroom w/ L grab bar)   Additional Comments Pt performed sit <> stand 3X w/ S to / from bed, chair and min A from toilet   Functional Mobility   Functional Mobility 4  Minimal assistance   Additional Comments to / from bathroom on 1L O2 using RW. Educated pt on body position w/ in walker frame   Additional items Rolling walker   Balance   Static Sitting Fair +   Dynamic Sitting Fair -   Ambulatory   (poor + <> fair -)   Activity Tolerance   Activity Tolerance Patient limited by fatigue;Patient limited by pain   Medical Staff Made Aware care coordination w/ Henry HARDING for portion of session due to decreased activity tolerance, regression from baseline and skilled assitsance anticiapted. Spoke w/ CM   Nurse Made Aware spoke w/ Latoya GEE   RUSHIELA Assessment   RUE Assessment WFL   RUE Strength   RUE Overall Strength Within Functional Limits - able to perform ADL tasks with strength  (observed, able to participate in ADLs)   LUE Assessment   LUE Assessment WFL   LUE Strength   LUE Overall Strength Within Functional Limits - able to perform ADL tasks with  strength  (observed, able to participate in ADLs)   Hand Function   Gross Motor Coordination Functional   Vision-Basic Assessment   Current Vision Wears glasses only for reading  (and tv; not present)   Cognition   Overall Cognitive Status   (alert, generally oriented. Able to follow directions / communicate wants / needs during ADLs)   Arousal/Participation Alert;Cooperative   Attention Attends with cues to redirect   Orientation Level Oriented to person;Oriented to place;Oriented to situation   Memory   (appears WFL during conversation, social history)   Following Commands Follows multistep commands with increased time or repetition   Comments Alert, able to provide PLOF, follow directions during ADLs w/ flat affect.   Assessment   Limitation Decreased ADL status;Decreased UE strength;Decreased endurance;Decreased self-care trans;Decreased high-level ADLs  (impaired pain, activity tolerance, standing tolerance, balance, forward functional reach)   Assessment Pt is a 76yo male admitted to ICU at [facility] on 4/5/25. Diagnosed w/ small bowel obstruction and is s/p ex-lap w/ small bowel resection on 4/5/25. Significant PMH impacting his occupational performance includes tobacco use / abuse,L femur fracture (2018), prostate cancer hx/ tx (2011). Pt reports living w/ his wife in 2 SH and I w/ ADLs w/ out use of AD or O2. Upon eval, pt alert and able to follow directions, communicate wants / needs w/ flat affect. O2 sats 85-88% on room air upon therapist arrival. RN aware and placed back on 1 L. Pt required min A grooming, S UBD seated w/ +time, min A LBD, min A toileting, S bed mobility, S sit <> stand and min A using RW for functional mobility. Pt is completing ADLs below baseline level of I and would benefit from OT in acute care to max I w/ ADLs, achieve highest level of function. Recommend level III rehab resource intensity when medically stable for discharge from acute care pend improved activity tolerance, support /  assist at PLOF, and ability to manage stairs. Will continue to follow   Goals   Patient Goals Pt stated that he wants to have decreased pain, return home   LTG Time Frame 7-10   Long Term Goal see below   Plan   Treatment Interventions ADL retraining;Functional transfer training;UE strengthening/ROM;Endurance training;Patient/family training;Equipment evaluation/education;Compensatory technique education;Continued evaluation;Energy conservation;Activityengagement   Goal Expiration Date 04/19/25   OT Treatment Day 0  (Wed 4/9/25)   OT Frequency 3-5x/wk   Discharge Recommendation   Rehab Resource Intensity Level, OT III (Minimum Resource Intensity)  (pend improved activity tolerance, support / assist at PLOF, ability to manage stairs)   Equipment Recommended Bedside commode;Shower/Tub chair with back ($)   Commode Type Standard   AM-PAC Daily Activity Inpatient   Lower Body Dressing 3   Bathing 3   Toileting 3   Upper Body Dressing 3   Grooming 4   Eating 4   Daily Activity Raw Score 20   Daily Activity Standardized Score (Calc for Raw Score >=11) 42.03   AM-PAC Applied Cognition Inpatient   Following a Speech/Presentation 3   Understanding Ordinary Conversation 4   Taking Medications 3   Remembering Where Things Are Placed or Put Away 4   Remembering List of 4-5 Errands 4   Taking Care of Complicated Tasks 3   Applied Cognition Raw Score 21   Applied Cognition Standardized Score 44.3   Barthel Index   Feeding 5   Bathing 0   Grooming Score 0   Dressing Score 5   Bladder Score 10   Bowels Score 10   Toilet Use Score 5   Transfers (Bed/Chair) Score 10   Mobility (Level Surface) Score 0   Stairs Score 5   Barthel Index Score 50   End of Consult   Education Provided Yes   Patient Position at End of Consult Bedside chair;Bed/Chair alarm activated;All needs within reach   Nurse Communication Nurse aware of consult   Licensure   [place] License Number  Anna Sky, OTR/L        The patient's raw score on the AM-PAC Daily  Activity Inpatient Short Form is 20. A raw score of greater than or equal to 19 suggests the patient may benefit from discharge to home. Please refer to the recommendation of the Occupational Therapist for safe discharge planning.      Pt goals to be met by 4/19/25 to max I w/ ADLs and improve engagement to return home to PLOF includes:    -Pt will complete bed mobility supine <> sit independently in preparation for ADLs    -Pt will complete UBD independently    -Pt will complete LBD w/ mod I for + time w/ out rest break    -Pt will complete functional transfers to bed, chair, and toilet using LRAD, DME as needed w/ mod I    -Pt will demonstrate improved functional sitting tolerance OOB in chair for all meals    -Pt will demonstrate improved functional standing tolerance for at least 15 minutes using LRAD w/ at least fair + balance while engaged in grooming in stance w/ mod I    -Pt will demonstrate improved AMPAC score to at least 23 to max I w/ ADLs, assist in DC planning.     -Pt will demonstrate good attention and understanding EC tech to max I w/ ADLs, improve engagement.     -Pt will consistently engage in functional mobility using LRAD household distances w/ mod I      Anna Sky OTR/L  [id number]  [id number]

## 2025-04-09 NOTE — PHYSICAL THERAPY NOTE
PHYSICAL THERAPY NOTE          Patient Name: Nav Infante  Today's Date: 4/9/2025 04/09/25 0930   PT Last Visit   PT Visit Date 04/09/25   Note Type   Note Type Treatment   Pain Assessment   Pain Assessment Tool FLACC   Pain Location/Orientation Location: Abdomen   Pain Onset/Description Other (Comment)  (tenderness reported)   Patient's Stated Pain Goal No pain   Hospital Pain Intervention(s) Repositioned;Ambulation/increased activity;Rest   Multiple Pain Sites No   Pain Rating: FLACC (Rest) - Face 1   Pain Rating: FLACC (Rest) - Legs 0   Pain Rating: FLACC (Rest) - Activity 0   Pain Rating: FLACC (Rest) - Cry 0   Pain Rating: FLACC (Rest) - Consolability 0   Score: FLACC (Rest) 1   Pain Rating: FLACC (Activity) - Face 1   Pain Rating: FLACC (Activity) - Legs 1   Pain Rating: FLACC (Activity) - Activity 1   Pain Rating: FLACC (Activity) - Cry 1   Pain Rating: FLACC (Activity) - Consolability 1   Score: FLACC (Activity) 5   Restrictions/Precautions   Weight Bearing Precautions Per Order No   Other Precautions Chair Alarm;Bed Alarm;Fall Risk;Pain  (masimo, room air pre tx session, 1L NC 02 post tx session, L eye impairment)   General   Chart Reviewed Yes   Additional Pertinent History pt Sp02 decreased to 87% on room air with ambulation as pt HR increased to 115   Response to Previous Treatment Patient with no complaints from previous session.   Family/Caregiver Present No   Cognition   Overall Cognitive Status   (pt was cooperative in todays tx session, lane to follow directions for safety and OOB activities)   Arousal/Participation Alert;Responsive;Cooperative   Attention Attends with cues to redirect   Orientation Level Oriented to person;Oriented to place;Oriented to situation;Disoriented to time   Memory Unable to assess   Following Commands Follows multistep commands with increased time or repetition   Comments pt was  cooperative in todays tx session, followed directions for safety and completing activities   Subjective   Subjective pt reports abdominal tenderness by incision site, pt was agreeable to participate in PT interventions   Bed Mobility   Supine to Sit 5  Supervision   Additional items Assist x 1;HOB elevated;Bedrails;Increased time required   Sit to Supine Unable to assess   Additional Comments pt seated OOB in tehr ecliner post tx w/ call bell, chair alarm activated, legs elevated and all pt needs met   Transfers   Sit to Stand 5  Supervision   Additional items Assist x 1;Increased time required;Verbal cues   Stand to Sit 5  Supervision   Additional items Assist x 1;Armrests;Increased time required;Verbal cues   Stand pivot 5  Supervision   Additional items Assist x 1;Armrests;Increased time required;Verbal cues   Toilet transfer 4  Minimal assistance   Additional items Assist x 1;Increased time required;Verbal cues;Raised toilet seat  (raised perry toilet with L sided grab bar)   Additional Comments pt completed multiple functional transfers in [facility] tx session with and w/o an AD. pt was /s for transfers from EOB, min Ax1 from perry toilet and mod I from recliner as pt demonstrated good recall on hand placement in order to increase safety and balance with transfers   Ambulation/Elevation   Gait pattern Decreased foot clearance;Short stride;Foward flexed;Excessively slow   Gait Assistance 4  Minimal assist  (/s to min ax1 w/ RW due to 1 LOB)   Additional items Assist x 1;Verbal cues   Assistive Device Rolling walker   Distance 80'x1 RW 15'x1 RW   Stair Management Assistance 5  Supervision   Additional items Assist x 1;Verbal cues;Increased time required   Stair Management Technique Two rails;Step to pattern;Foreward;Backward   Number of Stairs 3   Ambulation/Elevation Additional Comments 90% of ambulation was /s but pt required min ax1 due to 1 LOB duting initial phase of ambulation   Balance   Static Sitting Fair +    Dynamic Sitting Fair   Static Standing Poor +  (w/ RW)   Ambulatory Poor +  (fair- to poor +)   Endurance Deficit   Endurance Deficit Yes   Endurance Deficit Description limited activity tolerance, ambulation distance   Activity Tolerance   Activity Tolerance Patient limited by fatigue;Other (Comment)  (generalized weakness)   Nurse Made Aware Spoke to RN Latoya   Exercises   Quad Sets Sitting;10 reps;AROM;Bilateral  (long sit position)   Hip Abduction Sitting;15 reps;AROM;Bilateral   Hip Adduction Sitting;15 reps;AROM;Bilateral  (pillow squeezes)   Knee AROM Long Arc Quad Sitting;15 reps;AROM;Bilateral   Ankle Pumps Sitting;20 reps;AROM;Bilateral   Marching Sitting;10 reps;AROM;Bilateral   Assessment   Problem List Decreased strength;Decreased endurance;Impaired balance;Decreased mobility;Decreased safety awareness;Impaired vision;Pain   Assessment pt began tx session lying supine in the bed as pt was agreeable to participate in PT interventions. Care coordination with OT Anna due to pt anticipated level of assistance for OOB mobility and pt limited activity tolerance. Progress was noted throughout [facility] tx session as pt was able to complete a supine<>sit EOB transfer with /s. pt was able to sit EOB w/o LOB while donning jogging pants and being educated on transfers with RW. Progress was noted with functional transfers as pt was /s for STS from EOB w/ RW, min ax1 from raised perry toilet w/ RW and mod I for transfers from recliner with and w/o an AD, good recall on hand placement. pt did increase his activity tolerance and ambulation distance as pt ambulated 80'x1 RW 15'x1 RW with close /s to min Ax1 due to 1 LOB. pt with SOB during ambulation as pt required several seated therapeutic rest breaks in [facility] tx session. pt Sp02 decreased to 87% w/ ambulation on room air as pt was placed on 1L NC 02. pt participate in TE activities w/ AROM, no increases in pain in order to strengthen LE's and increase pt activity  tolerance. pt was able to complete 3 steps in todays tx session w/ bilateral hand rails, close /s, no LOB. Post tx session pt in [facility] recliner with call bell, chair alarm activated and all pt needs met. pt would benefit from cotninued skilled PT interventions in order to increase his activity tolerance and ambulation distance. Cotninue to recomemnd DC w/ level 2 moderate rehab resource intensity when medically cleared   Goals   Patient Goals none stated   STG Expiration Date 04/21/25   PT Treatment Day 2   Plan   Treatment/Interventions Functional transfer training;LE strengthening/ROM;Elevations;Therapeutic exercise;Endurance training;Patient/family training;Equipment eval/education;Bed mobility;Gait training;Spoke to nursing;Compensatory technique education   Progress Progressing toward goals   PT Frequency 3-5x/wk   Discharge Recommendation   Rehab Resource Intensity Level, PT II (Moderate Resource Intensity)   AM-PAC Basic Mobility Inpatient   Turning in Flat Bed Without Bedrails 3   Lying on Back to Sitting on Edge of Flat Bed Without Bedrails 3   Moving Bed to Chair 3   Standing Up From Chair Using Arms 3   Walk in Room 3   Climb 3-5 Stairs With Railing 3   Basic Mobility Inpatient Raw Score 18   Basic Mobility Standardized Score 41.05   [facility] Highest Level Of Mobility   -HLM Goal 6: Walk 10 steps or more   -HLM Achieved 7: Walk 25 feet or more   Education   Education Provided Mobility training;Assistive device;Other  (TE activities, stair trial)   Patient Demonstrates acceptance/verbal understanding   End of Consult   Patient Position at End of Consult Bedside chair;Bed/Chair alarm activated;All needs within reach   The patient's AM-PAC Basic Mobility Inpatient Short Form Raw Score is 18. A Raw score of greater than 16 suggests the patient may benefit from discharge to home. Please also refer to the recommendation of the Physical Therapist for safe discharge planning.    Pt progressing with skilled  PT interventions but remains limited with activity tolerance, ambulation distance due to fatigue, SOB. Pt would benefit from continued skilled PT interventions in order to address deficits listed above   Henry Yost

## 2025-04-09 NOTE — ASSESSMENT & PLAN NOTE
Lab Results   Component Value Date    EGFR 72 04/09/2025    EGFR 64 04/07/2025    EGFR 64 04/06/2025    CREATININE 1.00 04/09/2025    CREATININE 1.10 04/07/2025    CREATININE 1.11 04/06/2025

## 2025-04-09 NOTE — PLAN OF CARE
Problem: OCCUPATIONAL THERAPY ADULT  Goal: Performs self-care activities at highest level of function for planned discharge setting.  See evaluation for individualized goals.  Description: Treatment Interventions: ADL retraining, Functional transfer training, UE strengthening/ROM, Endurance training, Patient/family training, Equipment evaluation/education, Compensatory technique education, Continued evaluation, Energy conservation, Activityengagement  Equipment Recommended: Bedside commode, Shower/Tub chair with back ($)       See flowsheet documentation for full assessment, interventions and recommendations.   Note: Limitation: Decreased ADL status, Decreased UE strength, Decreased endurance, Decreased self-care trans, Decreased high-level ADLs (impaired pain, activity tolerance, standing tolerance, balance, forward functional reach)     Assessment: Pt is a 74yo male admitted to ICU at [facility] on 4/5/25. Diagnosed w/ small bowel obstruction and is s/p ex-lap w/ small bowel resection on 4/5/25. Significant PMH impacting his occupational performance includes tobacco use / abuse,L femur fracture (2018), prostate cancer hx/ tx (2011). Pt reports living w/ his wife in 2 SH and I w/ ADLs w/ out use of AD or O2. Upon eval, pt alert and able to follow directions, communicate wants / needs w/ flat affect. O2 sats 85-88% on room air upon therapist arrival. RN aware and placed back on 1 L. Pt required min A grooming, S UBD seated w/ +time, min A LBD, min A toileting, S bed mobility, S sit <> stand and min A using RW for functional mobility. Pt is completing ADLs below baseline level of I and would benefit from OT in acute care to max I w/ ADLs, achieve highest level of function. Recommend level III rehab resource intensity when medically stable for discharge from acute care pend improved activity tolerance, support / assist at PLOF, and ability to manage stairs. Will continue to follow     Rehab Resource Intensity Level, OT:  III (Minimum Resource Intensity) (pend improved activity tolerance, support / assist at PLOF, ability to manage stairs)

## 2025-04-09 NOTE — ASSESSMENT & PLAN NOTE
-with PMHx prostate cancer s/p resection and radiation (2011), HTN, cholecystectomy, current smoker (2 ppd) presenting with acute onset abdominal pain.  -CT scan concerning for ischemic small bowel, although other differentials note excluded.  -See above   MARINO Watson np

## 2025-04-09 NOTE — ASSESSMENT & PLAN NOTE
-with PMHx prostate cancer s/p resection and radiation (2011), HTN, cholecystectomy, current smoker (2 ppd) presenting with acute onset abdominal pain.  -CT scan concerning for ischemic small bowel, although other differentials note excluded.  -now S/p ex lap, SBR. Closed loop obstruction 2/2 adhesive band found intraoperatively with compromised segment of SB, all on 4/6  -NGT removed after clamp trial 4/8  -Clear liquids, fluids until tolerating adequate p.o.  -IV abx to complete 4 day total course, ceftriaxone/Flagyl through 4/10  - PT/OT recommending level 2 disposition, patient declining acute rehab, case management aware, working on VNA, disposition planning

## 2025-04-09 NOTE — PROGRESS NOTES
Progress Note - Surgery-General   Name: Nav Infante 75 y.o. male I MRN: [id number]  Unit/Bed#: S -01 I Date of Admission: 4/5/2025   Date of Service: 4/9/2025 I Hospital Day: 4    Assessment & Plan  SBO (small bowel obstruction) ([facility])  -with PMHx prostate cancer s/p resection and radiation (2011), HTN, cholecystectomy, current smoker (2 ppd) presenting with acute onset abdominal pain.  -CT scan concerning for ischemic small bowel, although other differentials note excluded.  -now S/p ex lap, SBR. Closed loop obstruction 2/2 adhesive band found intraoperatively with compromised segment of SB, all on 4/6  -NGT removed after clamp trial 4/8  -Clear liquids, fluids until tolerating adequate p.o.  -IV abx to complete 4 day total course, ceftriaxone/Flagyl through 4/10  - PT/OT recommending level 2 disposition, patient declining acute rehab, case management aware, working on VNA, disposition planning  Acute abdominal pain  -with PMHx prostate cancer s/p resection and radiation (2011), HTN, cholecystectomy, current smoker (2 ppd) presenting with acute onset abdominal pain.  -CT scan concerning for ischemic small bowel, although other differentials note excluded.  -See above  Essential hypertension    Stage 3b chronic kidney disease (CKD) (HCC)  Lab Results   Component Value Date    EGFR 72 04/09/2025    EGFR 64 04/07/2025    EGFR 64 04/06/2025    CREATININE 1.00 04/09/2025    CREATININE 1.10 04/07/2025    CREATININE 1.11 04/06/2025     SEPSIS, UNSPECIFIED ORGANISM          Subjective   Doing well, some abdominal pain, tolerated a little bit of liquids yesterday but not much, no nausea or emesis, passing flatus no bowel movements, voiding, using I-S.    Objective :  Temp:  [97.8 °F (36.6 °C)-98.3 °F (36.8 °C)] 97.8 °F (36.6 °C)  HR:  [57-95] 74  BP: (132-187)/(72-96) 139/76  Resp:  [16-37] 17  SpO2:  [89 %-100 %] 95 %  O2 Device: None (Room air)  Nasal Cannula O2 Flow Rate (L/min):  [1 L/min-2 L/min] 1  L/min    I/O         04/07 0701  04/08 0700 04/08 0701  04/09 0700 04/09 0701  04/10 0700    P.O. 0 0     I.V. (mL/kg) 2 (0) 1330.4 (18.8) 0.2 (0)    NG/GT  0     IV Piggyback       Total Intake(mL/kg) 2 (0) 1330.4 (18.8) 0.2 (0)    Urine (mL/kg/hr) 770 (0.5) 975 (0.6)     Emesis/NG output 550 350     Total Output 1320 1325     Net -1318 +5.4 +0.2                   Physical Exam    General: NAD  Skin: Warm, dry, anicteric  HEENT: Normocephalic, atraumatic  CV: RRR, no m/r/g  Pulm: CTA b/l, no inc WOB  Abd: Soft, minimally distended, minimally tender, incision clean dry and intact  MSK: Symmetric, no edema, no tenderness, no deformity  Neuro: AOx3, GCS 15       Lab Results: I have reviewed the following results:  Recent Labs     04/09/25  0533   WBC 11.35*   HGB 10.7*   HCT 34.0*      SODIUM 131*   K 3.5   CL 99   CO2 29   BUN 20   CREATININE 1.00   GLUC 397*   MG 1.8*             VTE Pharmacologic Prophylaxis: VTE covered by:  heparin (porcine), Subcutaneous, 5,000 Units at 04/09/25 0534     VTE Mechanical Prophylaxis: sequential compression device

## 2025-04-09 NOTE — PLAN OF CARE
Problem: PHYSICAL THERAPY ADULT  Goal: Performs mobility at highest level of function for planned discharge setting.  See evaluation for individualized goals.  Description: Treatment/Interventions: LE strengthening/ROM, Functional transfer training, Elevations, Therapeutic exercise, Endurance training, Patient/family training, Equipment eval/education, Bed mobility, Gait training, Compensatory technique education          See flowsheet documentation for full assessment, interventions and recommendations.  Outcome: Progressing  Note:    Problem List: Decreased strength, Decreased endurance, Impaired balance, Decreased mobility, Decreased safety awareness, Impaired vision, Pain  Assessment: pt began tx session lying supine in the bed as pt was agreeable to participate in PT interventions. Care coordination with OT Anna due to pt anticipated level of assistance for OOB mobility and pt limited activity tolerance. Progress was noted throughout [facility] tx session as pt was able to complete a supine<>sit EOB transfer with /s. pt was able to sit EOB w/o LOB while donning jogging pants and being educated on transfers with RW. Progress was noted with functional transfers as pt was /s for STS from EOB w/ RW, min ax1 from raised perry toilet w/ RW and mod I for transfers from recliner with and w/o an AD, good recall on hand placement. pt did increase his activity tolerance and ambulation distance as pt ambulated 80'x1 RW 15'x1 RW with close /s to min Ax1 due to 1 LOB. pt with SOB during ambulation as pt required several seated therapeutic rest breaks in [facility] tx session. pt Sp02 decreased to 87% w/ ambulation on room air as pt was placed on 1L NC 02. pt participate in TE activities w/ AROM, no increases in pain in order to strengthen LE's and increase pt activity tolerance. pt was able to complete 3 steps in todays tx session w/ bilateral hand rails, close /s, no LOB. Post tx session pt in [facility] recliner with call bell, chair  alarm activated and all pt needs met. pt would benefit from cotninued skilled PT interventions in order to increase his activity tolerance and ambulation distance. Cotninue to recomemnd DC w/ level 2 moderate rehab resource intensity when medically cleared        Rehab Resource Intensity Level, PT: II (Moderate Resource Intensity)    See flowsheet documentation for full assessment.

## 2025-04-10 LAB
ANION GAP SERPL CALCULATED.3IONS-SCNC: 5 MMOL/L (ref 4–13)
BASOPHILS # BLD AUTO: 0.09 THOUSANDS/ÂΜL (ref 0–0.1)
BASOPHILS NFR BLD AUTO: 1 % (ref 0–1)
BUN SERPL-MCNC: 18 MG/DL (ref 5–25)
CALCIUM SERPL-MCNC: 8 MG/DL (ref 8.4–10.2)
CHLORIDE SERPL-SCNC: 105 MMOL/L (ref 96–108)
CO2 SERPL-SCNC: 27 MMOL/L (ref 21–32)
CREAT SERPL-MCNC: 1.03 MG/DL (ref 0.6–1.3)
EOSINOPHIL # BLD AUTO: 0.62 THOUSAND/ÂΜL (ref 0–0.61)
EOSINOPHIL NFR BLD AUTO: 7 % (ref 0–6)
ERYTHROCYTE [DISTWIDTH] IN BLOOD BY AUTOMATED COUNT: 14.5 % (ref 11.6–15.1)
GFR SERPL CREATININE-BSD FRML MDRD: 70 ML/MIN/1.73SQ M
GLUCOSE SERPL-MCNC: 99 MG/DL (ref 65–140)
HCT VFR BLD AUTO: 33.2 % (ref 36.5–49.3)
HGB BLD-MCNC: 10.6 G/DL (ref 12–17)
IMM GRANULOCYTES # BLD AUTO: 0.04 THOUSAND/UL (ref 0–0.2)
IMM GRANULOCYTES NFR BLD AUTO: 0 % (ref 0–2)
LYMPHOCYTES # BLD AUTO: 1.03 THOUSANDS/ÂΜL (ref 0.6–4.47)
LYMPHOCYTES NFR BLD AUTO: 11 % (ref 14–44)
MCH RBC QN AUTO: 29.3 PG (ref 26.8–34.3)
MCHC RBC AUTO-ENTMCNC: 31.9 G/DL (ref 31.4–37.4)
MCV RBC AUTO: 92 FL (ref 82–98)
MONOCYTES # BLD AUTO: 0.95 THOUSAND/ÂΜL (ref 0.17–1.22)
MONOCYTES NFR BLD AUTO: 11 % (ref 4–12)
NEUTROPHILS # BLD AUTO: 6.34 THOUSANDS/ÂΜL (ref 1.85–7.62)
NEUTS SEG NFR BLD AUTO: 70 % (ref 43–75)
NRBC BLD AUTO-RTO: 0 /100 WBCS
PLATELET # BLD AUTO: 214 THOUSANDS/UL (ref 149–390)
PMV BLD AUTO: 9.3 FL (ref 8.9–12.7)
POTASSIUM SERPL-SCNC: 4.1 MMOL/L (ref 3.5–5.3)
RBC # BLD AUTO: 3.62 MILLION/UL (ref 3.88–5.62)
SODIUM SERPL-SCNC: 137 MMOL/L (ref 135–147)
WBC # BLD AUTO: 9.07 THOUSAND/UL (ref 4.31–10.16)

## 2025-04-10 PROCEDURE — 85025 COMPLETE CBC W/AUTO DIFF WBC: CPT | Performed by: PHYSICIAN ASSISTANT

## 2025-04-10 PROCEDURE — 80048 BASIC METABOLIC PNL TOTAL CA: CPT | Performed by: PHYSICIAN ASSISTANT

## 2025-04-10 PROCEDURE — 97116 GAIT TRAINING THERAPY: CPT

## 2025-04-10 PROCEDURE — 99024 POSTOP FOLLOW-UP VISIT: CPT | Performed by: SURGERY

## 2025-04-10 RX ADMIN — DEXTROSE, SODIUM CHLORIDE, AND POTASSIUM CHLORIDE 75 ML/HR: 5; .45; .15 INJECTION INTRAVENOUS at 00:04

## 2025-04-10 RX ADMIN — DEXTROSE, SODIUM CHLORIDE, AND POTASSIUM CHLORIDE 75 ML/HR: 5; .45; .15 INJECTION INTRAVENOUS at 13:17

## 2025-04-10 RX ADMIN — ACETAMINOPHEN 975 MG: 325 TABLET, FILM COATED ORAL at 05:25

## 2025-04-10 RX ADMIN — ACETAMINOPHEN 975 MG: 325 TABLET, FILM COATED ORAL at 14:45

## 2025-04-10 RX ADMIN — NICOTINE 1 PATCH: 21 PATCH, EXTENDED RELEASE TRANSDERMAL at 08:54

## 2025-04-10 RX ADMIN — CHLORHEXIDINE GLUCONATE 15 ML: 1.2 SOLUTION ORAL at 08:51

## 2025-04-10 RX ADMIN — HEPARIN SODIUM 5000 UNITS: 5000 INJECTION INTRAVENOUS; SUBCUTANEOUS at 21:39

## 2025-04-10 RX ADMIN — HEPARIN SODIUM 5000 UNITS: 5000 INJECTION INTRAVENOUS; SUBCUTANEOUS at 14:45

## 2025-04-10 RX ADMIN — ACETAMINOPHEN 975 MG: 325 TABLET, FILM COATED ORAL at 21:39

## 2025-04-10 RX ADMIN — HEPARIN SODIUM 5000 UNITS: 5000 INJECTION INTRAVENOUS; SUBCUTANEOUS at 05:25

## 2025-04-10 NOTE — PHYSICAL THERAPY NOTE
PT TREATMENT     04/10/25 1126   PT Last Visit   PT Visit Date 04/10/25   Note Type   Note Type Treatment   Pain Assessment   Pain Assessment Tool 0-10   Pain Score No Pain   Restrictions/Precautions   Other Precautions O2;Fall Risk;Bed Alarm;Chair Alarm;Multiple lines  (1 L O2 via NC; Juan Jose; IV)   General   Chart Reviewed Yes   Family/Caregiver Present No   Cognition   Overall Cognitive Status WFL   Arousal/Participation Cooperative   Attention Attends with cues to redirect   Orientation Level Oriented X4   Memory Within functional limits   Following Commands Follows multistep commands without difficulty   Comments At least 2 patient identifiers including name and date of birth   Subjective   Subjective Patient agreeable to PT   Bed Mobility   Supine to Sit 6  Modified independent   Additional items Assist x 1;Verbal cues;Bedrails;HOB elevated   Transfers   Sit to Stand 5  Supervision   Additional items Assist x 1;Verbal cues;Increased time required;Impulsive   Stand to Sit 5  Supervision   Additional items Assist x 1;Verbal cues;Increased time required;Impulsive   Ambulation/Elevation   Gait pattern Short stride;Step through pattern;Decreased foot clearance  (Quick gait speed; impulsive; generally unsafe)   Gait Assistance 5  Supervision   Additional items Assist x 1;Verbal cues;Tactile cues   Assistive Device Rolling walker   Distance 60 feet and 50 feet with change in direction with rest in between gait trial secondary to minimal shortness of breath; for first 10 feet of ambulation patient was holding the roller walker up in the air but able to place roller walker back on the floor with cues from PT. PT asked patient if he would like to try ambulation without the roller walker but patient stated that he feels he still needs the roller walker but did not needed initially and that is why he was holding it up in the air; SaO2 remained above 90% at rest and ambulation while on 1 L O2   Balance   Static Sitting  Good   Static Standing Fair +  (With roller walker)   Ambulatory Fair  (With roller walker)   Endurance Deficit   Endurance Deficit Yes   Endurance Deficit Description Limited overall activity endurance   Activity Tolerance   Activity Tolerance Patient limited by fatigue;Treatment limited secondary to medical complications (Comment)  (SOB; generalized weakness and deconditioning)   Medical Staff Made Aware CM   Nurse Made Aware RN   Assessment   Problem List Decreased strength;Decreased endurance;Impaired balance;Decreased mobility;Decreased coordination;Decreased safety awareness   Assessment Patient agreeable to skilled PT session today.  Patient is noted with improved bed mobility, transfers and ambulation with a roller walker needing less assistance overall and is also noted with increased gait balance and gait endurance.  Patient does present with minimal impulsiveness and a generalized decrease in overall safety with mobility and gait.  While admitted, patient will continue to benefit from skilled physical therapy services per PT plan of care.  Patient is making good progress toward PT goals.  Patient was initially recommended for Level II Moderate Resource Intensity at discharge but based on patient's current progress patient is now progressed to Level III Minimum Resource Intensity.  CM is aware of DC recommendation change.    The patient's AM-PAC Basic Mobility Inpatient Short Form Raw Score is 19. A Raw score of greater than 16 suggests the patient may benefit from discharge to home. Please also refer to the recommendation of the Physical Therapist for safe discharge planning.   Goals   Patient Goals To go home tomorrow   STG Expiration Date 04/21/25   Short Term Goal #1 pt will: Increase bilateral LE strength 1/2 grade to facilitate independent mobility, Perform bed mobility modified independent to increase level of independence, Perform all transfers modified independent to improve independence, Ambulate  300 ft. 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, Navigate 3 stair(s) w/ supervision with unilateral handrail to facilitate return to previous living environment, Increase all balance 1 grade to decrease risk for falls, Tolerate 3 hr OOB to faciliate upright tolerance, Tolerate standing 3 minutes modified independent to facilitate functional task performance, Improve Barthel Index score to 65 or greater to facilitate independence, and Complete Timed Up and Go or Comfortable Gait Speed to further assess mobility and monitor progress   Plan   Treatment/Interventions ADL retraining;Functional transfer training;LE strengthening/ROM;Elevations;Therapeutic exercise;Endurance training;Patient/family training;Equipment eval/education;Bed mobility;Gait training;Compensatory technique education;Spoke to nursing;Spoke to case management   PT Frequency 3-5x/wk   Discharge Recommendation   Rehab Resource Intensity Level, PT III (Minimum Resource Intensity)   AM-PAC Basic Mobility Inpatient   Turning in Flat Bed Without Bedrails 4   Lying on Back to Sitting on Edge of Flat Bed Without Bedrails 3   Moving Bed to Chair 3   Standing Up From Chair Using Arms 3   Walk in Room 3   Climb 3-5 Stairs With Railing 3   Basic Mobility Inpatient Raw Score 19   Basic Mobility Standardized Score 42.48   [facility] Highest Level Of Mobility   -HLM Goal 6: Walk 10 steps or more   -HLM Achieved 7: Walk 25 feet or more   Education   Education Provided Mobility training;Assistive device   Patient Explanation/teachback used;Reinforcement needed  (Reinforcement needed for safety)   End of Consult   Patient Position at End of Consult Bed/Chair alarm activated;Bedside chair;All needs within reach   Licensure   [place] License Number  Nettie Farris PT   Portions of the documentation may have been created using voice recognition  software. Occasional wrong word or sound alike substitutions may have occurred due to the inherent limitation of the voice recognition software. Read the chart carefully and recognize, using context, where substitutions have occurred.

## 2025-04-10 NOTE — ASSESSMENT & PLAN NOTE
75-year-old male presenting with small bowel obstruction, status post ex lap, SBR. Closed loop obstruction 2/2 adhesive band found intraoperatively with compromised segment of SB on 4/6, now clinically improving and tolerating diet with return of bowel function    Afebrile, vital signs stable within normal limits on room air    Plan  -Advance to surgical soft diet  - Decrease IV fluids  - Antibiotic course completed today  - Encourage ambulation/out of bed, 3 times daily  - Encourage incentive spirometer use, 10 times per hour  - PT/OT  - Follow-up case management regarding disposition planning, patient currently declining rehab and VNA services

## 2025-04-10 NOTE — ASSESSMENT & PLAN NOTE
Lab Results   Component Value Date    EGFR 70 04/10/2025    EGFR 69 04/09/2025    EGFR 72 04/09/2025    CREATININE 1.03 04/10/2025    CREATININE 1.04 04/09/2025    CREATININE 1.00 04/09/2025

## 2025-04-10 NOTE — PLAN OF CARE
Problem: Prexisting or High Potential for Compromised Skin Integrity  Goal: Skin integrity is maintained or improved  Description: INTERVENTIONS:- Identify patients at risk for skin breakdown- Assess and monitor skin integrity- Assess and monitor nutrition and hydration status- Monitor labs - Assess for incontinence - Turn and reposition patient- Assist with mobility/ambulation- Relieve pressure over bony prominences- Avoid friction and shearing- Provide appropriate hygiene as needed including keeping skin clean and dry- Evaluate need for skin moisturizer/barrier cream- Collaborate with interdisciplinary team - Patient/family teaching- Consider wound care consult   Outcome: Progressing     Problem: Potential for Falls  Goal: Patient will remain free of falls  Description: INTERVENTIONS:- Educate patient/family on patient safety including physical limitations- Instruct patient to call for assistance with activity - Consult OT/PT to assist with strengthening/mobility - Keep Call bell within reach- Keep bed low and locked with side rails adjusted as appropriate- Keep care items and personal belongings within reach- Initiate and maintain comfort rounds- Make Fall Risk Sign visible to staff- Offer Toileting every  Hours, in advance of need- Initiate/Maintain alarm- Obtain necessary fall risk management equipment: - Apply yellow socks and bracelet for high fall risk patients- Consider moving patient to room near nurses station  Outcome: Progressing     Problem: Nutrition/Hydration-ADULT  Goal: Nutrient/Hydration intake appropriate for improving, restoring or maintaining nutritional needs  Description: Monitor and assess patient's nutrition/hydration status for malnutrition. Collaborate with interdisciplinary team and initiate plan and interventions as ordered.  Monitor patient's weight and dietary intake as ordered or per policy. Utilize nutrition screening tool and intervene as necessary. Determine patient's food  preferences and provide high-protein, high-caloric foods as appropriate. INTERVENTIONS:- Monitor oral intake, urinary output, labs, and treatment plans- Assess nutrition and hydration status and recommend course of action- Evaluate amount of meals eaten- Assist patient with eating if necessary - Allow adequate time for meals- Recommend/ encourage appropriate diets, oral nutritional supplements, and vitamin/mineral supplements- Order, calculate, and assess calorie counts as needed- Recommend, monitor, and adjust tube feedings and TPN/PPN based on assessed needs- Assess need for intravenous fluids- Provide specific nutrition/hydration education as appropriate- Include patient/family/caregiver in decisions related to nutrition  Outcome: Progressing

## 2025-04-10 NOTE — PROGRESS NOTES
Progress Note - Surgery-General   Name: Nav Infante 75 y.o. male I MRN: [id number]  Unit/Bed#: S -01 I Date of Admission: 4/5/2025   Date of Service: 4/10/2025 I Hospital Day: 5     Assessment & Plan  SBO (small bowel obstruction) ([facility])  75-year-old male presenting with small bowel obstruction, status post ex lap, SBR. Closed loop obstruction 2/2 adhesive band found intraoperatively with compromised segment of SB on 4/6, now clinically improving and tolerating diet with return of bowel function    Afebrile, vital signs stable within normal limits on room air    Plan  -Advance to surgical soft diet  - Decrease IV fluids  - Antibiotic course completed today  - Encourage ambulation/out of bed, 3 times daily  - Encourage incentive spirometer use, 10 times per hour  - PT/OT  - Follow-up case management regarding disposition planning, patient currently declining rehab and VNA services  Stage 3b chronic kidney disease (CKD) ([facility])  Lab Results   Component Value Date    EGFR 70 04/10/2025    EGFR 69 04/09/2025    EGFR 72 04/09/2025    CREATININE 1.03 04/10/2025    CREATININE 1.04 04/09/2025    CREATININE 1.00 04/09/2025     SEPSIS, UNSPECIFIED ORGANISM  Now resolved      Subjective   Patient seen and examined at bedside.  Patient expressed no acute concerns.  Patient is having bowel function.  Patient tolerating clear liquid diet with toast and crackers.  No nausea vomiting fevers chills shortness of breath.  Patient denies abdominal pain.  Patient is ambulating.    Objective :  Temp:  [97.7 °F (36.5 °C)-98.1 °F (36.7 °C)] 97.7 °F (36.5 °C)  HR:  [74-85] 74  BP: (113-164)/(64-88) 150/80  Resp:  [16-18] 17  SpO2:  [94 %-97 %] 94 %  O2 Device: Nasal cannula  Nasal Cannula O2 Flow Rate (L/min):  [1 L/min] 1 L/min    I/O         04/08 0701 04/09 0700 04/09 0701  04/10 0700 04/10 0701 04/11 0700    P.O. 0 420     I.V. (mL/kg) 1330.4 (18.8) 0.2 (0)     NG/GT 0      Total Intake(mL/kg) 1330.4 (18.8) 420.2 (6)     Urine  (mL/kg/hr) 975 (0.6) 1375 (0.8)     Emesis/NG output 350      Stool  0     Total Output 1325 1375     Net +5.4 -954.8            Unmeasured Stool Occurrence  2 x             Physical Exam  Vitals and nursing note reviewed.   Constitutional:       General: He is not in acute distress.     Appearance: Normal appearance. He is normal weight. He is not ill-appearing or toxic-appearing.   HENT:      Head: Normocephalic and atraumatic.   Eyes:      General: No scleral icterus.  Cardiovascular:      Rate and Rhythm: Normal rate.   Pulmonary:      Effort: Pulmonary effort is normal.   Abdominal:      General: Abdomen is flat. There is no distension.      Palpations: Abdomen is soft.      Tenderness: There is no abdominal tenderness. There is no guarding.   Musculoskeletal:      Right lower leg: No edema.      Left lower leg: No edema.   Skin:     General: Skin is warm.   Neurological:      Mental Status: He is alert and oriented to person, place, and time.   Psychiatric:         Mood and Affect: Mood normal.         Lab Results: I have reviewed the following results:  Recent Labs     04/09/25  0803 04/10/25  0603   WBC  --  9.07   HGB  --  10.6*   HCT  --  33.2*   PLT  --  214   SODIUM 132* 137   K 3.8 4.1   CL 99 105   CO2 30 27   BUN 21 18   CREATININE 1.04 1.03   GLUC 120 99   MG 1.9  --        Imaging Results Review: No pertinent imaging studies reviewed.  Other Study Results Review: No additional pertinent studies reviewed.    VTE Pharmacologic Prophylaxis: VTE covered by:  heparin (porcine), Subcutaneous, 5,000 Units at 04/10/25 0525     VTE Mechanical Prophylaxis: sequential compression device

## 2025-04-10 NOTE — PLAN OF CARE
Problem: PHYSICAL THERAPY ADULT  Goal: Performs mobility at highest level of function for planned discharge setting.  See evaluation for individualized goals.  Description: Treatment/Interventions: LE strengthening/ROM, Functional transfer training, Elevations, Therapeutic exercise, Endurance training, Patient/family training, Equipment eval/education, Bed mobility, Gait training, Compensatory technique education          See flowsheet documentation for full assessment, interventions and recommendations.  Outcome: Progressing  Note:    Problem List: Decreased strength, Decreased endurance, Impaired balance, Decreased mobility, Decreased coordination, Decreased safety awareness  Assessment: Patient agreeable to skilled PT session today.  Patient is noted with improved bed mobility, transfers and ambulation with a roller walker needing less assistance overall and is also noted with increased gait balance and gait endurance.  Patient does present with minimal impulsiveness and a generalized decrease in overall safety with mobility and gait.  While admitted, patient will continue to benefit from skilled physical therapy services per PT plan of care.  Patient is making good progress toward PT goals.  Patient was initially recommended for Level II Moderate Resource Intensity at discharge but based on patient's current progress patient is now progressed to Level III Minimum Resource Intensity.  CM is aware of DC recommendation change.        Rehab Resource Intensity Level, PT: III (Minimum Resource Intensity)    See flowsheet documentation for full assessment.

## 2025-04-11 VITALS
OXYGEN SATURATION: 96 % | TEMPERATURE: 98.3 F | HEIGHT: 66 IN | HEART RATE: 77 BPM | BODY MASS INDEX: 24.38 KG/M2 | DIASTOLIC BLOOD PRESSURE: 79 MMHG | SYSTOLIC BLOOD PRESSURE: 155 MMHG | RESPIRATION RATE: 19 BRPM | WEIGHT: 151.7 LBS

## 2025-04-11 PROCEDURE — NC001 PR NO CHARGE

## 2025-04-11 PROCEDURE — 99024 POSTOP FOLLOW-UP VISIT: CPT | Performed by: SURGERY

## 2025-04-11 RX ORDER — ACETAMINOPHEN 500 MG
500 TABLET ORAL EVERY 6 HOURS PRN
COMMUNITY
Start: 2025-04-11

## 2025-04-11 RX ADMIN — HEPARIN SODIUM 5000 UNITS: 5000 INJECTION INTRAVENOUS; SUBCUTANEOUS at 05:07

## 2025-04-11 RX ADMIN — ACETAMINOPHEN 975 MG: 325 TABLET, FILM COATED ORAL at 05:08

## 2025-04-11 RX ADMIN — DEXTROSE, SODIUM CHLORIDE, AND POTASSIUM CHLORIDE 75 ML/HR: 5; .45; .15 INJECTION INTRAVENOUS at 02:20

## 2025-04-11 NOTE — DISCHARGE SUMMARY
"Discharge Summary - General Surgery   Nav Infante 75 y.o. male MRN: [id number]  Unit/Bed#: S -01 Encounter: [id number]    Admission Date: 4/5/2025     Discharge Date: 4/11/2025    Admitting Diagnosis: Acute abdominal pain [R10.9]  Abdominal pain [R10.9]  Lung nodule [R91.1]  Intestinal obstruction, unspecified cause, unspecified whether partial or complete (HCC) [K56.609]    Discharge Diagnosis: Small bowel obstruction    Attending and Service: Dr. Singh - General Surgery     Consulting Physician(s): Dr. Carson - Critical Care    Procedures Performed:   4/5/25 Exploratory laparotomy with small bowel resection with anastomosis - Dr. Singh     Imaging:  No results found.    HPI: As per ***    Hospital Course:   Nav Infante is a 75 y.o. male who presented 4/5/2025 with ***. The patient was taken to the operating room on ***. Intraoperative findings included: ***. The patient hospital course was ***un/complicated by ***.    Patient was discharged on HD***/POD***. On the day of discharge, the patient was voiding spontaneously, ambulating at baseline, tolerating a regular diet, and pain was well controlled. The patient was sent home with medication for *** (pain, stool softener, nausea). *** She/He understood all instructions for discharge. *** She/He was also given the names and numbers of the providers as well as instructions for follow up appointments.     Condition at Discharge: {condition:34855}     Discharge instructions/Information to patient and family:   See after visit summary for information provided to patient and family.      Provisions for Follow-Up Care:  See after visit summary for information related to follow-up care and any pertinent home health orders.      Disposition: {Discharge Disposition:73398}    Planned Readmission: {EXAM; YES/NO:03808::\"No\"}    Discharge Statement   I spent 30 minutes discharging the patient. This time was spent on the day of discharge. I had direct " contact with the patient on the day of discharge. Additional documentation is required if more than 30 minutes were spent on discharge.     Discharge Medications:  See after visit summary for reconciled discharge medications provided to patient and family.    Toshia Franco PA-C  4/11/2025     to follow-up care and any pertinent home health orders.      Disposition: Home    Planned Readmission: No    Discharge Statement   I spent 30 minutes discharging the patient. This time was spent on the day of discharge. I had direct contact with the patient on the day of discharge. Additional documentation is required if more than 30 minutes were spent on discharge.     Discharge Medications:  See after visit summary for reconciled discharge medications provided to patient and family.    Toshia Franco PA-C  4/11/2025

## 2025-04-11 NOTE — CASE MANAGEMENT
Case Management Discharge Planning Note    Patient name Nav Infante  Location S /S -01 MRN [id number]  : 1950 Date 2025       Current Admission Date: 2025  Current Admission Diagnosis:SBO (small bowel obstruction) (HCC)   Patient Active Problem List    Diagnosis Date Noted Date Diagnosed    Lung nodule 2025     Bronchitis 2025     Tobacco use 2025     SEPSIS, UNSPECIFIED ORGANISM 2025     SBO (small bowel obstruction) ([facility]) 2025     Acute abdominal pain 2025     Stage 3b chronic kidney disease (CKD) ([facility]) 2023     Intertrochanteric fracture of left femur ([facility]) 2018     Essential hypertension 2018     Gastritis 10/11/2016     Tobacco abuse 10/11/2016     Prostate cancer ([facility]) 2011       LOS (days): 6  Geometric Mean LOS (GMLOS) (days): 9.6  Days to GMLOS:4     OBJECTIVE:  Risk of Unplanned Readmission Score: 13.47         Current admission status: Inpatient   Preferred Pharmacy:   [id number] - [place] - [address]  [address] [facility] [address]  Phone: [phone] Fax: [phone]    [facility] - [facility] [address]  [address] [facility] [address]  [facility] [address]  Phone: [phone] Fax: [phone]    [id number]  [place] - [address] [place] [address]  Phone: [phone] Fax: [phone]    [id number] - [place], [place] - [address] [facility]  [address]  [place] [address]  Phone: [phone] Fax: [phone]    Primary Care Provider: Murphy Marks MD    Primary Insurance: [facility]  Secondary Insurance:     DISCHARGE DETAILS:    Discharge planning discussed with:: Patient  Freedom of Choice: Yes  Comments - Freedom of Choice: CM met with patient at bedside to offer to send referrals for either STR or HHC (second offer) however patient declined again and stated he is not interested and just wants  to go home. CM also discussed the recommended DME-BSC and shower chair and patient declined both.    Treatment Team Recommendation: Short Term Rehab  Discharge Destination Plan:: Home    IMM Given (Date):: 04/11/25 (IMM was reviewed with patient at bedside. Patient verbalized understanding and copy was placed in scan bin bin for patient's chart.)  IMM Given to:: Patient    IMM reviewed with patient, patient agrees with discharge determination.

## 2025-04-11 NOTE — ASSESSMENT & PLAN NOTE
-with PMHx prostate cancer s/p resection and radiation (2011), HTN, cholecystectomy, current smoker (2 ppd) presenting with acute onset abdominal pain.  -CT scan concerning for ischemic small bowel, although other differentials note excluded.  -now S/p ex lap, SBR. Closed loop obstruction 2/2 adhesive band found intraoperatively with compromised segment of SB, all on 4/6  -On a soft diet tolerating without issues  -IV abx to complete 4 day total course, ceftriaxone/Flagyl through 4/10, since completed  - PT/OT recommending level 2 disposition, however in discussion with case management patient declining both short-term rehab and home VNA, otherwise stable for discharge and will plan for discharge today

## 2025-04-11 NOTE — INCIDENTAL FINDINGS
The following findings require follow up:  Radiographic finding    - Findin mm right lower lobe nodule   - Follow up required: Follow-up chest CT recommended in 12 months considering diffuse emphysematous change.       Please notify the following clinician to assist with the follow up:   Dr. Murphy Marks    Incidental finding results were discussed with the Patient by Toshia Franco PA-C on 25.   They expressed understanding and all questions answered.

## 2025-04-11 NOTE — DISCHARGE INSTR - AVS FIRST PAGE
Acute Care Surgery Discharge Instructions    Please follow-up as instructed. If you do not already have a follow-up appointment, please call the office when you leave to schedule an appointment to be seen in 2-3 weeks for post-operative re-evaluation.    Activity:  - No lifting greater than 20 pounds or strenuous physical activity or exercise for 2 weeks.  - Walking and normal light activities are encouraged.  - Normal daily activities including climbing steps are okay.  - No driving until no longer using pain medications.    Return to work:    - You may return to work in 2 weeks or sooner if you are feeling well enough.    Diet:    - You may resume your normal diet.    Wound Care:  - May shower daily. No tub baths or swimming until cleared by your surgeon.  - Wash incision gently with soap and water and pat dry.  - Do not apply any creams or ointments unless instructed to do so by your surgeon.  - You may apply ice as needed (no longer than 20 minutes at a time) for the first 48 hours.  - Bruising is not unusual and will go away with a little time. You may apply a warm, moist compress that may help the bruising resolve quicker.  - You may remove the dressings the day after surgery (unless otherwise instructed). Leave any skin tapes (steri-strips) on the incision(s) in place until they fall off on their own. Any new dressings are optional.    Medications:    - You may resume all of your regular medications after discharge unless otherwise instructed. Please refer to your discharge medication list for further details.  - Please take the pain medications as directed.  - You are encouraged to use non-narcotic pain medications first and whenever possible. Reserve the use of narcotic pain medication for moderate to severe pain not controlled by non-narcotic medications.  - No driving while taking narcotic pain medications.  - You may become constipated, especially if taking pain medications. You may take any over the  counter stool softeners or laxatives as needed. Examples: Milk of Magnesia, Colace, Senna.    Additional Instructions:  - If you have any questions or concerns after discharge please call the office.  - Call office or return to ER if fever greater than 101, chills, persistent nausea/vomiting, worsening/uncontrollable pain, and/or increasing redness or purulent/foul smelling drainage from incision(s).

## 2025-04-11 NOTE — PROGRESS NOTES
Progress Note - Surgery-General   Name: Nav Infante 75 y.o. male I MRN: [id number]  Unit/Bed#: S -01 I Date of Admission: 4/5/2025   Date of Service: 4/11/2025 I Hospital Day: 6    Assessment & Plan  SBO (small bowel obstruction) ([facility])  -with PMHx prostate cancer s/p resection and radiation (2011), HTN, cholecystectomy, current smoker (2 ppd) presenting with acute onset abdominal pain.  -CT scan concerning for ischemic small bowel, although other differentials note excluded.  -now S/p ex lap, SBR. Closed loop obstruction 2/2 adhesive band found intraoperatively with compromised segment of SB, all on 4/6  -On a soft diet tolerating without issues  -IV abx to complete 4 day total course, ceftriaxone/Flagyl through 4/10, since completed  - PT/OT recommending level 2 disposition, however in discussion with case management patient declining both short-term rehab and home VNA, otherwise stable for discharge and will plan for discharge today  Stage 3b chronic kidney disease (CKD) ([facility])  Lab Results   Component Value Date    EGFR 70 04/10/2025    EGFR 69 04/09/2025    EGFR 72 04/09/2025    CREATININE 1.03 04/10/2025    CREATININE 1.04 04/09/2025    CREATININE 1.00 04/09/2025     SEPSIS, UNSPECIFIED ORGANISM  Now resolved, as above        Subjective   Patient doing well, tolerating soft diet, no nausea or emesis, having small loose bowel movements, voiding, ambulating, using incentive spirometry.    Objective :  Temp:  [98.3 °F (36.8 °C)-98.5 °F (36.9 °C)] 98.3 °F (36.8 °C)  HR:  [77-84] 77  BP: (122-155)/(72-85) 155/79  Resp:  [17-20] 19  SpO2:  [94 %-96 %] 96 %  O2 Device: Nasal cannula  Nasal Cannula O2 Flow Rate (L/min):  [1 L/min] 1 L/min    I/O         04/09 0701  04/10 0700 04/10 0701 04/11 0700 04/11 0701 04/12 0700    P.O. 900      I.V. (mL/kg) 0.2 (0) 2958.8 (41.6)     NG/GT       Total Intake(mL/kg) 900.2 (12.8) 2958.8 (41.6)     Urine (mL/kg/hr) 1375 (0.8)  100 (1.8)    Emesis/NG output       Stool 0       Total Output 1375  100    Net -474.8 +2958.8 -100           Unmeasured Urine Occurrence  3 x     Unmeasured Stool Occurrence 2 x 9 x             Physical Exam    General: NAD  Skin: Warm, dry, anicteric  HEENT: Normocephalic, atraumatic  CV: RRR, no m/r/g  Pulm: CTA b/l, no inc WOB  Abd: Soft, ND/NT, incision clean dry and intact  MSK: Symmetric, no edema, no tenderness, no deformity  Neuro: AOx3, GCS 15       Lab Results: I have reviewed the following results:  Recent Labs     04/09/25  0803 04/10/25  0603   WBC  --  9.07   HGB  --  10.6*   HCT  --  33.2*   PLT  --  214   SODIUM 132* 137   K 3.8 4.1   CL 99 105   CO2 30 27   BUN 21 18   CREATININE 1.04 1.03   GLUC 120 99   MG 1.9  --              VTE Pharmacologic Prophylaxis: VTE covered by:  heparin (porcine), Subcutaneous, 5,000 Units at 04/11/25 0507     VTE Mechanical Prophylaxis: sequential compression device

## 2025-04-11 NOTE — PLAN OF CARE
Problem: Prexisting or High Potential for Compromised Skin Integrity  Goal: Skin integrity is maintained or improved  Description: INTERVENTIONS:- Identify patients at risk for skin breakdown- Assess and monitor skin integrity- Assess and monitor nutrition and hydration status- Monitor labs - Assess for incontinence - Turn and reposition patient- Assist with mobility/ambulation- Relieve pressure over bony prominences- Avoid friction and shearing- Provide appropriate hygiene as needed including keeping skin clean and dry- Evaluate need for skin moisturizer/barrier cream- Collaborate with interdisciplinary team - Patient/family teaching- Consider wound care consult   Outcome: Progressing     Problem: Potential for Falls  Goal: Patient will remain free of falls  Description: INTERVENTIONS:- Educate patient/family on patient safety including physical limitations- Instruct patient to call for assistance with activity - Consult OT/PT to assist with strengthening/mobility - Keep Call bell within reach- Keep bed low and locked with side rails adjusted as appropriate- Keep care items and personal belongings within reach- Initiate and maintain comfort rounds- Make Fall Risk Sign visible to staff- Offer Toileting every 2 Hours, in advance of need- Initiate/Maintain bed/chair alarm- Obtain necessary fall risk management equipment- Apply yellow socks and bracelet for high fall risk patients- Consider moving patient to room near nurses station  Outcome: Progressing     Problem: Nutrition/Hydration-ADULT  Goal: Nutrient/Hydration intake appropriate for improving, restoring or maintaining nutritional needs  Description: Monitor and assess patient's nutrition/hydration status for malnutrition. Collaborate with interdisciplinary team and initiate plan and interventions as ordered.  Monitor patient's weight and dietary intake as ordered or per policy. Utilize nutrition screening tool and intervene as necessary. Determine patient's  food preferences and provide high-protein, high-caloric foods as appropriate. INTERVENTIONS:- Monitor oral intake, urinary output, labs, and treatment plans- Assess nutrition and hydration status and recommend course of action- Evaluate amount of meals eaten- Assist patient with eating if necessary - Allow adequate time for meals- Recommend/ encourage appropriate diets, oral nutritional supplements, and vitamin/mineral supplements- Order, calculate, and assess calorie counts as needed- Recommend, monitor, and adjust tube feedings and TPN/PPN based on assessed needs- Assess need for intravenous fluids- Provide specific nutrition/hydration education as appropriate- Include patient/family/caregiver in decisions related to nutrition  Outcome: Progressing

## 2025-04-14 ENCOUNTER — TRANSITIONAL CARE MANAGEMENT (OUTPATIENT)
Dept: FAMILY MEDICINE CLINIC | Facility: CLINIC | Age: 75
End: 2025-04-14

## 2025-04-14 NOTE — UTILIZATION REVIEW
NOTIFICATION OF ADMISSION DISCHARGE   This is a Notification of Discharge from [facility]. Please be advised that this patient has been discharge from our facility. Below you will find the admission and discharge date and time including the patient’s disposition.   UTILIZATION REVIEW CONTACT:  Utilization Review Assistants  Network Utilization Review Department  Phone: [phone] x carefully listen to the prompts. All voicemails are confidential.  Email: [email or web address]     ADMISSION INFORMATION  PRESENTATION DATE: 4/5/2025  7:13 PM  OBERVATION ADMISSION DATE: N/A  INPATIENT ADMISSION DATE: 4/5/25 10:18 PM   DISCHARGE DATE: 4/11/2025  6:27 PM   DISPOSITION:Home/Self Care    Network Utilization Review Department  ATTENTION: Please call with any questions or concerns to [phone] and carefully listen to the prompts so that you are directed to the right person. All voicemails are confidential.   For Discharge needs, contact Care Management DC Support Team at [phone] opt. 2  Send all requests for admission clinical reviews, approved or denied determinations and any other requests to dedicated fax number below belonging to the campus where the patient is receiving treatment. List of dedicated fax numbers for the Facilities:  FACILITY NAME UR FAX NUMBER   ADMISSION DENIALS (Administrative/Medical Necessity) [phone]   DISCHARGE SUPPORT TEAM ([facility]) [phone]   PARENT CHILD HEALTH (Maternity/NICU/Pediatrics) [phone]   [facility] [phone]   [facility] [phone]   [facility] [phone]   [facility] [phone]   Pending sale to [facility] [phone]   [facility] [phone]   [facility] [phone]   [facility] [phone]   [facility]  [facility] [phone]   [facility] [phone]   [facility] [phone]   [facility] [phone]

## 2025-04-15 NOTE — QUICK NOTE
Patient presenting with closed-loop small bowel obstruction, taken to the OR and found to have ischemic area of small bowel requiring resection.  Presented with leukocytosis and lactic acidosis, tachypnea, sepsis, present on admission, with known source of infection being ischemic small bowel.

## 2025-04-18 ENCOUNTER — OFFICE VISIT (OUTPATIENT)
Dept: FAMILY MEDICINE CLINIC | Facility: CLINIC | Age: 75
End: 2025-04-18
Payer: COMMERCIAL

## 2025-04-18 VITALS
OXYGEN SATURATION: 94 % | HEART RATE: 86 BPM | HEIGHT: 66 IN | SYSTOLIC BLOOD PRESSURE: 140 MMHG | WEIGHT: 142 LBS | BODY MASS INDEX: 22.82 KG/M2 | DIASTOLIC BLOOD PRESSURE: 82 MMHG | TEMPERATURE: 98 F

## 2025-04-18 DIAGNOSIS — C61 PROSTATE CANCER (HCC): ICD-10-CM

## 2025-04-18 DIAGNOSIS — N18.32 STAGE 3B CHRONIC KIDNEY DISEASE (CKD) (HCC): ICD-10-CM

## 2025-04-18 DIAGNOSIS — I10 ESSENTIAL HYPERTENSION: Primary | Chronic | ICD-10-CM

## 2025-04-18 DIAGNOSIS — Z72.0 TOBACCO ABUSE: Chronic | ICD-10-CM

## 2025-04-18 PROCEDURE — 99495 TRANSJ CARE MGMT MOD F2F 14D: CPT | Performed by: FAMILY MEDICINE

## 2025-04-18 NOTE — PROGRESS NOTES
Name: Nav Infante      : 1950      MRN: [id number]  Encounter Provider: Murphy Marks MD  Encounter Date: 2025   Encounter department: [facility]  :  Assessment & Plan  Essential hypertension  Patient is stable with current anti-hypertensive medicine and continue to follow a low sodium diet and take current medication. All questions about this condition were answered today.        Prostate cancer (HCC)  Patient is stable  and will continue present plan of care and reassess at next routine visit. All questions about this problem from patient were answered today.        Stage 3b chronic kidney disease (CKD) (HCC)  Lab Results   Component Value Date    EGFR 70 04/10/2025    EGFR 69 2025    EGFR 72 2025    CREATININE 1.03 04/10/2025    CREATININE 1.04 2025    CREATININE 1.00 2025   Pt to avoid NSAIDs and any IV dyes. Patient to follow up eoither with nephrology or  with us for  further  monitoring of  renal function.        Tobacco abuse  Patient encouraged to quit using tobacco for that increases their risk for COPD, lung cancer,stroke, oral cancer and heart disease. If patient does not want to quit they should let me know  when they are interested in quitting. There are numerous options to use to quit and we can discuss them.              Depression Screening and Follow-up Plan:   Clincally patient does not have depression. No treatment is required.     Falls Plan of Care: balance, strength, and gait training instructions were provided. Home safety education provided.     History of Present Illness   75-year-old male here today for TCM visit from recent hospital stay with obstruction of his small bowel requiring 30 cm resection.  Patient is back and is doing actually really well since his surgery.  Patient with some loose stool and I talked to him about increasing the fiber in his diet as he really has not been eating a whole lot since he came  "out of the hospital which is surgery occurred almost 2 weeks ago.  Patient with no fever or chills I reviewed his medication he takes blood pressure medicine his blood pressure is doing well he does not have a whole lot of pain or discomfort and his appetite is improving.  Patient is accompanied by his daughter and she states that he is doing better other than having some loose stool.      Review of Systems   Constitutional:  Negative for activity change, appetite change, chills, fatigue, fever and unexpected weight change.   HENT:  Negative for congestion, ear pain, hearing loss, mouth sores, postnasal drip, sinus pressure, sinus pain, sneezing and sore throat.    Respiratory:  Negative for apnea, cough, shortness of breath and wheezing.    Cardiovascular:  Negative for chest pain, palpitations and leg swelling.   Gastrointestinal:  Negative for abdominal pain, constipation, diarrhea, nausea and vomiting.   Endocrine: Negative for cold intolerance and heat intolerance.   Genitourinary:  Negative for dysuria, frequency and hematuria.   Musculoskeletal:  Negative for arthralgias, back pain, gait problem, joint swelling and neck pain.   Skin:  Negative for rash.   Neurological:  Negative for dizziness, weakness and numbness.   Hematological:  Does not bruise/bleed easily.   Psychiatric/Behavioral:  Negative for agitation, behavioral problems, confusion, hallucinations and sleep disturbance. The patient is not nervous/anxious.        Objective   /82 (BP Location: Left arm, Patient Position: Sitting, Cuff Size: Standard)   Pulse 86   Temp 98 °F (36.7 °C) (Temporal)   Ht 5' 6\" (1.676 m)   Wt 64.4 kg (142 lb)   SpO2 94%   BMI 22.92 kg/m²      Physical Exam  Constitutional:       Appearance: He is well-developed.   HENT:      Head: Normocephalic and atraumatic.      Right Ear: External ear normal.      Left Ear: External ear normal.      Nose: Nose normal.      Mouth/Throat:      Pharynx: Oropharynx is clear. " No oropharyngeal exudate.   Eyes:      General: No scleral icterus.     Conjunctiva/sclera: Conjunctivae normal.      Pupils: Pupils are equal, round, and reactive to light.   Cardiovascular:      Rate and Rhythm: Normal rate and regular rhythm.      Heart sounds: Normal heart sounds.   Pulmonary:      Effort: Pulmonary effort is normal.      Breath sounds: Normal breath sounds. No wheezing or rales.   Abdominal:      General: Bowel sounds are normal.      Palpations: Abdomen is soft.      Tenderness: There is no abdominal tenderness. There is no guarding.          Comments: Staples in place and wound CDI   Musculoskeletal:         General: Normal range of motion.      Cervical back: Normal range of motion and neck supple.   Skin:     General: Skin is warm and dry.      Findings: No erythema or rash.   Neurological:      Mental Status: He is alert and oriented to person, place, and time. Mental status is at baseline.   Psychiatric:         Mood and Affect: Mood normal.         Behavior: Behavior normal.         Thought Content: Thought content normal.         Judgment: Judgment normal.       TCM Call (since 4/4/2025)     Date and time call was made  4/14/2025 10:44 AM    Hospital care reviewed  Records reviewed    Patient was hospitialized at  [facility]    Date of Admission  04/05/25    Date of discharge  04/11/25    Diagnosis  SBO (small bowel obstruction) ([facility])    Disposition  Home    Were the patients medications reviewed and updated  No    Current Symptoms  Weakness  reduced appettite      TCM Call (since 4/4/2025)     Post hospital issues  Reduced activity    Scheduled for follow up?  Yes    Did you obtain your prescribed medications  No    Do you need help managing your prescriptions or medications  No    Is transportation to your appointment needed  No    Living Arrangements  Spouse or Significiant other    Support System  Spouse    The type of support provided  Emotional; Physical; Financial    Do  you have social support  Yes, as much as I need    Are you recieving home care services  No

## 2025-04-18 NOTE — ASSESSMENT & PLAN NOTE
Lab Results   Component Value Date    EGFR 70 04/10/2025    EGFR 69 04/09/2025    EGFR 72 04/09/2025    CREATININE 1.03 04/10/2025    CREATININE 1.04 04/09/2025    CREATININE 1.00 04/09/2025   Pt to avoid NSAIDs and any IV dyes. Patient to follow up eoither with nephrology or  with us for  further  monitoring of  renal function.

## 2025-05-07 PROBLEM — A41.9 SEPSIS DUE TO UNDETERMINED ORGANISM (HCC): Status: RESOLVED | Noted: 2025-04-07 | Resolved: 2025-05-07

## 2025-05-15 ENCOUNTER — OFFICE VISIT (OUTPATIENT)
Dept: SURGERY | Facility: CLINIC | Age: 75
End: 2025-05-15

## 2025-05-15 VITALS
SYSTOLIC BLOOD PRESSURE: 145 MMHG | DIASTOLIC BLOOD PRESSURE: 91 MMHG | BODY MASS INDEX: 21.79 KG/M2 | OXYGEN SATURATION: 98 % | HEART RATE: 83 BPM | TEMPERATURE: 98.2 F | WEIGHT: 135 LBS

## 2025-05-15 DIAGNOSIS — K56.609 SBO (SMALL BOWEL OBSTRUCTION) (HCC): Primary | ICD-10-CM

## 2025-05-15 PROCEDURE — 99024 POSTOP FOLLOW-UP VISIT: CPT | Performed by: STUDENT IN AN ORGANIZED HEALTH CARE EDUCATION/TRAINING PROGRAM

## 2025-05-15 NOTE — ASSESSMENT & PLAN NOTE
-s/p ex lap for closed loop SBO healing well  -Recommended fiber if needed for loose stools, which is improving  -Would continue no heavy lifting, pushing, or pulling >20lbs for the next month to minimize hernia risk  -Pathology reviewed, negative for malignancy  -Staples removed, steri-strips applied   -May follow up in clinic as needed. Encouraged to call with questions or concerns.

## 2025-05-15 NOTE — PROGRESS NOTES
Office Visit - General Surgery  Nav Infante MRN: [id number]  Encounter: [id number]  Assessment & Plan    Problem List Items Addressed This Visit       SBO (small bowel obstruction) (HCC) - Primary    -s/p ex lap for closed loop SBO healing well  -Recommended fiber if needed for loose stools, which is improving  -Would continue no heavy lifting, pushing, or pulling >20lbs for the next month to minimize hernia risk  -Pathology reviewed, negative for malignancy  -Staples removed, steri-strips applied   -May follow up in clinic as needed. Encouraged to call with questions or concerns.             Subjective    Nav Infante is a 75 y.o. male who presents for a post op check s/p ex lap, SBR 4/5/2025. Overall he states he is doing well. He is tolerating a diet with his appetite returning. He does report having loose stools that are slowly improving since surgery. [place].     Pt  has a past medical history of Cancer (HCC), Essential hypertension (2/4/2018), Femur fracture, right (HCC), Gastritis (10/11/2016), and Tobacco abuse (10/11/2016).    Pt  has a past surgical history that includes Cholecystectomy; Prostatectomy (2011); Fracture surgery; pr optx fem shft fx w/insj imed implt w/wo screw (Left, 2/5/2018); LAPAROTOMY (N/A, 4/5/2025); and Exploratory laparotomy w/ bowel resection (N/A, 4/5/2025).    family history includes Hypertension in his father; Stroke in his father and mother.    Nav Infante reports that he has been smoking pipe. He has never used smokeless tobacco. He reports that he does not drink alcohol and does not use drugs.      Medications Ordered Prior to Encounter[1]  Allergies[2]    Review of Systems   Constitutional: Negative.    Respiratory: Negative.     Cardiovascular: Negative.    Gastrointestinal: Negative.         +loose stools   Musculoskeletal: Negative.    Skin: Negative.    Neurological: Negative.        Objective    Vitals:    05/15/25  1402   BP: 145/91   Pulse: 83   Temp: 98.2 °F (36.8 °C)   SpO2: 98%       Physical Exam  Constitutional:       Appearance: Normal appearance.     Cardiovascular:      Rate and Rhythm: Normal rate and regular rhythm.   Pulmonary:      Effort: Pulmonary effort is normal.   Abdominal:      Comments: Soft, appropriate incisional tenderness, well healed lower midline incision secured with staples, no erythema.      Musculoskeletal:         General: Normal range of motion.     Skin:     General: Skin is warm and dry.     Neurological:      General: No focal deficit present.      Mental Status: He is alert and oriented to person, place, and time.     Psychiatric:         Mood and Affect: Mood normal.         Behavior: Behavior normal.         Attestation                   [1]   Current Outpatient Medications on File Prior to Visit   Medication Sig Dispense Refill    acetaminophen (TYLENOL) 500 mg tablet Take 1 tablet (500 mg total) by mouth every 6 (six) hours as needed for mild pain      ammonium lactate (LAC-HYDRIN) 12 % cream Apply topically as needed for dry skin 385 g 1    cholecalciferol (VITAMIN D3) 1,000 units tablet Take 1,000 Units by mouth daily      lisinopril (ZESTRIL) 40 mg tablet Take 1 tablet (40 mg total) by mouth daily 90 tablet 1     No current facility-administered medications on file prior to visit.   [2] No Known Allergies

## 2025-08-18 DIAGNOSIS — I10 ESSENTIAL HYPERTENSION: Chronic | ICD-10-CM

## 2025-08-20 RX ORDER — LISINOPRIL 40 MG/1
40 TABLET ORAL DAILY
Qty: 90 TABLET | Refills: 1 | Status: SHIPPED | OUTPATIENT
Start: 2025-08-20

## (undated) DEVICE — ACE WRAP 6 IN UNSTERILE

## (undated) DEVICE — SUT VICRYL PLUS 2-0 54IN VCP286G

## (undated) DEVICE — STAPLER WITH DST SERIES TECHNOLOGY: Brand: GIA

## (undated) DEVICE — WOUND RETRACTOR AND PROTECTOR: Brand: ALEXIS O WOUND PROTECTOR-RETRACTOR

## (undated) DEVICE — TOWEL SURG XR DETECT GREEN STRL RFD

## (undated) DEVICE — INTENDED FOR TISSUE SEPARATION, AND OTHER PROCEDURES THAT REQUIRE A SHARP SURGICAL BLADE TO PUNCTURE OR CUT.: Brand: BARD-PARKER SAFETY BLADES SIZE 10, STERILE

## (undated) DEVICE — POOLE SUCTION HANDLE: Brand: CARDINAL HEALTH

## (undated) DEVICE — SUT SILK 2-0 TIES 144 IN LA55G

## (undated) DEVICE — INTENDED FOR TISSUE SEPARATION, AND OTHER PROCEDURES THAT REQUIRE A SHARP SURGICAL BLADE TO PUNCTURE OR CUT.: Brand: BARD-PARKER SAFETY BLADES SIZE 15, STERILE

## (undated) DEVICE — SUT SILK 3-0 SH 30 IN K832H

## (undated) DEVICE — GLOVE SRG BIOGEL 7.5

## (undated) DEVICE — 3.2MM GUIDE WIRE 400MM

## (undated) DEVICE — GLOVE INDICATOR PI UNDERGLOVE SZ 7.5 BLUE

## (undated) DEVICE — CURVED, LARGE JAW, OPEN SEALER/DIVIDER NANO-COATED: Brand: LIGASURE IMPACT

## (undated) DEVICE — LAPAROTOMY SPONGE - RF AND X-RAY DETECTABLE PRE-WASHED: Brand: SITUATE

## (undated) DEVICE — SUT VICRYL 2-0 CT-1 27 IN J259H

## (undated) DEVICE — DRESSING MEPILEX AG BORDER 4 X 8 IN

## (undated) DEVICE — NEPTUNE E-SEP SMOKE EVACUATION PENCIL, COATED, 70MM BLADE, PUSH BUTTON SWITCH: Brand: NEPTUNE E-SEP

## (undated) DEVICE — ARTHROSCOPY FLOOR MAT

## (undated) DEVICE — 3M™ IOBAN™ 2 ANTIMICROBIAL INCISE DRAPE 6650EZ: Brand: IOBAN™ 2

## (undated) DEVICE — SCD SEQUENTIAL COMPRESSION COMFORT SLEEVE MEDIUM KNEE LENGTH: Brand: KENDALL SCD

## (undated) DEVICE — SUT VICRYL 0 CT-1 27 IN J260H

## (undated) DEVICE — CHLORAPREP HI-LITE 26ML ORANGE

## (undated) DEVICE — LIGHT HANDLE COVER SLEEVE DISP BLUE STELLAR

## (undated) DEVICE — TRAY FOLEY 16FR URIMETER SURESTEP

## (undated) DEVICE — STERILE ORIF HIP PACK: Brand: CARDINAL HEALTH

## (undated) DEVICE — MEDI-VAC YANK SUCT HNDL W/TPRD BULBOUS TIP: Brand: CARDINAL HEALTH

## (undated) DEVICE — DRESSING MEPILEX AG BORDER 4 X 4 IN

## (undated) DEVICE — GLOVE INDICATOR PI UNDERGLOVE SZ 8.5 BLUE

## (undated) DEVICE — SOFT SILICONE HYDROCELLULAR FOAM DRESSING WITH LOCK AWAY LAYER: Brand: ALLEVYN LIFE M 12.9X12.9 CTN10

## (undated) DEVICE — SLIM BODY SKIN STAPLER: Brand: APPOSE ULC

## (undated) DEVICE — DRESSING MEPILEX BORDER 4 X 8 IN

## (undated) DEVICE — STAPLER WITH DST SERIES TECHNOLOGY: Brand: TA

## (undated) DEVICE — 6617 IOBAN II PATIENT ISOLATION DRAPE 5/BX,4BX/CS: Brand: STERI-DRAPE™ IOBAN™ 2

## (undated) DEVICE — LOADING UNIT WITH DST SERIES TECHNOLOGY: Brand: GIA

## (undated) DEVICE — ANTIBACTERIAL UNDYED BRAIDED (POLYGLACTIN 910), SYNTHETIC ABSORBABLE SUTURE: Brand: COATED VICRYL

## (undated) DEVICE — GLOVE SRG BIOGEL 8.5

## (undated) DEVICE — BETHLEHEM MAJOR GENERAL PACK: Brand: CARDINAL HEALTH

## (undated) DEVICE — 4.0MM THREE-FLUTED DRILL BIT QC/260MM/65MM CALIBRATION